# Patient Record
Sex: FEMALE | Race: WHITE | NOT HISPANIC OR LATINO | Employment: UNEMPLOYED | URBAN - METROPOLITAN AREA
[De-identification: names, ages, dates, MRNs, and addresses within clinical notes are randomized per-mention and may not be internally consistent; named-entity substitution may affect disease eponyms.]

---

## 2017-09-27 ENCOUNTER — ALLSCRIPTS OFFICE VISIT (OUTPATIENT)
Dept: OTHER | Facility: OTHER | Age: 62
End: 2017-09-27

## 2017-09-27 ENCOUNTER — GENERIC CONVERSION - ENCOUNTER (OUTPATIENT)
Dept: OTHER | Facility: OTHER | Age: 62
End: 2017-09-27

## 2017-09-27 ENCOUNTER — GENERIC CONVERSION - ENCOUNTER (OUTPATIENT)
Dept: INTERNAL MEDICINE CLINIC | Facility: CLINIC | Age: 62
End: 2017-09-27

## 2017-09-27 DIAGNOSIS — R92.2 INCONCLUSIVE MAMMOGRAM: ICD-10-CM

## 2017-09-27 DIAGNOSIS — Z12.11 ENCOUNTER FOR SCREENING FOR MALIGNANT NEOPLASM OF COLON: ICD-10-CM

## 2017-09-27 DIAGNOSIS — R53.83 OTHER FATIGUE: ICD-10-CM

## 2017-09-27 DIAGNOSIS — E55.9 VITAMIN D DEFICIENCY: ICD-10-CM

## 2017-09-27 DIAGNOSIS — Z13.6 ENCOUNTER FOR SCREENING FOR CARDIOVASCULAR DISORDERS: ICD-10-CM

## 2017-09-27 DIAGNOSIS — Z11.59 ENCOUNTER FOR SCREENING FOR OTHER VIRAL DISEASES: ICD-10-CM

## 2018-01-22 VITALS
HEART RATE: 76 BPM | TEMPERATURE: 98.6 F | WEIGHT: 148 LBS | BODY MASS INDEX: 27.23 KG/M2 | HEIGHT: 62 IN | OXYGEN SATURATION: 98 %

## 2018-01-22 VITALS — DIASTOLIC BLOOD PRESSURE: 85 MMHG | SYSTOLIC BLOOD PRESSURE: 160 MMHG

## 2018-01-22 VITALS — DIASTOLIC BLOOD PRESSURE: 70 MMHG | SYSTOLIC BLOOD PRESSURE: 110 MMHG

## 2018-06-04 ENCOUNTER — OFFICE VISIT (OUTPATIENT)
Dept: OBGYN CLINIC | Age: 63
End: 2018-06-04
Payer: COMMERCIAL

## 2018-06-04 VITALS
SYSTOLIC BLOOD PRESSURE: 148 MMHG | WEIGHT: 143 LBS | DIASTOLIC BLOOD PRESSURE: 86 MMHG | BODY MASS INDEX: 25.34 KG/M2 | HEIGHT: 63 IN

## 2018-06-04 DIAGNOSIS — Z12.31 ENCOUNTER FOR SCREENING MAMMOGRAM FOR MALIGNANT NEOPLASM OF BREAST: ICD-10-CM

## 2018-06-04 DIAGNOSIS — Z01.419 ENCOUNTER FOR GYNECOLOGICAL EXAMINATION WITHOUT ABNORMAL FINDING: Primary | ICD-10-CM

## 2018-06-04 DIAGNOSIS — R92.2 DENSE BREAST: ICD-10-CM

## 2018-06-04 DIAGNOSIS — Z12.11 SCREEN FOR COLON CANCER: ICD-10-CM

## 2018-06-04 PROBLEM — E55.9 MILD VITAMIN D DEFICIENCY: Status: ACTIVE | Noted: 2017-09-27

## 2018-06-04 PROBLEM — R92.30 DENSE BREASTS: Status: ACTIVE | Noted: 2017-09-28

## 2018-06-04 PROCEDURE — 87624 HPV HI-RISK TYP POOLED RSLT: CPT | Performed by: OBSTETRICS & GYNECOLOGY

## 2018-06-04 PROCEDURE — G0145 SCR C/V CYTO,THINLAYER,RESCR: HCPCS | Performed by: OBSTETRICS & GYNECOLOGY

## 2018-06-04 PROCEDURE — 99396 PREV VISIT EST AGE 40-64: CPT | Performed by: OBSTETRICS & GYNECOLOGY

## 2018-06-04 NOTE — PROGRESS NOTES
Assessment/Plan:    Encounter for gynecological examination without abnormal finding  Pap/HPV collected  Mammogram ordered   Colonoscopy ordered/recommended  Encourage healthy diet, exercise, Calcium 1200mg per day and at least 800 iu Vitamin D daily  Hypertension- recommend follow up with PCP  Healthy diet, low salt, increase exercise  Diagnoses and all orders for this visit:    Encounter for gynecological examination without abnormal finding    Encounter for screening mammogram for malignant neoplasm of breast  -     Mammo screening bilateral w 3d & cad; Future    Dense breast  -     Mammo screening bilateral w 3d & cad; Future    Screen for colon cancer  -     Ambulatory referral to Gastroenterology; Future    Other orders  -     Cancel: Mammo screening bilateral w cad; Future          Subjective:      Patient ID: Zakiya Vail is a 65 y o  female  Patient here for new patient yearly exam reff by primary care doctor  Age of first period 17yrs old   (1 MISCARRIAGE)  LMP: Patient is   Last pap: 10/22/14 neg hpv neg (due )  Last mammo: 10/22/14 dense breast neg   Colonoscopy:  (due)  Patient is not a smoker  Patient is a drinker  2 glasses of wine weekly  Patient doesn't exercise  No complaints  Here for yearly visit  Works in [employer] in [employer]  4 adult children  One daughter lives in [place] with three children  Other daughter just got   Gynecologic Exam   The patient's pertinent negatives include no genital itching, genital lesions, genital odor, genital rash, pelvic pain, vaginal bleeding or vaginal discharge  The patient is experiencing no pain  Pertinent negatives include no chills, constipation, diarrhea, fever, nausea, sore throat or vomiting  She is not sexually active  She is postmenopausal  Her past medical history is significant for a  section         The following portions of the patient's history were reviewed and updated as appropriate:   She  has a past medical history of Arthritis and Vocal cord nodules  She   Patient Active Problem List    Diagnosis Date Noted    Encounter for gynecological examination without abnormal finding 2018    Dense breasts 2017    Mild vitamin D deficiency 2017     She  has a past surgical history that includes  section and Laryngoscopy  Her family history includes Breast cancer in her mother; Cancer in her father; Diabetes in her daughter and son; Diabetes type I in her child, father, and mother; Lung cancer in her father and mother; Mental illness in her son  She  reports that she has never smoked  She has never used smokeless tobacco  She reports that she drinks about 1 2 oz of alcohol per week   She reports that she does not use drugs  No current outpatient prescriptions on file  No current facility-administered medications for this visit  No current outpatient prescriptions on file prior to visit  No current facility-administered medications on file prior to visit  She has No Known Allergies       Review of Systems   Constitutional: Negative for activity change, appetite change, chills, fatigue and fever  HENT: Negative for rhinorrhea, sneezing and sore throat  Eyes: Negative for visual disturbance  Respiratory: Negative for cough, shortness of breath and wheezing  Cardiovascular: Negative for chest pain, palpitations and leg swelling  Gastrointestinal: Negative for abdominal distention, constipation, diarrhea, nausea and vomiting  Genitourinary: Negative for difficulty urinating, pelvic pain and vaginal discharge  Neurological: Negative for syncope and light-headedness           Objective:      /86 (BP Location: Left arm, Patient Position: Sitting, Cuff Size: Standard)   Ht 5' 3" (1 6 m)   Wt 64 9 kg (143 lb)   LMP  (LMP Unknown)   BMI 25 33 kg/m²          Physical Exam   Constitutional: She is oriented to person, place, and time    Genitourinary: Vagina normal and uterus normal  No breast swelling, tenderness, discharge or bleeding  There is no rash, tenderness, lesion or injury on the right labia  There is no rash, tenderness, lesion or injury on the left labia  Uterus is not deviated, not enlarged, not fixed and not tender  Cervix exhibits no motion tenderness, no discharge and no friability  Right adnexum displays no mass, no tenderness and no fullness  Left adnexum displays no mass, no tenderness and no fullness  No tenderness or bleeding in the vagina  No vaginal discharge found  Neurological: She is alert and oriented to person, place, and time

## 2018-06-04 NOTE — ASSESSMENT & PLAN NOTE
Pap/HPV collected  Mammogram ordered   Colonoscopy ordered/recommended  Encourage healthy diet, exercise, Calcium 1200mg per day and at least 800 iu Vitamin D daily  Hypertension- recommend follow up with PCP  Healthy diet, low salt, increase exercise

## 2018-06-04 NOTE — PATIENT INSTRUCTIONS

## 2018-06-05 LAB — HPV RRNA GENITAL QL NAA+PROBE: NORMAL

## 2018-06-06 LAB
LAB AP GYN PRIMARY INTERPRETATION: NORMAL
Lab: NORMAL

## 2018-06-07 ENCOUNTER — TRANSCRIBE ORDERS (OUTPATIENT)
Dept: ADMINISTRATIVE | Facility: HOSPITAL | Age: 63
End: 2018-06-07

## 2018-06-07 DIAGNOSIS — Z13.820 SCREENING FOR OSTEOPOROSIS: Primary | ICD-10-CM

## 2018-06-08 ENCOUNTER — TELEPHONE (OUTPATIENT)
Dept: OBGYN CLINIC | Age: 63
End: 2018-06-08

## 2018-06-08 NOTE — TELEPHONE ENCOUNTER
----- Message from Conrad Avila MD sent at 6/6/2018  3:21 PM EDT -----  Please call patient  Pap and HPV are negative

## 2018-06-08 NOTE — TELEPHONE ENCOUNTER
Called patient to advise of normal pap results  Unable to reach patient left detailed voicemail advising patient of normal results, advised to call with any other questions or concerns  Number to the [facility] office provided as well

## 2018-07-16 ENCOUNTER — HOSPITAL ENCOUNTER (OUTPATIENT)
Dept: MAMMOGRAPHY | Facility: CLINIC | Age: 63
Discharge: HOME | End: 2018-07-16
Payer: COMMERCIAL

## 2018-07-16 ENCOUNTER — HOSPITAL ENCOUNTER (OUTPATIENT)
Dept: BONE DENSITY | Facility: CLINIC | Age: 63
Discharge: HOME | End: 2018-07-16
Payer: COMMERCIAL

## 2018-07-16 ENCOUNTER — APPOINTMENT (OUTPATIENT)
Dept: LAB | Facility: CLINIC | Age: 63
End: 2018-07-16
Payer: COMMERCIAL

## 2018-07-16 DIAGNOSIS — Z11.59 ENCOUNTER FOR SCREENING FOR OTHER VIRAL DISEASES: ICD-10-CM

## 2018-07-16 DIAGNOSIS — Z12.31 ENCOUNTER FOR SCREENING MAMMOGRAM FOR MALIGNANT NEOPLASM OF BREAST: ICD-10-CM

## 2018-07-16 DIAGNOSIS — E55.9 VITAMIN D DEFICIENCY: ICD-10-CM

## 2018-07-16 DIAGNOSIS — R53.83 OTHER FATIGUE: ICD-10-CM

## 2018-07-16 DIAGNOSIS — R92.2 DENSE BREAST: ICD-10-CM

## 2018-07-16 DIAGNOSIS — Z12.11 ENCOUNTER FOR SCREENING FOR MALIGNANT NEOPLASM OF COLON: ICD-10-CM

## 2018-07-16 DIAGNOSIS — Z13.6 ENCOUNTER FOR SCREENING FOR CARDIOVASCULAR DISORDERS: ICD-10-CM

## 2018-07-16 LAB
25(OH)D3 SERPL-MCNC: 31.6 NG/ML (ref 30–100)
ALBUMIN SERPL BCP-MCNC: 4 G/DL (ref 3.5–5)
ALP SERPL-CCNC: 62 U/L (ref 46–116)
ALT SERPL W P-5'-P-CCNC: 25 U/L (ref 12–78)
ANION GAP SERPL CALCULATED.3IONS-SCNC: 6 MMOL/L (ref 4–13)
AST SERPL W P-5'-P-CCNC: 16 U/L (ref 5–45)
BASOPHILS # BLD AUTO: 0.04 THOUSANDS/ΜL (ref 0–0.1)
BASOPHILS NFR BLD AUTO: 1 % (ref 0–1)
BILIRUB SERPL-MCNC: 0.41 MG/DL (ref 0.2–1)
BUN SERPL-MCNC: 12 MG/DL (ref 5–25)
CALCIUM SERPL-MCNC: 9 MG/DL (ref 8.3–10.1)
CHLORIDE SERPL-SCNC: 104 MMOL/L (ref 100–108)
CHOLEST SERPL-MCNC: 216 MG/DL (ref 50–200)
CO2 SERPL-SCNC: 28 MMOL/L (ref 21–32)
CREAT SERPL-MCNC: 0.72 MG/DL (ref 0.6–1.3)
EOSINOPHIL # BLD AUTO: 0.1 THOUSAND/ΜL (ref 0–0.61)
EOSINOPHIL NFR BLD AUTO: 2 % (ref 0–6)
ERYTHROCYTE [DISTWIDTH] IN BLOOD BY AUTOMATED COUNT: 13.2 % (ref 11.6–15.1)
GFR SERPL CREATININE-BSD FRML MDRD: 91 ML/MIN/1.73SQ M
GLUCOSE P FAST SERPL-MCNC: 85 MG/DL (ref 65–99)
HCT VFR BLD AUTO: 38.8 % (ref 34.8–46.1)
HDLC SERPL-MCNC: 66 MG/DL (ref 40–60)
HGB BLD-MCNC: 12.7 G/DL (ref 11.5–15.4)
IMM GRANULOCYTES # BLD AUTO: 0.02 THOUSAND/UL (ref 0–0.2)
IMM GRANULOCYTES NFR BLD AUTO: 0 % (ref 0–2)
LDLC SERPL CALC-MCNC: 122 MG/DL (ref 0–100)
LYMPHOCYTES # BLD AUTO: 2.65 THOUSANDS/ΜL (ref 0.6–4.47)
LYMPHOCYTES NFR BLD AUTO: 42 % (ref 14–44)
MCH RBC QN AUTO: 30.1 PG (ref 26.8–34.3)
MCHC RBC AUTO-ENTMCNC: 32.7 G/DL (ref 31.4–37.4)
MCV RBC AUTO: 92 FL (ref 82–98)
MONOCYTES # BLD AUTO: 0.37 THOUSAND/ΜL (ref 0.17–1.22)
MONOCYTES NFR BLD AUTO: 6 % (ref 4–12)
NEUTROPHILS # BLD AUTO: 3.11 THOUSANDS/ΜL (ref 1.85–7.62)
NEUTS SEG NFR BLD AUTO: 49 % (ref 43–75)
NRBC BLD AUTO-RTO: 0 /100 WBCS
PLATELET # BLD AUTO: 326 THOUSANDS/UL (ref 149–390)
PMV BLD AUTO: 10.4 FL (ref 8.9–12.7)
POTASSIUM SERPL-SCNC: 3.6 MMOL/L (ref 3.5–5.3)
PROT SERPL-MCNC: 7.8 G/DL (ref 6.4–8.2)
RBC # BLD AUTO: 4.22 MILLION/UL (ref 3.81–5.12)
SODIUM SERPL-SCNC: 138 MMOL/L (ref 136–145)
TRIGL SERPL-MCNC: 138 MG/DL
TSH SERPL DL<=0.05 MIU/L-ACNC: 2.67 UIU/ML (ref 0.36–3.74)
WBC # BLD AUTO: 6.29 THOUSAND/UL (ref 4.31–10.16)

## 2018-07-16 PROCEDURE — 86803 HEPATITIS C AB TEST: CPT

## 2018-07-16 PROCEDURE — 77067 SCR MAMMO BI INCL CAD: CPT

## 2018-07-16 PROCEDURE — 36415 COLL VENOUS BLD VENIPUNCTURE: CPT

## 2018-07-16 PROCEDURE — 82306 VITAMIN D 25 HYDROXY: CPT

## 2018-07-16 PROCEDURE — 80061 LIPID PANEL: CPT

## 2018-07-16 PROCEDURE — 77080 DXA BONE DENSITY AXIAL: CPT

## 2018-07-16 PROCEDURE — 84443 ASSAY THYROID STIM HORMONE: CPT

## 2018-07-16 PROCEDURE — 80053 COMPREHEN METABOLIC PANEL: CPT

## 2018-07-16 PROCEDURE — 77063 BREAST TOMOSYNTHESIS BI: CPT

## 2018-07-16 PROCEDURE — 85025 COMPLETE CBC W/AUTO DIFF WBC: CPT

## 2018-07-18 LAB — HCV AB SER QL: NORMAL

## 2018-07-19 DIAGNOSIS — M85.859 OSTEOPENIA OF NECK OF FEMUR, UNSPECIFIED LATERALITY: Primary | ICD-10-CM

## 2018-07-20 ENCOUNTER — TELEPHONE (OUTPATIENT)
Dept: INTERNAL MEDICINE CLINIC | Facility: CLINIC | Age: 63
End: 2018-07-20

## 2018-07-24 ENCOUNTER — TELEPHONE (OUTPATIENT)
Dept: OBGYN CLINIC | Facility: CLINIC | Age: 63
End: 2018-07-24

## 2018-07-24 NOTE — TELEPHONE ENCOUNTER
----- Message from Dahiana Nichols sent at 7/24/2018 10:59 AM EDT -----  Regarding: Test Results Question  Contact: [phone]  What are the results of my mammogram?

## 2018-08-03 ENCOUNTER — OFFICE VISIT (OUTPATIENT)
Dept: INTERNAL MEDICINE CLINIC | Facility: CLINIC | Age: 63
End: 2018-08-03
Payer: COMMERCIAL

## 2018-08-03 VITALS
TEMPERATURE: 98.4 F | HEIGHT: 63 IN | SYSTOLIC BLOOD PRESSURE: 126 MMHG | DIASTOLIC BLOOD PRESSURE: 80 MMHG | RESPIRATION RATE: 18 BRPM | HEART RATE: 78 BPM | BODY MASS INDEX: 25.16 KG/M2 | WEIGHT: 142 LBS | OXYGEN SATURATION: 99 %

## 2018-08-03 DIAGNOSIS — T14.8XXA ABRASION: ICD-10-CM

## 2018-08-03 DIAGNOSIS — R35.0 URINE FREQUENCY: Primary | ICD-10-CM

## 2018-08-03 DIAGNOSIS — R30.0 DYSURIA: ICD-10-CM

## 2018-08-03 LAB
SL AMB  POCT GLUCOSE, UA: 0
SL AMB LEUKOCYTE ESTERASE,UA: ABNORMAL
SL AMB POCT BILIRUBIN,UA: 0
SL AMB POCT BLOOD,UA: ABNORMAL
SL AMB POCT CLARITY,UA: ABNORMAL
SL AMB POCT COLOR,UA: ABNORMAL
SL AMB POCT KETONES,UA: 0
SL AMB POCT NITRITE,UA: 0
SL AMB POCT PH,UA: 5
SL AMB POCT SPECIFIC GRAVITY,UA: 1
SL AMB POCT URINE PROTEIN: 0
SL AMB POCT UROBILINOGEN: 0.2

## 2018-08-03 PROCEDURE — 87077 CULTURE AEROBIC IDENTIFY: CPT | Performed by: NURSE PRACTITIONER

## 2018-08-03 PROCEDURE — 90715 TDAP VACCINE 7 YRS/> IM: CPT

## 2018-08-03 PROCEDURE — 81001 URINALYSIS AUTO W/SCOPE: CPT | Performed by: NURSE PRACTITIONER

## 2018-08-03 PROCEDURE — 81002 URINALYSIS NONAUTO W/O SCOPE: CPT | Performed by: NURSE PRACTITIONER

## 2018-08-03 PROCEDURE — 87086 URINE CULTURE/COLONY COUNT: CPT | Performed by: NURSE PRACTITIONER

## 2018-08-03 PROCEDURE — 87186 SC STD MICRODIL/AGAR DIL: CPT | Performed by: NURSE PRACTITIONER

## 2018-08-03 PROCEDURE — 90471 IMMUNIZATION ADMIN: CPT

## 2018-08-03 PROCEDURE — 3008F BODY MASS INDEX DOCD: CPT | Performed by: NURSE PRACTITIONER

## 2018-08-03 PROCEDURE — 99214 OFFICE O/P EST MOD 30 MIN: CPT | Performed by: NURSE PRACTITIONER

## 2018-08-03 RX ORDER — NITROFURANTOIN 25; 75 MG/1; MG/1
100 CAPSULE ORAL 2 TIMES DAILY
Qty: 14 CAPSULE | Refills: 0 | Status: SHIPPED | OUTPATIENT
Start: 2018-08-03 | End: 2020-04-16

## 2018-08-03 NOTE — PROGRESS NOTES
Assessment/Plan:    1  UTI-will start Macrobid and send urine for culture  2  Small cut on right eyebrow area-will give Adacel today    Follow up as needed  Diagnoses and all orders for this visit:    Urine frequency  -     POCT urine dip  -     Urinalysis with microscopic  -     Urine culture; Future  -     Urine culture  -     nitrofurantoin (MACROBID) 100 mg capsule; Take 1 capsule (100 mg total) by mouth 2 (two) times a day    Dysuria  -     Urinalysis with microscopic  -     Urine culture; Future  -     Urine culture  -     nitrofurantoin (MACROBID) 100 mg capsule; Take 1 capsule (100 mg total) by mouth 2 (two) times a day    Abrasion  -     TDAP VACCINE GREATER THAN OR EQUAL TO 6YO IM        The patient was counseled regarding instructions for management, risk factor reductions, patient and family education,impressions, risks and benefits of treatment options, side effects of medications, importance of compliance with treatment  The treatment plan was reviewed with the patient/guardian and patient/guardian understands and agrees with the treatment plan  Current Outpatient Prescriptions:     nitrofurantoin (MACROBID) 100 mg capsule, Take 1 capsule (100 mg total) by mouth 2 (two) times a day, Disp: 14 capsule, Rfl: 0    Subjective:      Patient ID: Zakiya Vail is a 65 y o  female  Jean Claude Almaguer is here today with a few days of dyuria with an episode of feeling feverish  The following portions of the patient's history were reviewed and updated as appropriate:   She has a past medical history of Arthritis and Vocal cord nodules  ,   does not have any pertinent problems on file  ,   has a past surgical history that includes  section and Laryngoscopy  ,  family history includes Breast cancer in her mother; Cancer in her father; Diabetes in her daughter and son; Diabetes type I in her child, father, and mother; Lung cancer in her father and mother; Mental illness in her son , reports that she has never smoked  She has never used smokeless tobacco  She reports that she drinks about 1 2 oz of alcohol per week   She reports that she does not use drugs  ,  has No Known Allergies       Review of Systems   Constitutional: Negative  Respiratory: Negative  Cardiovascular: Negative  Genitourinary: Positive for dysuria  Musculoskeletal: Negative  Psychiatric/Behavioral: Negative  Objective:  /80   Pulse 78   Temp 98 4 °F (36 9 °C)   Resp 18   Ht 5' 3" (1 6 m)   Wt 64 4 kg (142 lb)   LMP  (LMP Unknown)   SpO2 99%   BMI 25 15 kg/m²     Lab Review  not applicable     Imaging: Dxa Bone Density Spine Hip And Pelvis    Result Date: 7/17/2018  Narrative: CENTRAL  DXA SCAN CLINICAL HISTORY:   65year old post-menopausal  female with no significant past medical history  Osteoporosis screening  TECHNIQUE: Bone densitometry was performed using a Horizon C bone densitometer  Regions of interest appear properly placed  There are no obvious fractures or other confounding variables which could limit the study  Degenerative changes of the lumbar spine and hip  This will falsely elevate the bone mineral densities in these regions  COMPARISON:  None  RESULTS: LUMBAR SPINE:  L1-L4: BMD 0 993 gm/cm2 T-score -0 5 Z-score 1 0 LEFT TOTAL HIP: BMD 0 826 gm/cm2 T-score -0 9 Z-score 0 1 LEFT FEMORAL NECK: BMD 0 601 gm/cm2 T-score -2 2 Z-score -0 9     Impression: 1  Based on the [facility] classification, the T-score of -2 2 in the left hip is consistent with low bone mineral density  2   The 10 year risk of hip fracture is 2 % with the 10 year risk of major osteoporotic fracture being 11  % as calculated by the WHO fracture risk assessment tool (FRAX)  The current NOF guidelines recommend treating patients with FRAX 10 year risk score of >3% for hip fracture and >20% for major osteoporotic fracture   3   Any secondary causes of low bone mineral density should be excluded prior to treatment, if clinically indicated  4   A daily intake of at least 1200 mg calcium and 800 - 1000 IU of Vitamin D, as well as weight bearing and muscle strengthening exercise, fall prevention and avoidance of tobacco and excessive alcohol intake as basic preventive measures are suggested  WHO CLASSIFICATION: Normal (a T-score of -1 0 or higher) Low bone mineral density (a T-score of less than -1 0 but higher than -2 5) Osteoporosis (a T-score of -2 5 or less) Severe osteoporosis (a T-score of -2 5 or less with a fragility fracture)   Workstation performed: [id number]     Mammo Screening Bilateral W 3d & Cad    Result Date: 7/23/2018  Narrative: Patient History: Patient is postmenopausal  Family history of breast cancer at age 46 in mother, unknown cancer at age [de-identified] in father  Patient has never smoked  Patient's BMI is 25 2  Reason for exam: screening, asymptomatic  Screening Mammo Screening Bilateral W DBT and CAD: July 16, 2018 - Check In #: [de-identified] 2D/3D Procedure 3D views: Bilateral MLO view(s) were taken  2D views: Bilateral CC view(s) were taken  Technologist: JULIUS Chance Prior study comparison: October 22, 2014, mammogram, performed at [facility]  October 22, 2014, ultrasound, performed at [facility]  August 16, 2013, mammogram, performed at [facility]  February 28, 2012, mammogram, performed at [facility]  December 29, 2010, mammogram, performed at [facility]  The breast tissue is heterogeneously dense, potentially limiting the sensitivity of mammography  Patient risk, included in this report, assists in determining the appropriate screening regimen (such as 3-D mammography or the inclusion of automated breast ultrasound or MRI)  3-D mammography may also remain indicated as screening  Left centrally lucent calcifications are characteristically-benign in morphology   No concerning masses, developing asymmetries, suspicious microcalcifications, architectural distortion, or abnormalities of the nipples or skin in either breast  SUMMARY: No evidence for malignancy in either breast   No significant changes when compared with prior studies  ACR BI-RADS® Assessments: BiRad:2 - Benign Recommendation: Routine screening mammogram of both breasts in 1 year  The patient is scheduled in a reminder system for screening mammography  8-10% of cancers will be missed on mammography  Management of a palpable abnormality must be based on clinical grounds  Patients will be notified of their results via letter from our facility  Accredited by [employer] of Radiology and FDA  Transcription Location: [address] [place] [address]: [id number] Risk Value(s): Tyrer-Cuzick 10 Year: 5 200%, Tyrer-Cuzick Lifetime: 12 300%, Myriad Table: 1 5%, SRIDHAR 5 Year: 2 8%, NCI Lifetime: 13 2%         Physical Exam   Abdominal: Soft  Bowel sounds are normal  She exhibits no distension  There is tenderness  There is no rebound and no guarding     Suprapubic tenderness   Skin:   Small cut above right eye

## 2018-08-04 LAB
BACTERIA UR QL AUTO: ABNORMAL /HPF
BILIRUB UR QL STRIP: NEGATIVE
CLARITY UR: ABNORMAL
COLOR UR: YELLOW
GLUCOSE UR STRIP-MCNC: NEGATIVE MG/DL
HGB UR QL STRIP.AUTO: ABNORMAL
HYALINE CASTS #/AREA URNS LPF: ABNORMAL /LPF
KETONES UR STRIP-MCNC: NEGATIVE MG/DL
LEUKOCYTE ESTERASE UR QL STRIP: ABNORMAL
NITRITE UR QL STRIP: NEGATIVE
NON-SQ EPI CELLS URNS QL MICRO: ABNORMAL /HPF
PH UR STRIP.AUTO: 6 [PH] (ref 4.5–8)
PROT UR STRIP-MCNC: ABNORMAL MG/DL
RBC #/AREA URNS AUTO: ABNORMAL /HPF
SP GR UR STRIP.AUTO: 1.01 (ref 1–1.03)
UROBILINOGEN UR QL STRIP.AUTO: 0.2 E.U./DL
WBC #/AREA URNS AUTO: ABNORMAL /HPF

## 2018-08-05 LAB — BACTERIA UR CULT: ABNORMAL

## 2018-08-06 ENCOUNTER — TELEPHONE (OUTPATIENT)
Dept: INTERNAL MEDICINE CLINIC | Facility: CLINIC | Age: 63
End: 2018-08-06

## 2018-08-06 DIAGNOSIS — Z13.820 SCREENING FOR OSTEOPOROSIS: Primary | ICD-10-CM

## 2018-08-06 NOTE — TELEPHONE ENCOUNTER
Brian Ramires from [place] [address] billing department needs an update script dated for 7/16 for a DXA bone density spine hip and pelvis with dx code Z13 [id number]    Previous dxa script cpt and dx codes were denied by [employer], [employer]       Please fax to [phone]

## 2018-12-03 DIAGNOSIS — Z12.11 ENCOUNTER FOR SCREENING COLONOSCOPY: Primary | ICD-10-CM

## 2019-06-11 ENCOUNTER — ANNUAL EXAM (OUTPATIENT)
Dept: OBGYN CLINIC | Facility: CLINIC | Age: 64
End: 2019-06-11
Payer: COMMERCIAL

## 2019-06-11 VITALS
DIASTOLIC BLOOD PRESSURE: 112 MMHG | WEIGHT: 142.25 LBS | SYSTOLIC BLOOD PRESSURE: 142 MMHG | HEIGHT: 63 IN | BODY MASS INDEX: 25.2 KG/M2

## 2019-06-11 DIAGNOSIS — Z12.11 SCREEN FOR COLON CANCER: ICD-10-CM

## 2019-06-11 DIAGNOSIS — Z01.419 ENCOUNTER FOR GYNECOLOGICAL EXAMINATION WITHOUT ABNORMAL FINDING: Primary | ICD-10-CM

## 2019-06-11 DIAGNOSIS — R92.2 DENSE BREAST: ICD-10-CM

## 2019-06-11 DIAGNOSIS — Z12.31 ENCOUNTER FOR SCREENING MAMMOGRAM FOR MALIGNANT NEOPLASM OF BREAST: ICD-10-CM

## 2019-06-11 PROCEDURE — S0612 ANNUAL GYNECOLOGICAL EXAMINA: HCPCS | Performed by: OBSTETRICS & GYNECOLOGY

## 2019-06-11 PROCEDURE — 87624 HPV HI-RISK TYP POOLED RSLT: CPT | Performed by: OBSTETRICS & GYNECOLOGY

## 2019-06-11 PROCEDURE — G0145 SCR C/V CYTO,THINLAYER,RESCR: HCPCS | Performed by: OBSTETRICS & GYNECOLOGY

## 2019-06-11 RX ORDER — FEXOFENADINE HCL 180 MG/1
TABLET ORAL
COMMUNITY
Start: 2013-08-16 | End: 2022-03-01

## 2019-06-13 LAB
HPV HR 12 DNA CVX QL NAA+PROBE: NEGATIVE
HPV16 DNA CVX QL NAA+PROBE: NEGATIVE
HPV18 DNA CVX QL NAA+PROBE: NEGATIVE

## 2019-06-17 LAB
LAB AP GYN PRIMARY INTERPRETATION: NORMAL
Lab: NORMAL

## 2019-06-21 ENCOUNTER — TELEPHONE (OUTPATIENT)
Dept: OBGYN CLINIC | Facility: CLINIC | Age: 64
End: 2019-06-21

## 2019-08-08 ENCOUNTER — HOSPITAL ENCOUNTER (OUTPATIENT)
Dept: MAMMOGRAPHY | Facility: CLINIC | Age: 64
Discharge: HOME | End: 2019-08-08
Payer: COMMERCIAL

## 2019-08-08 VITALS — BODY MASS INDEX: 25.16 KG/M2 | WEIGHT: 142 LBS | HEIGHT: 63 IN

## 2019-08-08 DIAGNOSIS — Z12.31 ENCOUNTER FOR SCREENING MAMMOGRAM FOR MALIGNANT NEOPLASM OF BREAST: ICD-10-CM

## 2019-08-08 DIAGNOSIS — R92.2 DENSE BREAST: ICD-10-CM

## 2019-08-08 PROCEDURE — 77063 BREAST TOMOSYNTHESIS BI: CPT

## 2019-08-08 PROCEDURE — 77067 SCR MAMMO BI INCL CAD: CPT

## 2019-11-29 ENCOUNTER — TELEPHONE (OUTPATIENT)
Dept: INTERNAL MEDICINE CLINIC | Facility: CLINIC | Age: 64
End: 2019-11-29

## 2020-02-06 ENCOUNTER — TELEPHONE (OUTPATIENT)
Dept: INTERNAL MEDICINE CLINIC | Facility: CLINIC | Age: 65
End: 2020-02-06

## 2020-02-06 NOTE — TELEPHONE ENCOUNTER
Patient called requesting Lab orders for  [facility] as well as  Ru Cabrera ,  10/16/1954   For 1 Yr Check  Appt    Margie Romero is asking Labs be sent to Home Address

## 2020-02-07 DIAGNOSIS — E55.9 MILD VITAMIN D DEFICIENCY: Primary | ICD-10-CM

## 2020-02-07 DIAGNOSIS — E78.49 OTHER HYPERLIPIDEMIA: ICD-10-CM

## 2020-04-11 LAB
25(OH)D3+25(OH)D2 SERPL-MCNC: 43.2 NG/ML (ref 30–100)
ALBUMIN SERPL-MCNC: 4.2 G/DL (ref 3.8–4.8)
ALBUMIN/GLOB SERPL: 1.5 {RATIO} (ref 1.2–2.2)
ALP SERPL-CCNC: 65 IU/L (ref 39–117)
ALT SERPL-CCNC: 21 IU/L (ref 0–32)
AST SERPL-CCNC: 22 IU/L (ref 0–40)
BASOPHILS # BLD AUTO: 0 X10E3/UL (ref 0–0.2)
BASOPHILS NFR BLD AUTO: 1 %
BILIRUB SERPL-MCNC: 0.3 MG/DL (ref 0–1.2)
BUN SERPL-MCNC: 14 MG/DL (ref 8–27)
BUN/CREAT SERPL: 18 (ref 12–28)
CALCIUM SERPL-MCNC: 9.5 MG/DL (ref 8.7–10.3)
CHLORIDE SERPL-SCNC: 103 MMOL/L (ref 96–106)
CHOLEST SERPL-MCNC: 263 MG/DL (ref 100–199)
CO2 SERPL-SCNC: 24 MMOL/L (ref 20–29)
CREAT SERPL-MCNC: 0.77 MG/DL (ref 0.57–1)
EOSINOPHIL # BLD AUTO: 0.2 X10E3/UL (ref 0–0.4)
EOSINOPHIL NFR BLD AUTO: 3 %
ERYTHROCYTE [DISTWIDTH] IN BLOOD BY AUTOMATED COUNT: 13.2 % (ref 11.7–15.4)
GLOBULIN SER-MCNC: 2.8 G/DL (ref 1.5–4.5)
GLUCOSE SERPL-MCNC: 84 MG/DL (ref 65–99)
HCT VFR BLD AUTO: 37.7 % (ref 34–46.6)
HDLC SERPL-MCNC: 67 MG/DL
HGB BLD-MCNC: 12.7 G/DL (ref 11.1–15.9)
IMM GRANULOCYTES # BLD: 0 X10E3/UL (ref 0–0.1)
IMM GRANULOCYTES NFR BLD: 0 %
LDLC SERPL CALC-MCNC: 167 MG/DL (ref 0–99)
LYMPHOCYTES # BLD AUTO: 2.6 X10E3/UL (ref 0.7–3.1)
LYMPHOCYTES NFR BLD AUTO: 46 %
MCH RBC QN AUTO: 30.6 PG (ref 26.6–33)
MCHC RBC AUTO-ENTMCNC: 33.7 G/DL (ref 31.5–35.7)
MCV RBC AUTO: 91 FL (ref 79–97)
MONOCYTES # BLD AUTO: 0.4 X10E3/UL (ref 0.1–0.9)
MONOCYTES NFR BLD AUTO: 8 %
NEUTROPHILS # BLD AUTO: 2.4 X10E3/UL (ref 1.4–7)
NEUTROPHILS NFR BLD AUTO: 42 %
PLATELET # BLD AUTO: 311 X10E3/UL (ref 150–450)
POTASSIUM SERPL-SCNC: 4.6 MMOL/L (ref 3.5–5.2)
PROT SERPL-MCNC: 7 G/DL (ref 6–8.5)
RBC # BLD AUTO: 4.15 X10E6/UL (ref 3.77–5.28)
SL AMB EGFR AFRICAN AMERICAN: 95 ML/MIN/1.73
SL AMB EGFR NON AFRICAN AMERICAN: 82 ML/MIN/1.73
SL AMB VLDL CHOLESTEROL CALC: 29 MG/DL (ref 5–40)
SODIUM SERPL-SCNC: 140 MMOL/L (ref 134–144)
TRIGL SERPL-MCNC: 145 MG/DL (ref 0–149)
WBC # BLD AUTO: 5.6 X10E3/UL (ref 3.4–10.8)

## 2020-04-16 ENCOUNTER — TELEMEDICINE (OUTPATIENT)
Dept: INTERNAL MEDICINE CLINIC | Facility: CLINIC | Age: 65
End: 2020-04-16
Payer: COMMERCIAL

## 2020-04-16 DIAGNOSIS — E55.9 MILD VITAMIN D DEFICIENCY: ICD-10-CM

## 2020-04-16 DIAGNOSIS — E78.49 OTHER HYPERLIPIDEMIA: ICD-10-CM

## 2020-04-16 DIAGNOSIS — Z13.6 SCREENING, ISCHEMIC HEART DISEASE: ICD-10-CM

## 2020-04-16 DIAGNOSIS — Z12.11 SCREEN FOR COLON CANCER: Primary | ICD-10-CM

## 2020-04-16 DIAGNOSIS — Z00.00 HEALTHCARE MAINTENANCE: ICD-10-CM

## 2020-04-16 DIAGNOSIS — Z80.3 FAMILY HISTORY OF BREAST CANCER: ICD-10-CM

## 2020-04-16 DIAGNOSIS — Z11.4 SCREENING FOR HIV WITHOUT PRESENCE OF RISK FACTORS: ICD-10-CM

## 2020-04-16 DIAGNOSIS — E78.00 ELEVATED LDL CHOLESTEROL LEVEL: ICD-10-CM

## 2020-04-16 DIAGNOSIS — E78.89 ELEVATED HDL: ICD-10-CM

## 2020-04-16 PROCEDURE — 99213 OFFICE O/P EST LOW 20 MIN: CPT | Performed by: INTERNAL MEDICINE

## 2020-04-16 RX ORDER — ACETAMINOPHEN 160 MG
2000 TABLET,DISINTEGRATING ORAL DAILY
Qty: 100 CAPSULE | Refills: 2
Start: 2020-04-16

## 2020-04-16 RX ORDER — LANOLIN ALCOHOL/MO/W.PET/CERES
1000 CREAM (GRAM) TOPICAL DAILY
Start: 2020-04-16 | End: 2021-08-18

## 2020-05-07 ENCOUNTER — TELEPHONE (OUTPATIENT)
Dept: INTERNAL MEDICINE CLINIC | Facility: CLINIC | Age: 65
End: 2020-05-07

## 2020-06-23 ENCOUNTER — ANNUAL EXAM (OUTPATIENT)
Dept: OBGYN CLINIC | Facility: CLINIC | Age: 65
End: 2020-06-23
Payer: COMMERCIAL

## 2020-06-23 VITALS — SYSTOLIC BLOOD PRESSURE: 136 MMHG | BODY MASS INDEX: 25.26 KG/M2 | WEIGHT: 142.6 LBS | DIASTOLIC BLOOD PRESSURE: 84 MMHG

## 2020-06-23 DIAGNOSIS — Z01.419 ENCOUNTER FOR GYNECOLOGICAL EXAMINATION WITHOUT ABNORMAL FINDING: ICD-10-CM

## 2020-06-23 DIAGNOSIS — Z12.31 ENCOUNTER FOR SCREENING MAMMOGRAM FOR MALIGNANT NEOPLASM OF BREAST: Primary | ICD-10-CM

## 2020-06-23 PROBLEM — Z80.3 FAMILY HISTORY OF BREAST CANCER: Status: RESOLVED | Noted: 2020-04-16 | Resolved: 2020-06-23

## 2020-06-23 PROBLEM — R92.30 DENSE BREASTS: Status: RESOLVED | Noted: 2017-09-28 | Resolved: 2020-06-23

## 2020-06-23 PROBLEM — T14.8XXA ABRASION: Status: RESOLVED | Noted: 2018-08-03 | Resolved: 2020-06-23

## 2020-06-23 PROBLEM — R30.0 DYSURIA: Status: RESOLVED | Noted: 2018-08-03 | Resolved: 2020-06-23

## 2020-06-23 PROBLEM — R92.2 DENSE BREASTS: Status: RESOLVED | Noted: 2017-09-28 | Resolved: 2020-06-23

## 2020-06-23 PROCEDURE — S0612 ANNUAL GYNECOLOGICAL EXAMINA: HCPCS | Performed by: OBSTETRICS & GYNECOLOGY

## 2020-08-24 ENCOUNTER — TELEPHONE (OUTPATIENT)
Dept: OBGYN CLINIC | Facility: CLINIC | Age: 65
End: 2020-08-24

## 2020-09-08 ENCOUNTER — HOSPITAL ENCOUNTER (OUTPATIENT)
Dept: MAMMOGRAPHY | Facility: CLINIC | Age: 65
Discharge: HOME | End: 2020-09-08
Payer: COMMERCIAL

## 2020-09-08 VITALS — BODY MASS INDEX: 25.16 KG/M2 | WEIGHT: 142 LBS | HEIGHT: 63 IN

## 2020-09-08 DIAGNOSIS — Z12.31 ENCOUNTER FOR SCREENING MAMMOGRAM FOR MALIGNANT NEOPLASM OF BREAST: ICD-10-CM

## 2020-09-08 PROCEDURE — 77067 SCR MAMMO BI INCL CAD: CPT

## 2020-09-08 PROCEDURE — 77063 BREAST TOMOSYNTHESIS BI: CPT

## 2021-07-13 ENCOUNTER — ANNUAL EXAM (OUTPATIENT)
Dept: OBGYN CLINIC | Facility: CLINIC | Age: 66
End: 2021-07-13
Payer: COMMERCIAL

## 2021-07-13 VITALS — WEIGHT: 131 LBS | DIASTOLIC BLOOD PRESSURE: 80 MMHG | SYSTOLIC BLOOD PRESSURE: 128 MMHG | BODY MASS INDEX: 23.21 KG/M2

## 2021-07-13 DIAGNOSIS — Z01.419 ENCOUNTER FOR GYNECOLOGICAL EXAMINATION WITHOUT ABNORMAL FINDING: ICD-10-CM

## 2021-07-13 DIAGNOSIS — Z12.31 SCREENING MAMMOGRAM, ENCOUNTER FOR: ICD-10-CM

## 2021-07-13 DIAGNOSIS — Z01.419 ENCOUNTER FOR GYNECOLOGICAL EXAMINATION: Primary | ICD-10-CM

## 2021-07-13 PROCEDURE — S0612 ANNUAL GYNECOLOGICAL EXAMINA: HCPCS | Performed by: OBSTETRICS & GYNECOLOGY

## 2021-07-13 NOTE — PROGRESS NOTES
Assessment/Plan:    Encounter for gynecological examination without abnormal finding  Pap/HPV current  Mammogram ordered, encouraged yearly  Colonoscopy -current screening  DEXA h/o osteopenia, declined repeat imaging at this time    Encourage healthy diet, exercise, Calcium 1200mg per day and at least 800 iu Vitamin D daily  Diagnoses and all orders for this visit:    Encounter for gynecological examination    Screening mammogram, encounter for  -     Mammo screening bilateral w 3d & cad; Future    Encounter for gynecological examination without abnormal finding          Subjective:      Patient ID: Severiano Coppersmith is a 60 y o  female  Patient presents for a routine annual visit  Menarche- 16 Y/O  Last Pap Smear- 19 Neg-HPV    Mammogram-20 WNL  Colonoscopy-20 WNL  Dexa-18 DUE-refuses to repeat study  [id number]  Non smoker  Social drinker  Not currently sexually active  No family history of  breast cancer  Patient would like to know how necessary it is to continue having mammograms  Also, declines Dexa scan  Was able to go to [place] this year and spend 6 weeks with her daughter's family  3 boys and  girl  May be thinking about moving out there  Currently unemployed,  is retiring this year  Gynecologic Exam  The patient's pertinent negatives include no genital itching, genital lesions, genital odor, genital rash, pelvic pain, vaginal bleeding or vaginal discharge  The patient is experiencing no pain  Associated symptoms include urgency  Pertinent negatives include no chills, constipation, diarrhea, fever, frequency, nausea, painful intercourse, sore throat or vomiting  She is postmenopausal        The following portions of the patient's history were reviewed and updated as appropriate:   She  has a past medical history of Arthritis and Vocal cord nodules    She   Patient Active Problem List    Diagnosis Date Noted    Elevated HDL 2020    Elevated LDL cholesterol level 2020    Other hyperlipidemia 2020    Osteopenia of neck of femur 2018    Encounter for gynecological examination without abnormal finding 2018    Mild vitamin D deficiency 2017     She  has a past surgical history that includes  section and Laryngoscopy  Her family history includes Breast cancer (age of onset: 39) in her mother; Cancer in her father; Diabetes in her daughter and son; Diabetes type I in her child, father, and mother; Lung cancer in her father and mother; Mental illness in her son; No Known Problems in her daughter and sister  She  reports that she has never smoked  She has never used smokeless tobacco  She reports current alcohol use of about 2 0 standard drinks of alcohol per week  She reports that she does not use drugs  Current Outpatient Medications   Medication Sig Dispense Refill    Cholecalciferol (VITAMIN D3) 50 MCG (2000 UT) capsule Take 1 capsule (2,000 Units total) by mouth daily 100 capsule 2    fexofenadine (ALLEGRA ALLERGY) 180 MG tablet Take by mouth      multivitamin-minerals (CENTRUM) tablet Take by mouth      vitamin B-12 (VITAMIN B-12) 1,000 mcg tablet Take 1 tablet (1,000 mcg total) by mouth daily       No current facility-administered medications for this visit  Current Outpatient Medications on File Prior to Visit   Medication Sig    Cholecalciferol (VITAMIN D3) 50 MCG (2000 UT) capsule Take 1 capsule (2,000 Units total) by mouth daily    fexofenadine (ALLEGRA ALLERGY) 180 MG tablet Take by mouth    multivitamin-minerals (CENTRUM) tablet Take by mouth    vitamin B-12 (VITAMIN B-12) 1,000 mcg tablet Take 1 tablet (1,000 mcg total) by mouth daily     No current facility-administered medications on file prior to visit  She has No Known Allergies       Review of Systems   Constitutional: Negative for activity change, appetite change, chills, fatigue and fever     HENT: Negative for rhinorrhea, sneezing and sore throat  Eyes: Negative for visual disturbance  Respiratory: Negative for cough, shortness of breath and wheezing  Cardiovascular: Negative for chest pain, palpitations and leg swelling  Gastrointestinal: Negative for abdominal distention, constipation, diarrhea, nausea and vomiting  Genitourinary: Positive for urgency  Negative for difficulty urinating, frequency, pelvic pain and vaginal discharge  Neurological: Negative for syncope and light-headedness  Objective:      /80   Wt 59 4 kg (131 lb)   LMP  (LMP Unknown)   BMI 23 21 kg/m²          Physical Exam  Constitutional:       General: She is not in acute distress  Appearance: She is well-developed  She is not diaphoretic  Chest:      Breasts: Breasts are symmetrical          Right: No inverted nipple, mass, nipple discharge, skin change or tenderness  Left: No inverted nipple, mass, nipple discharge, skin change or tenderness  Abdominal:      General: Bowel sounds are normal  There is no distension  Palpations: Abdomen is soft  There is no mass  Tenderness: There is no abdominal tenderness  There is no guarding or rebound  Genitourinary:     Labia:         Right: No rash, tenderness, lesion or injury  Left: No rash, tenderness, lesion or injury  Vagina: No vaginal discharge or bleeding  Cervix: No cervical motion tenderness, discharge or friability  Uterus: Not deviated, not enlarged, not fixed and not tender  Adnexa:         Right: No mass, tenderness or fullness  Left: No mass, tenderness or fullness  Comments: Urethral meatus- no lesions, non tender  Urethra non tender  Bladder non tender  Thin, atrophic vaginal mucosa  Skin:     General: Skin is warm and dry  Coloration: Skin is not pale  Findings: No erythema or rash

## 2021-07-13 NOTE — ASSESSMENT & PLAN NOTE
Pap/HPV current  Mammogram ordered, encouraged yearly  Colonoscopy -current screening  DEXA h/o osteopenia, declined repeat imaging at this time    Encourage healthy diet, exercise, Calcium 1200mg per day and at least 800 iu Vitamin D daily

## 2021-07-13 NOTE — PATIENT INSTRUCTIONS
Osteopenia   AMBULATORY CARE:   Osteopenia  is a condition that causes bone loss and low bone mineral density (BMD)  Low BMD can weaken your bones and increase your risk for fractures  Osteopenia does not cause signs or symptoms  You may not know you have it until you break a bone  Osteopenia must be managed or treated so it does not worsen and become osteoporosis  Osteoporosis is a serious condition that causes bones to become weak, brittle, and easily fractured  Treatment for osteopenia  depends on your risk for fracture  If your risk is low, you may only need to make exercise, nutrition, and other lifestyle changes to manage osteopenia  The changes can help prevent more bone mineral loss and reduce your risk for fractures  If your risk is high, you may be given medicines to help strengthen your bones, prevent bone breakdown, or increase bone formation  Manage osteopenia:   · Exercise as directed  Do weight-bearing exercises, such as brisk walking, dancing, or yoga  Weight-bearing exercises help build or maintain bone  Exercises such as swimming and bike riding are non-weight-bearing and will not build or maintain bone  Weightlifting also helps strengthen bones and build muscle  Extra muscle can help protect your bones  Your healthcare provider may recommend weightlifting 3 times per week as part of your exercise routine  · Increase calcium and vitamin D as directed  Calcium and vitamin D work together to help build bone  The body deposits calcium into the bones until about age 27  You will then need to get enough calcium and vitamin D to maintain what your bones are storing  Your healthcare provider may tell you to eat more dairy products, such as milk and cheese, for calcium  Spinach, salmon, and dried beans are also good sources of calcium  Cereal, bread, and orange juice may be fortified with vitamin D  You also get vitamin D from exposure to sunlight   Your healthcare provider may also suggest a calcium or vitamin D supplement  Do not take supplements unless directed  · Limit or do not drink alcohol as directed  Alcohol can take calcium from your bones and increase your risk for fractures  Ask your healthcare provider if it is safe for you to drink alcohol  Women should limit alcohol to 1 drink per day  Men should limit alcohol to 2 drinks per day  A drink of alcohol is 12 ounces of beer, 5 ounces of wine, or 1½ ounces of liquor  · Do not smoke  Nicotine can damage blood vessels and make it more difficult to manage your osteopenia  Smoking also affects bone density and increases your risk for bone fractures  Do not use e-cigarettes or smokeless tobacco in place of cigarettes or to help you quit  They still contain nicotine  Ask your healthcare provider for information if you currently smoke and need help quitting  Ask your healthcare provider for information if you need help quitting  · Prevent falls  Decrease your risk for falling by removing items from the floor and removing loose carpets  Turn the lights on where you will be walking  Follow up with your healthcare provider as directed:  Write down your questions so you remember to ask them during your visits  © Copyright [address] Information is for End User's use only and may not be sold, redistributed or otherwise used for commercial purposes  All illustrations and images included in CareNotes® are the copyrighted property of A D A M , Inc  or [address] [place] [address]  The above information is an  only  It is not intended as medical advice for individual conditions or treatments  Talk to your doctor, nurse or pharmacist before following any medical regimen to see if it is safe and effective for you  Wellness Visit for Adults   WHAT YOU NEED TO KNOW:   What is a wellness visit? A wellness visit is when you see your healthcare provider to get screened for health problems   Your healthcare provider will also give you advice on how to stay healthy  Write down your questions so you remember to ask them  Ask your healthcare provider how often you should have a wellness visit  What happens at a wellness visit? Your healthcare provider will ask about your health, and your family history of health problems  This includes high blood pressure, heart disease, and cancer  He or she will ask if you have symptoms that concern you, if you smoke, and about your mood  You may also be asked about your intake of medicines, supplements, food, and alcohol  Any of the following may be done:  · Your weight  will be checked  Your height may also be checked so your body mass index (BMI) can be calculated  Your BMI shows if you are at a healthy weight  · Your blood pressure  and heart rate will be checked  Your temperature may also be checked  · Blood and urine tests  may be done  Blood tests may be done to check your cholesterol levels  Abnormal cholesterol levels increase your risk for heart disease and stroke  You may also need a blood or urine test to check for diabetes if you are at increased risk  Urine tests may be done to look for signs of an infection or kidney disease  · A physical exam  includes checking your heartbeat and lungs with a stethoscope  Your healthcare provider may also check your skin to look for sun damage  · Screening tests  may be recommended  A screening test is done to check for diseases that may not cause symptoms  The screening tests you may need depend on your age, gender, family history, and lifestyle habits  For example, colorectal screening may be recommended if you are 48years old or older  What screening tests do I need if I am a woman? · A Pap smear  is used to screen for cervical cancer  Pap smears are usually done every 3 to 5 years depending on your age   You may need them more often if you have had abnormal Pap smear test results in the past  Ask your healthcare provider how often you should have a Pap smear  · A mammogram  is an x-ray of your breasts to screen for breast cancer  Experts recommend mammograms every 2 years starting at age 48 years  You may need a mammogram at age 52 years or younger if you have an increased risk for breast cancer  Talk to your healthcare provider about when you should start having mammograms and how often you need them  What vaccines might I need? · Get an influenza vaccine  every year  The influenza vaccine protects you from the flu  Several types of viruses cause the flu  The viruses change over time, so new vaccines are made each year  · Get a tetanus-diphtheria (Td) booster vaccine  every 10 years  This vaccine protects you against tetanus and diphtheria  Tetanus is a severe infection that may cause painful muscle spasms and lockjaw  Diphtheria is a severe bacterial infection that causes a thick covering in the back of your mouth and throat  · Get a human papillomavirus (HPV) vaccine  if you are female and aged 23 to 32 or male 23 to 24 and never received it  This vaccine protects you from HPV infection  HPV is the most common infection spread by sexual contact  HPV may also cause vaginal, penile, and anal cancers  · Get a pneumococcal vaccine  if you are aged 72 years or older  The pneumococcal vaccine is an injection given to protect you from pneumococcal disease  Pneumococcal disease is an infection caused by pneumococcal bacteria  The infection may cause pneumonia, meningitis, or an ear infection  · Get a shingles vaccine  if you are 60 or older, even if you have had shingles before  The shingles vaccine is an injection to protect you from the varicella-zoster virus  This is the same virus that causes chickenpox  Shingles is a painful rash that develops in people who had chickenpox or have been exposed to the virus  How can I eat healthy? My Plate is a model for planning healthy meals   It shows the types and amounts of foods that should go on your plate  Fruits and vegetables make up about half of your plate, and grains and protein make up the other half  A serving of dairy is included on the side of your plate  The amount of calories and serving sizes you need depends on your age, gender, weight, and height  Examples of healthy foods are listed below:  · Eat a variety of vegetables  such as dark green, red, and orange vegetables  You can also include canned vegetables low in sodium (salt) and frozen vegetables without added butter or sauces  · Eat a variety of fresh fruits , canned fruit in 100% juice, frozen fruit, and dried fruit  · Include whole grains  At least half of the grains you eat should be whole grains  Examples include whole-wheat bread, wheat pasta, brown rice, and whole-grain cereals such as oatmeal     · Eat a variety of protein foods such as seafood (fish and shellfish), lean meat, and poultry without skin (turkey and chicken)  Examples of lean meats include pork leg, shoulder, or tenderloin, and beef round, sirloin, tenderloin, and extra lean ground beef  Other protein foods include eggs and egg substitutes, beans, peas, soy products, nuts, and seeds  · Choose low-fat dairy products such as skim or 1% milk or low-fat yogurt, cheese, and cottage cheese  · Limit unhealthy fats  such as butter, hard margarine, and shortening  How much exercise do I need? Exercise at least 30 minutes per day on most days of the week  Some examples of exercise include walking, biking, dancing, and swimming  You can also fit in more physical activity by taking the stairs instead of the elevator or parking farther away from stores  Include muscle strengthening activities 2 days each week  Regular exercise provides many health benefits  It helps you manage your weight, and decreases your risk for type 2 diabetes, heart disease, stroke, and high blood pressure  Exercise can also help improve your mood   Ask your healthcare provider about the best exercise plan for you  What are some general health and safety guidelines I should follow? · Do not smoke  Nicotine and other chemicals in cigarettes and cigars can cause lung damage  Ask your healthcare provider for information if you currently smoke and need help to quit  E-cigarettes or smokeless tobacco still contain nicotine  Talk to your healthcare provider before you use these products  · Limit alcohol  A drink of alcohol is 12 ounces of beer, 5 ounces of wine, or 1½ ounces of liquor  · Lose weight, if needed  Being overweight increases your risk of certain health conditions  These include heart disease, high blood pressure, type 2 diabetes, and certain types of cancer  · Protect your skin  Do not sunbathe or use tanning beds  Use sunscreen with a SPF 15 or higher  Apply sunscreen at least 15 minutes before you go outside  Reapply sunscreen every 2 hours  Wear protective clothing, hats, and sunglasses when you are outside  · Drive safely  Always wear your seatbelt  Make sure everyone in your car wears a seatbelt  A seatbelt can save your life if you are in an accident  Do not use your cell phone when you are driving  This could distract you and cause an accident  Pull over if you need to make a call or send a text message  · Practice safe sex  Use latex condoms if are sexually active and have more than one partner  Your healthcare provider may recommend screening tests for sexually transmitted infections (STIs)  · Wear helmets, lifejackets, and protective gear  Always wear a helmet when you ride a bike or motorcycle, go skiing, or play sports that could cause a head injury  Wear protective equipment when you play sports  Wear a lifejacket when you are on a boat or doing water sports  CARE AGREEMENT:   You have the right to help plan your care  Learn about your health condition and how it may be treated   Discuss treatment options with your healthcare providers to decide what care you want to receive  You always have the right to refuse treatment  The above information is an  only  It is not intended as medical advice for individual conditions or treatments  Talk to your doctor, nurse or pharmacist before following any medical regimen to see if it is safe and effective for you  © Copyright [address] Information is for End User's use only and may not be sold, redistributed or otherwise used for commercial purposes   All illustrations and images included in CareNotes® are the copyrighted property of [employer] A M , Inc  or [address] [place] [address]

## 2021-08-08 ENCOUNTER — OFFICE VISIT (OUTPATIENT)
Dept: URGENT CARE | Facility: CLINIC | Age: 66
End: 2021-08-08
Payer: MEDICARE

## 2021-08-08 ENCOUNTER — NURSE TRIAGE (OUTPATIENT)
Dept: OTHER | Facility: OTHER | Age: 66
End: 2021-08-08

## 2021-08-08 VITALS
OXYGEN SATURATION: 98 % | SYSTOLIC BLOOD PRESSURE: 157 MMHG | BODY MASS INDEX: 23.03 KG/M2 | HEART RATE: 64 BPM | TEMPERATURE: 97.4 F | WEIGHT: 130 LBS | DIASTOLIC BLOOD PRESSURE: 74 MMHG

## 2021-08-08 DIAGNOSIS — R21 RASH: Primary | ICD-10-CM

## 2021-08-08 PROCEDURE — 96372 THER/PROPH/DIAG INJ SC/IM: CPT | Performed by: PHYSICIAN ASSISTANT

## 2021-08-08 PROCEDURE — 99204 OFFICE O/P NEW MOD 45 MIN: CPT | Performed by: PHYSICIAN ASSISTANT

## 2021-08-08 PROCEDURE — G0463 HOSPITAL OUTPT CLINIC VISIT: HCPCS | Performed by: PHYSICIAN ASSISTANT

## 2021-08-08 RX ORDER — PREDNISONE 10 MG/1
TABLET ORAL
Qty: 18 TABLET | Refills: 0 | Status: SHIPPED | OUTPATIENT
Start: 2021-08-08 | End: 2021-08-18

## 2021-08-08 RX ORDER — KETOROLAC TROMETHAMINE 30 MG/ML
30 INJECTION, SOLUTION INTRAMUSCULAR; INTRAVENOUS ONCE
Status: COMPLETED | OUTPATIENT
Start: 2021-08-08 | End: 2021-08-08

## 2021-08-08 RX ADMIN — KETOROLAC TROMETHAMINE 30 MG: 30 INJECTION, SOLUTION INTRAMUSCULAR; INTRAVENOUS at 09:27

## 2021-08-08 NOTE — PROGRESS NOTES
[facility]        NAME: Kathi Chinchilla is a 60 y o  female  : 1955    MRN: [id number]  DATE: 2021  TIME: 9:30 AM    Assessment and Plan   Rash [R21]  1  Rash  ketorolac (TORADOL) injection 30 mg    predniSONE 10 mg tablet     The rash looks viral in nature, with the fatigue and possible temp viral is most likely cause  The pain in the legs is consistent with nerve compression  I recommended Er if symptoms worsen and follow up with PCP for a more in depth work up      Patient Instructions   Patient Instructions   ER if you feel worse   Take the prednisone starting today           Follow up with PCP in 3-5 days  Proceed to  ER if symptoms worsen  Chief Complaint     Chief Complaint   Patient presents with    Rash     feeling fatigued 2 days ago, possible slight fever with chills, some nausea today  Noted rash 1 day ago on back and chest  BLE burning, unable to get comfortable  Denies any changes in routine  Denies SOB         History of Present Illness       The pt is a 79-year-old female presenting with a rash, fatigue, chills, nausea  She noted the rash 1 day ago first on her trunk but the other symptoms began 2 days ago  She also reports burning in her lower extremities and being unable to get comfortable  Denies CP or SOB  The first symptom was fatigue  Possible LE swelling  The burning starts under knee through top of foot  No priro episodes  Review of Systems   Review of Systems   Constitutional: Positive for chills and fatigue  Negative for activity change, appetite change and fever  Respiratory: Negative for cough, chest tightness and shortness of breath  Cardiovascular: Negative for chest pain and palpitations  Gastrointestinal: Positive for nausea  Negative for diarrhea and vomiting  Musculoskeletal: Positive for myalgias  Negative for arthralgias  Skin: Positive for rash  Negative for color change and pallor  Neurological: Negative for headaches  Current Medications       Current Outpatient Medications:     Cholecalciferol (VITAMIN D3) 50 MCG (2000 UT) capsule, Take 1 capsule (2,000 Units total) by mouth daily, Disp: 100 capsule, Rfl: 2    fexofenadine (ALLEGRA ALLERGY) 180 MG tablet, Take by mouth, Disp: , Rfl:     multivitamin-minerals (CENTRUM) tablet, Take by mouth, Disp: , Rfl:     vitamin B-12 (VITAMIN B-12) 1,000 mcg tablet, Take 1 tablet (1,000 mcg total) by mouth daily, Disp: , Rfl:     predniSONE 10 mg tablet, 3 tabs daily for 3 days, 2 tabs daily for 3 days, 1 tab daily for 3 days, Disp: 18 tablet, Rfl: 0  No current facility-administered medications for this visit  Current Allergies     Allergies as of 2021    (No Known Allergies)            The following portions of the patient's history were reviewed and updated as appropriate: allergies, current medications, past family history, past medical history, past social history, past surgical history and problem list      Past Medical History:   Diagnosis Date    Arthritis     Vocal cord nodules     removed       Past Surgical History:   Procedure Laterality Date     SECTION      LARYNGOSCOPY      with stripping of vocal cords       Family History   Problem Relation Age of Onset    Lung cancer Mother     Breast cancer Mother 39    Diabetes type I Mother     Lung cancer Father     Cancer Father     Diabetes type I Father     Diabetes type I Child     Diabetes Daughter         mellitus    Diabetes Son         mellitus    Mental illness Son         mental disorder    No Known Problems Sister     No Known Problems Daughter          Medications have been verified  Objective   /74   Pulse 64   Temp (!) 97 4 °F (36 3 °C)   Wt 59 kg (130 lb)   LMP  (LMP Unknown)   SpO2 98%   BMI 23 03 kg/m²        Physical Exam     Physical Exam  Vitals reviewed  Constitutional:       General: She is not in acute distress  Appearance: Normal appearance  She is normal weight  She is not ill-appearing, toxic-appearing or diaphoretic  HENT:      Head: Normocephalic and atraumatic  Right Ear: Tympanic membrane, ear canal and external ear normal  There is no impacted cerumen  Left Ear: Tympanic membrane, ear canal and external ear normal  There is no impacted cerumen  Nose: Nose normal  No congestion or rhinorrhea  Mouth/Throat:      Mouth: Mucous membranes are moist       Pharynx: Oropharynx is clear  No oropharyngeal exudate or posterior oropharyngeal erythema  Eyes:      General: No scleral icterus  Right eye: No discharge  Left eye: No discharge  Conjunctiva/sclera: Conjunctivae normal       Pupils: Pupils are equal, round, and reactive to light  Cardiovascular:      Rate and Rhythm: Normal rate and regular rhythm  Heart sounds: Normal heart sounds  No murmur heard  No friction rub  No gallop  Pulmonary:      Effort: Pulmonary effort is normal  No respiratory distress  Breath sounds: Normal breath sounds  No stridor  No wheezing, rhonchi or rales  Chest:      Chest wall: No tenderness  Musculoskeletal:         General: No swelling or tenderness (no tenderness along the spine or in the knees)  Normal range of motion  Skin:     General: Skin is warm and dry  Capillary Refill: Capillary refill takes less than 2 seconds  Findings: Rash present  Rash is macular and papular  Neurological:      Mental Status: She is alert

## 2021-08-08 NOTE — TELEPHONE ENCOUNTER
Reason for Disposition   [1] MODERATE pain (e g , interferes with normal activities, limping) AND [2] present > 3 days    Answer Assessment - Initial Assessment Questions  1  ONSET: "When did the pain start?"       Friday 8/6/2021    2  LOCATION: "Where is the pain located?"       Bilateral legs     3  PAIN: "How bad is the pain?"    (Scale 1-10; or mild, moderate, severe)    -  MILD (1-3): doesn't interfere with normal activities     -  MODERATE (4-7): interferes with normal activities (e g , work or school) or awakens from sleep, limping     -  SEVERE (8-10): excruciating pain, unable to do any normal activities, unable to walk      8/10    4  WORK OR EXERCISE: "Has there been any recent work or exercise that involved this part of the body?"       Denies     5  CAUSE: "What do you think is causing the leg pain?"      Unsure     6  OTHER SYMPTOMS: "Do you have any other symptoms?" (e g , chest pain, back pain, breathing difficulty, swelling, rash, fever, numbness, weakness)      Rash on her abdomen, chest and back  7   PREGNANCY: "Is there any chance you are pregnant?" "When was your last menstrual period?"      Denies    Protocols used: LEG PAIN-ADULT-

## 2021-08-09 ENCOUNTER — OFFICE VISIT (OUTPATIENT)
Dept: INTERNAL MEDICINE CLINIC | Facility: CLINIC | Age: 66
End: 2021-08-09
Payer: MEDICARE

## 2021-08-09 VITALS
BODY MASS INDEX: 23.04 KG/M2 | WEIGHT: 130 LBS | DIASTOLIC BLOOD PRESSURE: 84 MMHG | OXYGEN SATURATION: 99 % | RESPIRATION RATE: 16 BRPM | HEIGHT: 63 IN | SYSTOLIC BLOOD PRESSURE: 124 MMHG | HEART RATE: 63 BPM

## 2021-08-09 DIAGNOSIS — R68.83 CHILLS (WITHOUT FEVER): ICD-10-CM

## 2021-08-09 DIAGNOSIS — Z11.52 ENCOUNTER FOR SCREENING FOR COVID-19: ICD-10-CM

## 2021-08-09 DIAGNOSIS — R21 RASH AND NONSPECIFIC SKIN ERUPTION: Primary | ICD-10-CM

## 2021-08-09 DIAGNOSIS — E78.49 OTHER HYPERLIPIDEMIA: ICD-10-CM

## 2021-08-09 DIAGNOSIS — F41.9 ANXIETY DISORDER, UNSPECIFIED TYPE: ICD-10-CM

## 2021-08-09 PROCEDURE — 99214 OFFICE O/P EST MOD 30 MIN: CPT | Performed by: INTERNAL MEDICINE

## 2021-08-09 PROCEDURE — U0005 INFEC AGEN DETEC AMPLI PROBE: HCPCS | Performed by: INTERNAL MEDICINE

## 2021-08-09 PROCEDURE — U0003 INFECTIOUS AGENT DETECTION BY NUCLEIC ACID (DNA OR RNA); SEVERE ACUTE RESPIRATORY SYNDROME CORONAVIRUS 2 (SARS-COV-2) (CORONAVIRUS DISEASE [COVID-19]), AMPLIFIED PROBE TECHNIQUE, MAKING USE OF HIGH THROUGHPUT TECHNOLOGIES AS DESCRIBED BY CMS-2020-01-R: HCPCS | Performed by: INTERNAL MEDICINE

## 2021-08-09 NOTE — PROGRESS NOTES
Assessment/Plan:  Patient is here with complaints of  Not feeling well over the past few days  States she has not felt herself since Friday  [place] fatigued some chills and bilateral lower extremity burning  She woke up on Saturday and noticed a rash to her chest and abdomen and later on her back  She has not altered her routine  Denies any new hair products, laundry detergent, denies any new foods, denies any new medication  She was in [place] in early July and might have had COVID because other people were sick  She is not vaccinated  She had gone to an urgent care yesterday and was given some steroids and her leg pain has eased up  It appears to me as some rash  Her kids are coming from [place] and she is nervous  Will test for COVID  One of routine labs  Symptoms should disappear within a week or so  Physical exam is reassuring  Some redness to bilateral ears  Will monitor  No problem-specific Assessment & Plan notes found for this encounter  Diagnoses and all orders for this visit:    Rash and nonspecific skin eruption  -     CBC and differential; Future  -     Comprehensive metabolic panel; Future  -     TSH, 3rd generation with Free T4 reflex; Future    Chills (without fever)  -     Novel Coronavirus (COVID-19), PCR [facility] Collected in Office    Anxiety disorder, unspecified type   -     TSH, 3rd generation with Free T4 reflex; Future    Other hyperlipidemia  -     Lipid panel; Future    Encounter for screening for COVID-19  -     SARS-CoV2 Antibody, Total (IgG, IgA, IgM) [facility]; Future          Subjective:      Patient ID: Cornell Cotter is a 60 y o  female  Patient is here with complaints of a rash  The following portions of the patient's history were reviewed and updated as appropriate:   She  has a past medical history of Arthritis and Vocal cord nodules    She   Patient Active Problem List    Diagnosis Date Noted    Elevated HDL 04/16/2020    Elevated LDL cholesterol level 2020    Other hyperlipidemia 2020    Osteopenia of neck of femur 2018    Encounter for gynecological examination without abnormal finding 2018    Mild vitamin D deficiency 2017     She  has a past surgical history that includes  section and Laryngoscopy  Her family history includes Breast cancer (age of onset: 39) in her mother; Cancer in her father; Diabetes in her daughter and son; Diabetes type I in her child, father, and mother; Lung cancer in her father and mother; Mental illness in her son; No Known Problems in her daughter and sister  She  reports that she has never smoked  She has never used smokeless tobacco  She reports current alcohol use of about 2 0 standard drinks of alcohol per week  She reports that she does not use drugs  Current Outpatient Medications   Medication Sig Dispense Refill    Cholecalciferol (VITAMIN D3) 50 MCG (2000 UT) capsule Take 1 capsule (2,000 Units total) by mouth daily 100 capsule 2    multivitamin-minerals (CENTRUM) tablet Take by mouth      fexofenadine (ALLEGRA ALLERGY) 180 MG tablet Take by mouth (Patient not taking: Reported on 2021)      predniSONE 10 mg tablet 3 tabs daily for 3 days, 2 tabs daily for 3 days, 1 tab daily for 3 days 18 tablet 0    vitamin B-12 (VITAMIN B-12) 1,000 mcg tablet Take 1 tablet (1,000 mcg total) by mouth daily (Patient not taking: Reported on 2021)       No current facility-administered medications for this visit       Current Outpatient Medications on File Prior to Visit   Medication Sig    Cholecalciferol (VITAMIN D3) 50 MCG (2000 UT) capsule Take 1 capsule (2,000 Units total) by mouth daily    multivitamin-minerals (CENTRUM) tablet Take by mouth    fexofenadine (ALLEGRA ALLERGY) 180 MG tablet Take by mouth (Patient not taking: Reported on 2021)    predniSONE 10 mg tablet 3 tabs daily for 3 days, 2 tabs daily for 3 days, 1 tab daily for 3 days    vitamin B-12 (VITAMIN B-12) 1,000 mcg tablet Take 1 tablet (1,000 mcg total) by mouth daily (Patient not taking: Reported on 8/9/2021)     No current facility-administered medications on file prior to visit  She has No Known Allergies       Review of Systems      Objective:      /84 (BP Location: Right arm, Patient Position: Sitting, Cuff Size: Standard)   Pulse 63   Resp 16   Ht 5' 3" (1 6 m)   Wt 59 kg (130 lb)   LMP  (LMP Unknown)   SpO2 99%   BMI 23 03 kg/m²          Physical Exam

## 2021-08-10 LAB — SARS-COV-2 RNA RESP QL NAA+PROBE: NEGATIVE

## 2021-08-11 ENCOUNTER — HOSPITAL ENCOUNTER (INPATIENT)
Facility: HOSPITAL | Age: 66
LOS: 3 days | Discharge: HOME | DRG: 866 | End: 2021-08-14
Attending: EMERGENCY MEDICINE | Admitting: FAMILY MEDICINE
Payer: MEDICARE

## 2021-08-11 ENCOUNTER — APPOINTMENT (OUTPATIENT)
Dept: LAB | Facility: HOSPITAL | Age: 66
DRG: 866 | End: 2021-08-11
Attending: INTERNAL MEDICINE
Payer: MEDICARE

## 2021-08-11 ENCOUNTER — APPOINTMENT (EMERGENCY)
Dept: ULTRASOUND IMAGING | Facility: HOSPITAL | Age: 66
DRG: 866 | End: 2021-08-11
Payer: MEDICARE

## 2021-08-11 DIAGNOSIS — L51.9 ERYTHEMA MULTIFORME: Primary | ICD-10-CM

## 2021-08-11 DIAGNOSIS — R21 RASH AND NONSPECIFIC SKIN ERUPTION: ICD-10-CM

## 2021-08-11 DIAGNOSIS — Z11.52 ENCOUNTER FOR SCREENING FOR COVID-19: ICD-10-CM

## 2021-08-11 DIAGNOSIS — R79.89 ELEVATED LFTS: ICD-10-CM

## 2021-08-11 DIAGNOSIS — E78.49 OTHER HYPERLIPIDEMIA: ICD-10-CM

## 2021-08-11 DIAGNOSIS — R53.82 CHRONIC FATIGUE, UNSPECIFIED: ICD-10-CM

## 2021-08-11 DIAGNOSIS — R21 RASH OF BODY: ICD-10-CM

## 2021-08-11 DIAGNOSIS — F41.9 ANXIETY DISORDER, UNSPECIFIED TYPE: ICD-10-CM

## 2021-08-11 DIAGNOSIS — B08.4 HAND, FOOT AND MOUTH DISEASE: Primary | ICD-10-CM

## 2021-08-11 PROBLEM — R74.01 TRANSAMINITIS: Status: ACTIVE | Noted: 2021-08-11

## 2021-08-11 LAB
ALBUMIN SERPL BCP-MCNC: 3.4 G/DL (ref 3.5–5)
ALP SERPL-CCNC: 257 U/L (ref 46–116)
ALT SERPL W P-5'-P-CCNC: 1153 U/L (ref 12–78)
ANION GAP SERPL CALCULATED.3IONS-SCNC: 8 MMOL/L (ref 4–13)
AST SERPL W P-5'-P-CCNC: 337 U/L (ref 5–45)
BASOPHILS # BLD AUTO: 0.04 THOUSANDS/ΜL (ref 0–0.1)
BASOPHILS NFR BLD AUTO: 1 % (ref 0–1)
BILIRUB SERPL-MCNC: 1.5 MG/DL (ref 0.2–1)
BUN SERPL-MCNC: 19 MG/DL (ref 5–25)
CALCIUM ALBUM COR SERPL-MCNC: 8.8 MG/DL (ref 8.3–10.1)
CALCIUM SERPL-MCNC: 8.3 MG/DL (ref 8.3–10.1)
CHLORIDE SERPL-SCNC: 99 MMOL/L (ref 100–108)
CHOLEST SERPL-MCNC: 215 MG/DL (ref 50–200)
CO2 SERPL-SCNC: 28 MMOL/L (ref 21–32)
CREAT SERPL-MCNC: 0.76 MG/DL (ref 0.6–1.3)
CRP SERPL QL: 19.5 MG/L
EOSINOPHIL # BLD AUTO: 0.01 THOUSAND/ΜL (ref 0–0.61)
EOSINOPHIL NFR BLD AUTO: 0 % (ref 0–6)
ERYTHROCYTE [DISTWIDTH] IN BLOOD BY AUTOMATED COUNT: 14.6 % (ref 11.6–15.1)
ERYTHROCYTE [SEDIMENTATION RATE] IN BLOOD: 37 MM/HOUR (ref 0–29)
GFR SERPL CREATININE-BSD FRML MDRD: 83 ML/MIN/1.73SQ M
GLUCOSE P FAST SERPL-MCNC: 106 MG/DL (ref 65–99)
HCT VFR BLD AUTO: 37.7 % (ref 34.8–46.1)
HDLC SERPL-MCNC: 60 MG/DL
HGB BLD-MCNC: 12.5 G/DL (ref 11.5–15.4)
IMM GRANULOCYTES # BLD AUTO: 0.02 THOUSAND/UL (ref 0–0.2)
IMM GRANULOCYTES NFR BLD AUTO: 0 % (ref 0–2)
LDLC SERPL CALC-MCNC: 132 MG/DL (ref 0–100)
LYMPHOCYTES # BLD AUTO: 2.15 THOUSANDS/ΜL (ref 0.6–4.47)
LYMPHOCYTES NFR BLD AUTO: 31 % (ref 14–44)
MCH RBC QN AUTO: 30.5 PG (ref 26.8–34.3)
MCHC RBC AUTO-ENTMCNC: 33.2 G/DL (ref 31.4–37.4)
MCV RBC AUTO: 92 FL (ref 82–98)
MONOCYTES # BLD AUTO: 1.02 THOUSAND/ΜL (ref 0.17–1.22)
MONOCYTES NFR BLD AUTO: 15 % (ref 4–12)
NEUTROPHILS # BLD AUTO: 3.77 THOUSANDS/ΜL (ref 1.85–7.62)
NEUTS SEG NFR BLD AUTO: 53 % (ref 43–75)
NONHDLC SERPL-MCNC: 155 MG/DL
NRBC BLD AUTO-RTO: 0 /100 WBCS
PLATELET # BLD AUTO: 263 THOUSANDS/UL (ref 149–390)
PMV BLD AUTO: 10.7 FL (ref 8.9–12.7)
POTASSIUM SERPL-SCNC: 4.1 MMOL/L (ref 3.5–5.3)
PROT SERPL-MCNC: 7.3 G/DL (ref 6.4–8.2)
RBC # BLD AUTO: 4.1 MILLION/UL (ref 3.81–5.12)
SARS-COV-2 IGG+IGM SERPL QL IA: NORMAL
SARS-COV-2 RNA RESP QL NAA+PROBE: NEGATIVE
SODIUM SERPL-SCNC: 135 MMOL/L (ref 136–145)
TRIGL SERPL-MCNC: 114 MG/DL
TSH SERPL DL<=0.05 MIU/L-ACNC: 2.1 UIU/ML (ref 0.36–3.74)
WBC # BLD AUTO: 7.01 THOUSAND/UL (ref 4.31–10.16)

## 2021-08-11 PROCEDURE — U0003 INFECTIOUS AGENT DETECTION BY NUCLEIC ACID (DNA OR RNA); SEVERE ACUTE RESPIRATORY SYNDROME CORONAVIRUS 2 (SARS-COV-2) (CORONAVIRUS DISEASE [COVID-19]), AMPLIFIED PROBE TECHNIQUE, MAKING USE OF HIGH THROUGHPUT TECHNOLOGIES AS DESCRIBED BY CMS-2020-01-R: HCPCS | Performed by: PHYSICIAN ASSISTANT

## 2021-08-11 PROCEDURE — 99284 EMERGENCY DEPT VISIT MOD MDM: CPT | Performed by: PHYSICIAN ASSISTANT

## 2021-08-11 PROCEDURE — 86665 EPSTEIN-BARR CAPSID VCA: CPT | Performed by: PHYSICIAN ASSISTANT

## 2021-08-11 PROCEDURE — U0005 INFEC AGEN DETEC AMPLI PROBE: HCPCS | Performed by: PHYSICIAN ASSISTANT

## 2021-08-11 PROCEDURE — 96360 HYDRATION IV INFUSION INIT: CPT

## 2021-08-11 PROCEDURE — 86618 LYME DISEASE ANTIBODY: CPT | Performed by: PHYSICIAN ASSISTANT

## 2021-08-11 PROCEDURE — 85652 RBC SED RATE AUTOMATED: CPT | Performed by: PHYSICIAN ASSISTANT

## 2021-08-11 PROCEDURE — 86664 EPSTEIN-BARR NUCLEAR ANTIGEN: CPT | Performed by: PHYSICIAN ASSISTANT

## 2021-08-11 PROCEDURE — 99285 EMERGENCY DEPT VISIT HI MDM: CPT

## 2021-08-11 PROCEDURE — 86140 C-REACTIVE PROTEIN: CPT | Performed by: PHYSICIAN ASSISTANT

## 2021-08-11 PROCEDURE — 85025 COMPLETE CBC W/AUTO DIFF WBC: CPT

## 2021-08-11 PROCEDURE — 1124F ACP DISCUSS-NO DSCNMKR DOCD: CPT | Performed by: PHYSICIAN ASSISTANT

## 2021-08-11 PROCEDURE — 99222 1ST HOSP IP/OBS MODERATE 55: CPT | Performed by: PHYSICIAN ASSISTANT

## 2021-08-11 PROCEDURE — 87207 SMEAR SPECIAL STAIN: CPT | Performed by: PHYSICIAN ASSISTANT

## 2021-08-11 PROCEDURE — 87070 CULTURE OTHR SPECIMN AEROBIC: CPT | Performed by: INTERNAL MEDICINE

## 2021-08-11 PROCEDURE — 86769 SARS-COV-2 COVID-19 ANTIBODY: CPT

## 2021-08-11 PROCEDURE — 86663 EPSTEIN-BARR ANTIBODY: CPT | Performed by: PHYSICIAN ASSISTANT

## 2021-08-11 PROCEDURE — 84443 ASSAY THYROID STIM HORMONE: CPT

## 2021-08-11 PROCEDURE — 80053 COMPREHEN METABOLIC PANEL: CPT

## 2021-08-11 PROCEDURE — 36415 COLL VENOUS BLD VENIPUNCTURE: CPT

## 2021-08-11 PROCEDURE — 80061 LIPID PANEL: CPT

## 2021-08-11 RX ORDER — SODIUM CHLORIDE 9 MG/ML
75 INJECTION, SOLUTION INTRAVENOUS CONTINUOUS
Status: DISCONTINUED | OUTPATIENT
Start: 2021-08-11 | End: 2021-08-14 | Stop reason: HOSPADM

## 2021-08-11 RX ORDER — ACETAMINOPHEN 325 MG/1
650 TABLET ORAL EVERY 6 HOURS PRN
Status: DISCONTINUED | OUTPATIENT
Start: 2021-08-11 | End: 2021-08-12

## 2021-08-11 RX ORDER — POLYETHYLENE GLYCOL 3350 17 G/17G
17 POWDER, FOR SOLUTION ORAL DAILY PRN
Status: DISCONTINUED | OUTPATIENT
Start: 2021-08-11 | End: 2021-08-14 | Stop reason: HOSPADM

## 2021-08-11 RX ORDER — MAGNESIUM HYDROXIDE/ALUMINUM HYDROXICE/SIMETHICONE 120; 1200; 1200 MG/30ML; MG/30ML; MG/30ML
30 SUSPENSION ORAL EVERY 6 HOURS PRN
Status: DISCONTINUED | OUTPATIENT
Start: 2021-08-11 | End: 2021-08-14 | Stop reason: HOSPADM

## 2021-08-11 RX ORDER — METHYLPREDNISOLONE 4 MG/1
TABLET ORAL
Qty: 21 EACH | Refills: 0 | Status: SHIPPED | OUTPATIENT
Start: 2021-08-11 | End: 2021-08-18

## 2021-08-11 RX ORDER — LIDOCAINE HYDROCHLORIDE 20 MG/ML
15 SOLUTION OROPHARYNGEAL 4 TIMES DAILY PRN
Qty: 15 ML | Refills: 2 | Status: SHIPPED | OUTPATIENT
Start: 2021-08-11 | End: 2022-03-01

## 2021-08-11 RX ORDER — LIDOCAINE HYDROCHLORIDE 20 MG/ML
15 SOLUTION OROPHARYNGEAL ONCE
Status: COMPLETED | OUTPATIENT
Start: 2021-08-11 | End: 2021-08-11

## 2021-08-11 RX ORDER — LIDOCAINE 40 MG/G
CREAM TOPICAL 4 TIMES DAILY PRN
Status: DISCONTINUED | OUTPATIENT
Start: 2021-08-11 | End: 2021-08-14 | Stop reason: HOSPADM

## 2021-08-11 RX ORDER — ONDANSETRON 2 MG/ML
4 INJECTION INTRAMUSCULAR; INTRAVENOUS EVERY 6 HOURS PRN
Status: DISCONTINUED | OUTPATIENT
Start: 2021-08-11 | End: 2021-08-14 | Stop reason: HOSPADM

## 2021-08-11 RX ADMIN — SODIUM CHLORIDE 1000 ML: 0.9 INJECTION, SOLUTION INTRAVENOUS at 19:50

## 2021-08-11 RX ADMIN — SODIUM CHLORIDE 75 ML/HR: 0.9 INJECTION, SOLUTION INTRAVENOUS at 23:48

## 2021-08-11 RX ADMIN — LIDOCAINE HYDROCHLORIDE 15 ML: 20 SOLUTION ORAL; TOPICAL at 20:11

## 2021-08-11 NOTE — PROGRESS NOTES
Patient is here with c/o worsening rash  Macular rash noted to trunk and soles of feet  Ulcerations present to mouth and lips  ? Hand foot mouth disease  Labs returned concerning for viral hepatitis  Patient is not able to keep up with fluids 2/2 pain  Sent to ED for further management

## 2021-08-12 ENCOUNTER — APPOINTMENT (INPATIENT)
Dept: ULTRASOUND IMAGING | Facility: HOSPITAL | Age: 66
DRG: 866 | End: 2021-08-12
Payer: MEDICARE

## 2021-08-12 LAB
% PARASITEMIA: 0
ALBUMIN SERPL BCP-MCNC: 2.7 G/DL (ref 3.5–5)
ALP SERPL-CCNC: 218 U/L (ref 46–116)
ALT SERPL W P-5'-P-CCNC: 771 U/L (ref 12–78)
ANION GAP SERPL CALCULATED.3IONS-SCNC: 9 MMOL/L (ref 4–13)
AST SERPL W P-5'-P-CCNC: 178 U/L (ref 5–45)
B BURGDOR IGG+IGM SER-ACNC: 74
BASOPHILS # BLD AUTO: 0.05 THOUSANDS/ΜL (ref 0–0.1)
BASOPHILS NFR BLD AUTO: 1 % (ref 0–1)
BILIRUB SERPL-MCNC: 0.93 MG/DL (ref 0.2–1)
BUN SERPL-MCNC: 17 MG/DL (ref 5–25)
CALCIUM ALBUM COR SERPL-MCNC: 8.7 MG/DL (ref 8.3–10.1)
CALCIUM SERPL-MCNC: 7.7 MG/DL (ref 8.3–10.1)
CHLORIDE SERPL-SCNC: 105 MMOL/L (ref 100–108)
CO2 SERPL-SCNC: 24 MMOL/L (ref 21–32)
CREAT SERPL-MCNC: 0.57 MG/DL (ref 0.6–1.3)
EOSINOPHIL # BLD AUTO: 0.02 THOUSAND/ΜL (ref 0–0.61)
EOSINOPHIL NFR BLD AUTO: 0 % (ref 0–6)
ERYTHROCYTE [DISTWIDTH] IN BLOOD BY AUTOMATED COUNT: 14.5 % (ref 11.6–15.1)
GFR SERPL CREATININE-BSD FRML MDRD: 98 ML/MIN/1.73SQ M
GLUCOSE SERPL-MCNC: 81 MG/DL (ref 65–140)
HAV IGM SER QL: NORMAL
HBV CORE IGM SER QL: NORMAL
HBV SURFACE AG SER QL: NORMAL
HCT VFR BLD AUTO: 29.6 % (ref 34.8–46.1)
HCV AB SER QL: NORMAL
HGB BLD-MCNC: 10 G/DL (ref 11.5–15.4)
IMM GRANULOCYTES # BLD AUTO: 0.01 THOUSAND/UL (ref 0–0.2)
IMM GRANULOCYTES NFR BLD AUTO: 0 % (ref 0–2)
LYMPHOCYTES # BLD AUTO: 3.14 THOUSANDS/ΜL (ref 0.6–4.47)
LYMPHOCYTES NFR BLD AUTO: 51 % (ref 14–44)
MCH RBC QN AUTO: 31 PG (ref 26.8–34.3)
MCHC RBC AUTO-ENTMCNC: 33.8 G/DL (ref 31.4–37.4)
MCV RBC AUTO: 92 FL (ref 82–98)
MONOCYTES # BLD AUTO: 0.92 THOUSAND/ΜL (ref 0.17–1.22)
MONOCYTES NFR BLD AUTO: 15 % (ref 4–12)
NEUTROPHILS # BLD AUTO: 2.02 THOUSANDS/ΜL (ref 1.85–7.62)
NEUTS SEG NFR BLD AUTO: 33 % (ref 43–75)
NRBC BLD AUTO-RTO: 0 /100 WBCS
PARASITE BLD: NO
PATHOLOGIST INTERPRETATION: NORMAL
PLATELET # BLD AUTO: 237 THOUSANDS/UL (ref 149–390)
PLATELET # BLD AUTO: 244 THOUSANDS/UL (ref 149–390)
PMV BLD AUTO: 10.2 FL (ref 8.9–12.7)
PMV BLD AUTO: 10.3 FL (ref 8.9–12.7)
POTASSIUM SERPL-SCNC: 4 MMOL/L (ref 3.5–5.3)
PROT SERPL-MCNC: 5.8 G/DL (ref 6.4–8.2)
RBC # BLD AUTO: 3.23 MILLION/UL (ref 3.81–5.12)
SODIUM SERPL-SCNC: 138 MMOL/L (ref 136–145)
WBC # BLD AUTO: 6.16 THOUSAND/UL (ref 4.31–10.16)

## 2021-08-12 PROCEDURE — 85049 AUTOMATED PLATELET COUNT: CPT | Performed by: PHYSICIAN ASSISTANT

## 2021-08-12 PROCEDURE — 76705 ECHO EXAM OF ABDOMEN: CPT

## 2021-08-12 PROCEDURE — 80053 COMPREHEN METABOLIC PANEL: CPT | Performed by: PHYSICIAN ASSISTANT

## 2021-08-12 PROCEDURE — 36415 COLL VENOUS BLD VENIPUNCTURE: CPT | Performed by: PHYSICIAN ASSISTANT

## 2021-08-12 PROCEDURE — 86695 HERPES SIMPLEX TYPE 1 TEST: CPT | Performed by: PHYSICIAN ASSISTANT

## 2021-08-12 PROCEDURE — 80074 ACUTE HEPATITIS PANEL: CPT | Performed by: PHYSICIAN ASSISTANT

## 2021-08-12 PROCEDURE — 99221 1ST HOSP IP/OBS SF/LOW 40: CPT | Performed by: PHYSICIAN ASSISTANT

## 2021-08-12 PROCEDURE — 99233 SBSQ HOSP IP/OBS HIGH 50: CPT | Performed by: FAMILY MEDICINE

## 2021-08-12 PROCEDURE — 86696 HERPES SIMPLEX TYPE 2 TEST: CPT | Performed by: PHYSICIAN ASSISTANT

## 2021-08-12 PROCEDURE — 85025 COMPLETE CBC W/AUTO DIFF WBC: CPT | Performed by: PHYSICIAN ASSISTANT

## 2021-08-12 RX ADMIN — ALUMINA, MAGNESIA, AND SIMETHICONE ORAL SUSPENSION REGULAR STRENGTH 10 ML: 1200; 1200; 120 SUSPENSION ORAL at 21:54

## 2021-08-12 RX ADMIN — SODIUM CHLORIDE 75 ML/HR: 0.9 INJECTION, SOLUTION INTRAVENOUS at 21:54

## 2021-08-12 RX ADMIN — ALUMINA, MAGNESIA, AND SIMETHICONE ORAL SUSPENSION REGULAR STRENGTH 10 ML: 1200; 1200; 120 SUSPENSION ORAL at 09:07

## 2021-08-12 NOTE — CONSULTS
Consultation - [address] [facility]  Christinejhoana Abreu 60 y o  female MRN: [id number]  Unit/Bed#: ED 08 Encounter: [id number]        Consults    Reason for Consult / Principal Problem: Elevated LFTs    HPI: Lavelle Hancock is a pleasant 59 yo F without significant PMH, who presented for evaluation due to a persistent worsening erythematous rash since Friday concerning for erythema multiforme of viral etiology  She was also noted to have elevated LFTs on admission for which GI is consulted  She denies any GI symptoms prior to admission including abdominal pain, nausea, vomiting, diarrhea  She denies jaundice prior to admission  She has no history of liver disease  She has no risk factors for viral hepatitis  She does have woods surrounding her home but denies any known tick exposure      REVIEW OF SYSTEMS: Negative except for as stated above    Historical Information   Past Medical History:   Diagnosis Date    Arthritis     Vocal cord nodules     removed     Past Surgical History:   Procedure Laterality Date     SECTION      LARYNGOSCOPY      with stripping of vocal cords     Social History   Social History     Substance and Sexual Activity   Alcohol Use Yes    Alcohol/week: 2 0 standard drinks    Types: 2 Glasses of wine per week    Comment: weekly     Social History     Substance and Sexual Activity   Drug Use No     Social History     Tobacco Use   Smoking Status Never Smoker   Smokeless Tobacco Never Used     Family History   Problem Relation Age of Onset    Lung cancer Mother    Isabela Freeman Breast cancer Mother 39    Diabetes type I Mother     Lung cancer Father     Cancer Father     Diabetes type I Father     Diabetes type I Child     Diabetes Daughter         mellitus    Diabetes Son         mellitus    Mental illness Son         mental disorder    No Known Problems Sister     No Known Problems Daughter        Meds/Allergies     (Not in a hospital admission)    Current Facility-Administered Medications   Medication Dose Route Frequency    al mag oxide-diphenhydramine-lidocaine viscous (MAGIC MOUTHWASH) suspension 10 mL  10 mL Swish & Swallow Q4H PRN    aluminum-magnesium hydroxide-simethicone (MYLANTA) oral suspension 30 mL  30 mL Oral Q6H PRN    enoxaparin (LOVENOX) subcutaneous injection 40 mg  40 mg Subcutaneous Daily    lidocaine (LMX) 4 % cream   Topical 4x Daily PRN    ondansetron (ZOFRAN) injection 4 mg  4 mg Intravenous Q6H PRN    polyethylene glycol (MIRALAX) packet 17 g  17 g Oral Daily PRN    sodium chloride 0 9 % infusion  75 mL/hr Intravenous Continuous       No Known Allergies        Objective     Blood pressure 97/55, pulse 56, temperature 98 2 °F (36 8 °C), temperature source Oral, resp  rate 15, height 5' 3" (1 6 m), weight 59 kg (130 lb 1 1 oz), SpO2 98 %, not currently breastfeeding  Intake/Output Summary (Last 24 hours) at 8/12/2021 1100  Last data filed at 8/11/2021 2100  Gross per 24 hour   Intake 1000 ml   Output --   Net 1000 ml         PHYSICAL EXAM:      General Appearance:   Alert, cooperative, no distress, appears stated age    HEENT:   Normocephalic, atraumatic, anicteric      Neck:  Supple, symmetrical, trachea midline   Lungs:   Clear to auscultation bilaterally; no rales, rhonchi or wheezing; respirations unlabored    Heart[de-identified]   S1 and S2 normal; regular rate and rhythm; no murmur, rub, or gallop     Abdomen:   Soft, non-tender, non-distended; normal bowel sounds; no masses, no organomegaly    Rectal:   Deferred    Extremities:  No cyanosis, clubbing or edema    Pulses:  2+ and symmetric all extremities    Skin:  Erythematous rash on the chest, torso, palms of hands, soles of feet, back     Lab Results:   Results from last 7 days   Lab Units 08/12/21  0529   WBC Thousand/uL 6 16   HEMOGLOBIN g/dL 10 0*   HEMATOCRIT % 29 6*   PLATELETS Thousands/uL 237   NEUTROS PCT % 33*   LYMPHS PCT % 51*   MONOS PCT % 15*   EOS PCT % 0     Results from last 7 days   Lab Units 08/12/21  0529   POTASSIUM mmol/L 4 0   CHLORIDE mmol/L 105   CO2 mmol/L 24   BUN mg/dL 17   CREATININE mg/dL 0 57*   CALCIUM mg/dL 7 7*   ALK PHOS U/L 218*   ALT U/L 771*   AST U/L 178*               Imaging Studies: I have personally reviewed pertinent imaging studies  No results found  ASSESSMENT and PLAN:      Elevated LFTs  - Presented with a diffuse maculopapular erythematous rash since Friday with fatigue and newly elevated LFTs in a mixed pattern  - Suspect the elevated LFTs are reactive in the setting of a viral illness and will steadily improve with supportive care  - She has no abdominal symptoms to suggest a primarily hepatobiliary etiology but will wait for RUQ US for further workup  - Viral hepatitis testing pending; lyme negative, further viral tests pending including EBV, HSV  - Derm consult pending  - LFTs have improved over the past 24 hours, continue to trend      The patient will be seen and examined by Dr Montenegro Marine

## 2021-08-12 NOTE — PROGRESS NOTES
[address]  Progress Note - Azam Gist 1955, 60 y o  female MRN: [id number]  Unit/Bed#: ED 08 Encounter: [id number]  Primary Care Provider: Delfina Galvez MD   Date and time admitted to hospital: 8/11/2021  6:50 PM    * Rash of body  Assessment & Plan  · Hepatitis negative, lyme negative  · Pending HSV  · Pending serology  · Pending dermatology input  · Pending RUQ US  · Discussed with GI  · Improving LFTs      OA,  · New onset, diffuse body rash about 5 days along with generalized fatigue/malaise  Mucosal involvement noted  Denies any new medications or contacts  No travel  · Low suspicion for [place] Silversmith given lack of new medications or suspicious medications known to cause  · Dermatology has already seen the rash by picture, will consult in a m  · Suspecting erythema multiforme  · Will check HIV, acute hepatitis, EBV, RADHA/ANCA, Lyme, blood parasite smear  · ESR/CRP elevated  · With associated transaminitis, see below  Recent Labs     08/11/21 1949   ESR 37*   CRP 19 5*         Transaminitis  Assessment & Plan  · Improving  · No GI symptoms    Results from last 7 days   Lab Units 08/12/21  0529 08/11/21  1336   ALT U/L 771* 1,153*   AST U/L 178* 337*   ALK PHOS U/L 218* 257*   TOTAL BILIRUBIN mg/dL 0 93 1 50*           VTE Pharmacologic Prophylaxis: VTE Score: 3 Moderate Risk (Score 3-4) - Pharmacological DVT Prophylaxis Ordered: enoxaparin (Lovenox)  Patient Centered Rounds: I performed bedside rounds with nursing staff today  Discussions with Specialists or Other Care Team Provider: yes, GI     Education and Discussions with Family / Patient: Updated  () via phone  Time Spent for Care: 30 minutes  More than 50% of total time spent on counseling and coordination of care as described above      Current Length of Stay: 1 day(s)  Current Patient Status: Inpatient   Certification Statement: The patient will continue to require additional inpatient hospital stay due to need for further workup  Discharge Plan: Anticipate discharge in 24-48 hrs to home  Code Status: Level 1 - Full Code    Subjective:   Seen and examined at bedside  The rash on the chest is more dark now  No pruritus  Burning pain in LE b/l but no rash    Objective:     Vitals:   Temp (24hrs), Av 8 °F (36 6 °C), Min:97 4 °F (36 3 °C), Max:98 2 °F (36 8 °C)    Temp:  [97 4 °F (36 3 °C)-98 2 °F (36 8 °C)] 98 2 °F (36 8 °C)  HR:  [56-89] 56  Resp:  [15-18] 15  BP: ()/(55-68) 97/55  SpO2:  [96 %-98 %] 98 %  Body mass index is 23 04 kg/m²  Input and Output Summary (last 24 hours): Intake/Output Summary (Last 24 hours) at 2021 1229  Last data filed at 2021 2100  Gross per 24 hour   Intake 1000 ml   Output --   Net 1000 ml       Physical Exam:   Physical Exam General- Awake, alert and oriented x 3, looks comfortable  HEENT- Normocephalic, atraumatic, oral mucosa- moist  Neck- Supple, No carotid bruit, no JVD  CVS- Normal S1/ S2, Regular rate and rhythm, No murmur, No edema  Respiratory system- B/L clear breath sounds, no wheezing  Abdomen- Soft, Non distended, no tenderness, Bowel sound- present 4 quads  Genitourinary- No suprapubic tenderness, No CVA tenderness  Skin- multiple skin lesions over the body, including mucosa, palms and soles   tender over the feet,no discharge, EM like lesions at some places  Musculoskeletal- No gross deformity  Psych- No acute psychosis  CNS- CN II- XII grossly intact, No acute focal neurologic deficit noted      Additional Data:     Labs:  Results from last 7 days   Lab Units 21  0529   WBC Thousand/uL 6 16   HEMOGLOBIN g/dL 10 0*   HEMATOCRIT % 29 6*   PLATELETS Thousands/uL 237   NEUTROS PCT % 33*   LYMPHS PCT % 51*   MONOS PCT % 15*   EOS PCT % 0     Results from last 7 days   Lab Units 21  0529   SODIUM mmol/L 138   POTASSIUM mmol/L 4 0   CHLORIDE mmol/L 105   CO2 mmol/L 24   BUN mg/dL 17   CREATININE mg/dL 0 57*   ANION GAP mmol/L 9   CALCIUM mg/dL 7 7*   ALBUMIN g/dL 2 7*   TOTAL BILIRUBIN mg/dL 0 93   ALK PHOS U/L 218*   ALT U/L 771*   AST U/L 178*   GLUCOSE RANDOM mg/dL 81                       Lines/Drains:  Invasive Devices     Peripheral Intravenous Line            Peripheral IV 08/11/21 Left Antecubital <1 day                      Imaging: No pertinent imaging reviewed  Recent Cultures (last 7 days):         Last 24 Hours Medication List:   Current Facility-Administered Medications   Medication Dose Route Frequency Provider Last Rate    al mag oxide-diphenhydramine-lidocaine viscous  10 mL Swish & Swallow Q4H PRN Bobbe Julia, PA-C      aluminum-magnesium hydroxide-simethicone  30 mL Oral Q6H PRN Bobbe Julia, PA-C      enoxaparin  40 mg Subcutaneous Daily Shamekakim Dave, PA-C      lidocaine   Topical 4x Daily PRN Bobbe Julia, PA-C      ondansetron  4 mg Intravenous Q6H PRN Shameka Dave, PA-C      polyethylene glycol  17 g Oral Daily PRN Bobbe Julia, PA-C      sodium chloride  75 mL/hr Intravenous Continuous Bobbe Julia, PA-C 75 mL/hr (08/11/21 9379)        Today, Patient Was Seen By: Mamta Kapoor MD    **Please Note: This note may have been constructed using a voice recognition system  **

## 2021-08-12 NOTE — ASSESSMENT & PLAN NOTE
· Hepatitis negative, lyme negative  · Pending HSV  · Pending serology  · Pending dermatology input  · Pending RUQ US  · Discussed with GI  · Improving LFTs      OA,  · New onset, diffuse body rash about 5 days along with generalized fatigue/malaise  Mucosal involvement noted  Denies any new medications or contacts  No travel  · Low suspicion for Noel Aures given lack of new medications or suspicious medications known to cause  · Dermatology has already seen the rash by picture, will consult in a m    · Suspecting erythema multiforme  · Will check HIV, acute hepatitis, EBV, RADHA/ANCA, Lyme, blood parasite smear  · ESR/CRP elevated  · With associated transaminitis, see below  Recent Labs     08/11/21 1949   ESR 37*   CRP 19 5*

## 2021-08-12 NOTE — ASSESSMENT & PLAN NOTE
· Improving  · No GI symptoms    Results from last 7 days   Lab Units 08/12/21  0529 08/11/21  1336   ALT U/L 771* 1,153*   AST U/L 178* 337*   ALK PHOS U/L 218* 257*   TOTAL BILIRUBIN mg/dL 0 93 1 50*

## 2021-08-12 NOTE — CASE MANAGEMENT
LOS 1     spoke to patient in her room and she states she lives with her  in a two story house with 6 steps with railing to get inside and then a full flight on the inside  She states she uses these steps without difficulty  There is a bathroom on both floors and her bedroom is on the second floor  She is independent with ADL's and ambulation  She has no DME  There is no history of mental illness nor substance abuse  She does not smoke and drinks alcohol socially  She purchases her medications at [employer] and is able to afford all of her medications  Dr Rohan Brown is her PCP  Her  is her POA and she does have a living will  She does not work at this time and she does drive  Her  will transport her home at [place]  CM reviewed discharge planning process including the following: identifying help at home, patient preference for discharge planning needs, pharmacy preference, and availability of treatment team to discuss questions or concerns patient and/or family may have regarding understanding medications and recognizing signs and symptoms once discharged  CM also encouraged patient to follow up with all recommended appointments after discharge  Patient advised of importance for patient and family to participate in managing patients medical well being  Pt is IPTA and will return home at [place]   to transport home

## 2021-08-12 NOTE — ED NOTES
Pt made aware per ultrasound to not eat lunch and will come get pt for ultrasound around 1400  Pt verbalizes understanding       Ursula Deras, JOSE ENRIQUE  08/12/21 1011

## 2021-08-12 NOTE — ASSESSMENT & PLAN NOTE
· Present on admission, etiology yet unclear  · Avoid hepatotoxins, trend with daily labs  · GI consult in a m    · Right upper quadrant ultrasound pending  Results from last 7 days   Lab Units 08/11/21  1336   ALT U/L 1,153*   AST U/L 337*   ALK PHOS U/L 257*   TOTAL BILIRUBIN mg/dL 1 50*

## 2021-08-12 NOTE — ASSESSMENT & PLAN NOTE
· New onset, diffuse body rash about 5 days along with generalized fatigue/malaise  Mucosal involvement noted  Denies any new medications or contacts  No travel  · Low suspicion for Major Royal given lack of new medications or suspicious medications known to cause  · Dermatology has already seen the rash by picture, will consult in a m    · Suspecting erythema multiforme  · Will check HIV, acute hepatitis, EBV, RADHA/ANCA, Lyme, blood parasite smear  · ESR/CRP elevated  · With associated transaminitis, see below  Recent Labs     08/11/21 1949   ESR 37*   CRP 19 5*

## 2021-08-12 NOTE — H&P
[address] H&P- Cassius Islas 1955, 60 y o  female MRN: [id number]  Unit/Bed#: ED 08 Encounter: [id number]  Primary Care Provider: Chris Langston MD   Date and time admitted to hospital: 8/11/2021  6:50 PM    * Rash of body  Assessment & Plan  · New onset, diffuse body rash about 5 days along with generalized fatigue/malaise  Mucosal involvement noted  Denies any new medications or contacts  No travel  · Low suspicion for Mane Frias given lack of new medications or suspicious medications known to cause  · Dermatology has already seen the rash by picture, will consult in a m  · Suspecting erythema multiforme  · Will check HIV, acute hepatitis, EBV, RADHA/ANCA, Lyme, blood parasite smear  · ESR/CRP elevated  · With associated transaminitis, see below  Recent Labs     08/11/21 1949   ESR 37*   CRP 19 5*         Transaminitis  Assessment & Plan  · Present on admission, etiology yet unclear  · Avoid hepatotoxins, trend with daily labs  · GI consult in a m  · Right upper quadrant ultrasound pending  Results from last 7 days   Lab Units 08/11/21  1336   ALT U/L 1,153*   AST U/L 337*   ALK PHOS U/L 257*   TOTAL BILIRUBIN mg/dL 1 50*         VTE Pharmacologic Prophylaxis: VTE Score: 3 Moderate Risk (Score 3-4) - Pharmacological DVT Prophylaxis Ordered: enoxaparin (Lovenox)  Code Status: No Order FULL  Discussion with family: Patient declined call to   Anticipated Length of Stay: Patient will be admitted on an inpatient basis with an anticipated length of stay of greater than 2 midnights secondary to transaminitis, diffuse rash  Total Time for Visit, including Counseling / Coordination of Care: 30 minutes Greater than 50% of this total time spent on direct patient counseling and coordination of care      Chief Complaint: rash, abnormal labs     History of Present Illness:  Cassius Islas is a 60 y o  female with a PMH of seasonal allergies, vitamin-D deficiency who presents with new onset rash in fatigue  Patient reports timeline of onset about 5 days ago with fatigue and feeling flu-like on day 1  On day 2 she noticed a rash to the torso, chest/back  On day 3 her legs were burning and patient was seen in urgent care  Advised Likely viral exanthem and started on steroids  On day 4 burning to the feet with rash spreading to the feet and palms, seen by PCP same-day who agreed viral and ordered lab work  Day 5 with oral mucosal involvement and abnormal labs showing significant transaminitis  Patient advised to present to the ER for further evaluation  Patient denies any recent travel, last out of state travel was to [place] in May  No known sick contacts recently  No known tick bites  No high risk sexual behavior  No recent piercings or tattoos  Denies any new medications including sulfa drugs, allopurinol, NSAIDs, AEDs, penicillins prior to onset  Only medication which is new is ibuprofen, started after onset  Review of Systems:  Review of Systems   Constitutional: Positive for activity change, chills and fatigue  Negative for fever  HENT: Positive for mouth sores, sore throat and trouble swallowing  Negative for congestion, postnasal drip, sneezing and voice change  Eyes: Negative for pain  Respiratory: Negative for cough, chest tightness and shortness of breath  Cardiovascular: Negative for chest pain  Gastrointestinal: Negative for abdominal pain, constipation, diarrhea and nausea  Genitourinary: Negative for difficulty urinating  Musculoskeletal: Positive for myalgias  Negative for joint swelling  Skin: Positive for rash  Allergic/Immunologic: Negative for immunocompromised state  Neurological: Negative for headaches  Hematological: Does not bruise/bleed easily  All other systems reviewed and are negative        Past Medical and Surgical History:   Past Medical History:   Diagnosis Date    Arthritis     Vocal cord nodules     removed       Past Surgical History:   Procedure Laterality Date     SECTION      LARYNGOSCOPY      with stripping of vocal cords       Meds/Allergies:  Prior to Admission medications    Medication Sig Start Date End Date Taking? Authorizing Provider   Cholecalciferol (VITAMIN D3) 50 MCG ( UT) capsule Take 1 capsule (2,000 Units total) by mouth daily 20   Clifford Brown DO   fexofenadine TY [facility] ALLERGY) 180 MG tablet Take by mouth  Patient not taking: Reported on 2021   Historical Provider, MD   Lidocaine Viscous HCl (XYLOCAINE) 2 % mucosal solution Swish and swallow 15 mL 4 (four) times a day as needed for mouth pain or discomfort 21   Sergio Ramos MD   methylPREDNISolone 4 MG tablet therapy pack Use as directed on package 21   Sergio Ramos MD   multivitamin-minerals (CENTRUM) tablet Take by mouth 13   Historical Provider, MD   predniSONE 10 mg tablet 3 tabs daily for 3 days, 2 tabs daily for 3 days, 1 tab daily for 3 days 21   Camryn Silva PA-C   vitamin B-12 (VITAMIN B-12) 1,000 mcg tablet Take 1 tablet (1,000 mcg total) by mouth daily  Patient not taking: Reported on 2021   Clifford Brown DO     I have reviewed home medications with patient personally      Allergies: No Known Allergies    Social History:  Marital Status: /Civil Union   Occupation:  Unemployed  Patient Pre-hospital Living Situation: Home, With spouse  Patient Pre-hospital Level of Mobility: walks  Patient Pre-hospital Diet Restrictions:  None  Substance Use History:   Social History     Substance and Sexual Activity   Alcohol Use Yes    Alcohol/week: 2 0 standard drinks    Types: 2 Glasses of wine per week    Comment: weekly     Social History     Tobacco Use   Smoking Status Never Smoker   Smokeless Tobacco Never Used     Social History     Substance and Sexual Activity   Drug Use No       Family History:  Family History   Problem Relation Age of Onset    Lung cancer Mother     Breast cancer Mother 39    Diabetes type I Mother     Lung cancer Father     Cancer Father     Diabetes type I Father     Diabetes type I Child     Diabetes Daughter         mellitus    Diabetes Son         mellitus    Mental illness Son         mental disorder    No Known Problems Sister     No Known Problems Daughter        Physical Exam:     Vitals:   Blood Pressure: 105/68 (08/11/21 1559)  Pulse: 89 (08/11/21 1559)  Temperature: (!) 97 4 °F (36 3 °C) (08/11/21 1559)  Temp Source: Temporal (08/11/21 1559)  Respirations: 18 (08/11/21 1559)  SpO2: 96 % (08/11/21 1559)    Physical Exam  Vitals and nursing note reviewed  Constitutional:       General: She is not in acute distress  Appearance: She is normal weight  She is not toxic-appearing or diaphoretic  HENT:      Head: Normocephalic and atraumatic  Nose: Nose normal       Mouth/Throat:      Lips: Lesions present  Mouth: Mucous membranes are moist  Oral lesions present  Tongue: No lesions  Tongue does not deviate from midline  Pharynx: Uvula midline  Eyes:      General: No scleral icterus  Conjunctiva/sclera: Conjunctivae normal       Pupils: Pupils are equal, round, and reactive to light  Cardiovascular:      Rate and Rhythm: Normal rate and regular rhythm  Pulses: Normal pulses  Heart sounds: No murmur heard  Pulmonary:      Effort: Pulmonary effort is normal  No respiratory distress  Breath sounds: Normal breath sounds  No rales  Abdominal:      General: Bowel sounds are normal  There is no distension  Palpations: Abdomen is soft  Musculoskeletal:      Right lower leg: No edema  Left lower leg: No edema  Skin:     General: Skin is warm and dry  Findings: Rash present  Rash is crusting (lips) and macular (diffuse upper body, palms, soles)  Neurological:      Mental Status: She is alert and oriented to person, place, and time  Mental status is at baseline  Cranial Nerves: No cranial nerve deficit  Psychiatric:         Mood and Affect: Mood normal          Behavior: Behavior normal            Additional Data:     Lab Results:  Results from last 7 days   Lab Units 08/11/21  1336   WBC Thousand/uL 7 01   HEMOGLOBIN g/dL 12 5   HEMATOCRIT % 37 7   PLATELETS Thousands/uL 263   NEUTROS PCT % 53   LYMPHS PCT % 31   MONOS PCT % 15*   EOS PCT % 0     Results from last 7 days   Lab Units 08/11/21  1336   SODIUM mmol/L 135*   POTASSIUM mmol/L 4 1   CHLORIDE mmol/L 99*   CO2 mmol/L 28   BUN mg/dL 19   CREATININE mg/dL 0 76   ANION GAP mmol/L 8   CALCIUM mg/dL 8 3   ALBUMIN g/dL 3 4*   TOTAL BILIRUBIN mg/dL 1 50*   ALK PHOS U/L 257*   ALT U/L 1,153*   AST U/L 337*                       Imaging: No pertinent imaging reviewed  US right upper quadrant    (Results Pending)       EKG and Other Studies Reviewed on Admission:   · EKG: No EKG obtained  ** Please Note: This note has been constructed using a voice recognition system   **

## 2021-08-12 NOTE — ED PROVIDER NOTES
History  Chief Complaint   Patient presents with    Abnormal Lab     Pt states she recently had blood work where is showed eleveated liver enzymes  61 yo with rash and fatigue for 5 days  Gradual onset  Began on chest and abdomen then spread to extremities including palms and soles  The rash on palms and soles is painful  States she can barely walk because her feet hurt after standing for a few minutes  She now has oral mucosal lesions as well which are painful and has caused her to have decreased PO intake  She had labs performed today and had elevated LFTs  Denies abd pain  No n/v  No chest pain or sob  No fever  No new medicines aside from being put on steroids for this rash (had rash prior to initiation of steroids)  No sick contacts  History provided by:  Patient   used: No    Rash  Location:  Full body  Quality: blistering, draining, painful and redness    Quality: not bruising, not burning, not dry, not itchy, not peeling, not scaling, not swelling and not weeping    Pain details:     Quality:  Burning    Onset quality:  Gradual    Duration:  5 days    Timing:  Constant    Progression:  Worsening  Severity:  Moderate  Onset quality:  Sudden  Duration:  5 days  Timing:  Constant  Progression:  Worsening  Chronicity:  New  Context: not animal contact, not chemical exposure, not diapers, not eggs, not exposure to similar rash, not food, not hot tub use, not insect bite/sting, not medications, not new detergent/soap, not nuts, not plant contact, not pollen, not pregnancy, not sick contacts and not sun exposure    Relieved by:  Nothing  Worsened by:  Nothing  Ineffective treatments:  None tried  Associated symptoms: fatigue and sore throat    Associated symptoms: no abdominal pain, no fever, no joint pain, no shortness of breath and not vomiting        Prior to Admission Medications   Prescriptions Last Dose Informant Patient Reported? Taking?    Cholecalciferol (VITAMIN D3) 50 MCG ( UT) capsule  Self No No   Sig: Take 1 capsule (2,000 Units total) by mouth daily   Lidocaine Viscous HCl (XYLOCAINE) 2 % mucosal solution   No No   Sig: Swish and swallow 15 mL 4 (four) times a day as needed for mouth pain or discomfort   fexofenadine (ALLEGRA ALLERGY) 180 MG tablet  Self Yes No   Sig: Take by mouth   Patient not taking: Reported on 2021   methylPREDNISolone 4 MG tablet therapy pack   No No   Sig: Use as directed on package   multivitamin-minerals (CENTRUM) tablet  Self Yes No   Sig: Take by mouth   predniSONE 10 mg tablet   No No   Sig: 3 tabs daily for 3 days, 2 tabs daily for 3 days, 1 tab daily for 3 days   vitamin B-12 (VITAMIN B-12) 1,000 mcg tablet  Self No No   Sig: Take 1 tablet (1,000 mcg total) by mouth daily   Patient not taking: Reported on 2021      Facility-Administered Medications: None       Past Medical History:   Diagnosis Date    Arthritis     Vocal cord nodules     removed       Past Surgical History:   Procedure Laterality Date     SECTION      LARYNGOSCOPY      with stripping of vocal cords       Family History   Problem Relation Age of Onset    Lung cancer Mother     Breast cancer Mother 39    Diabetes type I Mother     Lung cancer Father     Cancer Father     Diabetes type I Father     Diabetes type I Child     Diabetes Daughter         mellitus    Diabetes Son         mellitus    Mental illness Son         mental disorder    No Known Problems Sister     No Known Problems Daughter      I have reviewed and agree with the history as documented  E-Cigarette/Vaping     E-Cigarette/Vaping Substances    Nicotine No     THC No     CBD No     Flavoring No     Other No     Unknown No      Social History     Tobacco Use    Smoking status: Never Smoker    Smokeless tobacco: Never Used   Vaping Use    Vaping Use: Never assessed   Substance Use Topics    Alcohol use:  Yes     Alcohol/week: 2 0 standard drinks     Types: 2 Glasses of wine per week     Comment: weekly    Drug use: No       Review of Systems   Constitutional: Positive for fatigue  Negative for chills and fever  HENT: Positive for sore throat  Negative for ear pain  Eyes: Negative for pain and visual disturbance  Respiratory: Negative for cough and shortness of breath  Cardiovascular: Negative for chest pain and palpitations  Gastrointestinal: Negative for abdominal pain and vomiting  Genitourinary: Negative for dysuria and hematuria  Musculoskeletal: Negative for arthralgias and back pain  Skin: Positive for rash  Negative for color change  Neurological: Negative for seizures and syncope  All other systems reviewed and are negative  Physical Exam  Physical Exam  Vitals and nursing note reviewed  Constitutional:       General: She is not in acute distress  Appearance: Normal appearance  She is well-developed  She is not ill-appearing, toxic-appearing or diaphoretic  HENT:      Head: Normocephalic and atraumatic  Comments: Ulcers in posterior oropharynx  Eyes:      General: Lids are normal       Pupils: Pupils are equal, round, and reactive to light  Cardiovascular:      Rate and Rhythm: Normal rate and regular rhythm  Pulses: Normal pulses  No decreased pulses  Radial pulses are 2+ on the right side and 2+ on the left side  Heart sounds: Normal heart sounds, S1 normal and S2 normal  Heart sounds not distant  No murmur heard  No friction rub  No gallop  Pulmonary:      Effort: Pulmonary effort is normal  No tachypnea, bradypnea, accessory muscle usage or respiratory distress  Breath sounds: Normal breath sounds  No decreased breath sounds, wheezing, rhonchi or rales  Abdominal:      General: There is no distension  Palpations: Abdomen is soft  Abdomen is not rigid  Tenderness: There is no abdominal tenderness  There is no guarding or rebound     Musculoskeletal:         General: No tenderness or deformity  Normal range of motion  Cervical back: Normal range of motion and neck supple  Skin:     General: Skin is warm and dry  Coloration: Skin is not pale  Findings: Rash present  No erythema  Comments: Maculopapular rash along torso and extremities  Papular rash along palms and soles  No tenderness  No peeling  No bullae  No vesicles  Neurological:      Mental Status: She is alert and oriented to person, place, and time  GCS: GCS eye subscore is 4  GCS verbal subscore is 5  GCS motor subscore is 6  Cranial Nerves: No cranial nerve deficit  Psychiatric:         Speech: Speech normal          Vital Signs  ED Triage Vitals [08/11/21 1559]   Temperature Pulse Respirations Blood Pressure SpO2   (!) 97 4 °F (36 3 °C) 89 18 105/68 96 %      Temp Source Heart Rate Source Patient Position - Orthostatic VS BP Location FiO2 (%)   Temporal Monitor Sitting Left arm --      Pain Score       --           Vitals:    08/11/21 1559   BP: 105/68   Pulse: 89   Patient Position - Orthostatic VS: Sitting         Visual Acuity      ED Medications  Medications   sodium chloride 0 9 % bolus 1,000 mL (1,000 mL Intravenous New Bag 8/11/21 1950)   Lidocaine Viscous HCl (XYLOCAINE) 2 % mucosal solution 15 mL (15 mL Swish & Swallow Given 8/11/21 2011)       Diagnostic Studies  Results Reviewed     Procedure Component Value Units Date/Time    Rapid HIV 1/2 AB-AG Combo [[id number]]     Lab Status: No result Specimen: Blood     C-reactive protein [[id number]]  (Abnormal) Collected: 08/11/21 1949    Lab Status: Final result Specimen: Blood from Arm, Left Updated: 08/11/21 2042     CRP 19 5 mg/L     Blood Parasite smear [[id number]] Collected: 08/11/21 2010    Lab Status: In process Specimen: Blood from Arm, Left Updated: 08/11/21 2015    Lyme Antibody Profile with reflex to [facility] [[id number]] Collected: 08/11/21 2010    Lab Status:  In process Specimen: Blood from Arm, Left Updated: 08/11/21 2015    Sedimentation rate, automated [[id number]]  (Abnormal) Collected: 08/11/21 1949    Lab Status: Final result Specimen: Blood from Arm, Left Updated: 08/11/21 1958     Sed Rate 37 mm/hour     EBV acute panel [[id number]] Collected: 08/11/21 1949    Lab Status: In process Specimen: Blood from Arm, Left Updated: 08/11/21 1954    Novel Coronavirus (Covid-19),PCR [facility] - 2 Hour Stat [[id number]]     Lab Status: No result Specimen: Nares from Nose                  US right upper quadrant    (Results Pending)              Procedures  Procedures         ED Course                                           MDM  Number of Diagnoses or Management Options  Elevated LFTs: new and requires workup  Erythema multiforme: new and requires workup  Diagnosis management comments: DDX including but not limited to: allergic reaction, urticaria, cellulitis, contact dermatitis, allergic dermatitis, poison ivy/oak/sumac, vasculitis, herpes zoster, infectious etiology, bullous pemphigus, scabies; doubt staph scalded skin syndrome or Villeda Pae syndrome  Plan: Labs  US Liver  Amount and/or Complexity of Data Reviewed  Clinical lab tests: reviewed and ordered  Tests in the radiology section of CPT®: ordered    Risk of Complications, Morbidity, and/or Mortality  Presenting problems: moderate  Management options: low  General comments: 61 yo with rash and elevated LFTs  Possible erythema multiforme 2/2 viral hepatitis  Clinically appears dehydrated and uncomfortable  Will admit for further treatment and management  RUQ US pending  Considered but doubt SJS  Discussed case with derm who is in agreement       Patient Progress  Patient progress: stable      Disposition  Final diagnoses:   Erythema multiforme   Elevated LFTs     Time reflects when diagnosis was documented in both MDM as applicable and the Disposition within this note     Time User Action Codes Description Comment    8/11/2021  9:14 PM Danna SANON Add [L51 9] Erythema multiforme 8/11/2021  9:14 PM Veto Liner Add [R79 89] Elevated LFTs       ED Disposition     ED Disposition Condition Date/Time Comment    Admit Stable Wed Aug 11, 2021  9:14 PM Case was discussed with Gabriella Dorado and the patient's admission status was agreed to be Admission Status: inpatient status to the service of Dr Cristela Escobar   Follow-up Information    None         Patient's Medications   Discharge Prescriptions    No medications on file     No discharge procedures on file      PDMP Review     None          ED Provider  Electronically Signed by           Christen Duong PA-C  08/11/21 1210

## 2021-08-13 DIAGNOSIS — R79.89 ELEVATED LFTS: Primary | ICD-10-CM

## 2021-08-13 LAB
ALBUMIN SERPL BCP-MCNC: 2.5 G/DL (ref 3.5–5)
ALP SERPL-CCNC: 260 U/L (ref 46–116)
ALT SERPL W P-5'-P-CCNC: 621 U/L (ref 12–78)
ANION GAP SERPL CALCULATED.3IONS-SCNC: 9 MMOL/L (ref 4–13)
AST SERPL W P-5'-P-CCNC: 134 U/L (ref 5–45)
BASOPHILS # BLD MANUAL: 0 THOUSAND/UL (ref 0–0.1)
BASOPHILS NFR MAR MANUAL: 0 % (ref 0–1)
BILIRUB SERPL-MCNC: 0.81 MG/DL (ref 0.2–1)
BUN SERPL-MCNC: 12 MG/DL (ref 5–25)
CALCIUM ALBUM COR SERPL-MCNC: 8.7 MG/DL (ref 8.3–10.1)
CALCIUM SERPL-MCNC: 7.5 MG/DL (ref 8.3–10.1)
CHLORIDE SERPL-SCNC: 108 MMOL/L (ref 100–108)
CO2 SERPL-SCNC: 24 MMOL/L (ref 21–32)
CREAT SERPL-MCNC: 0.61 MG/DL (ref 0.6–1.3)
EBV NA IGG SER IA-ACNC: 24.2 U/ML (ref 0–17.9)
EBV VCA IGG SER IA-ACNC: >600 U/ML (ref 0–17.9)
EBV VCA IGM SER IA-ACNC: <36 U/ML (ref 0–35.9)
EOSINOPHIL # BLD MANUAL: 0.17 THOUSAND/UL (ref 0–0.4)
EOSINOPHIL NFR BLD MANUAL: 3 % (ref 0–6)
ERYTHROCYTE [DISTWIDTH] IN BLOOD BY AUTOMATED COUNT: 14.6 % (ref 11.6–15.1)
FERRITIN SERPL-MCNC: 743 NG/ML (ref 8–388)
GFR SERPL CREATININE-BSD FRML MDRD: 96 ML/MIN/1.73SQ M
GLUCOSE SERPL-MCNC: 82 MG/DL (ref 65–140)
HCT VFR BLD AUTO: 27.6 % (ref 34.8–46.1)
HGB BLD-MCNC: 9.1 G/DL (ref 11.5–15.4)
HSV1 IGG SER IA-ACNC: 21.9 INDEX (ref 0–0.9)
HSV2 IGG SER IA-ACNC: <0.91 INDEX (ref 0–0.9)
INR PPP: 1.08 (ref 0.84–1.19)
INTERPRETATION: ABNORMAL
IRON SATN MFR SERPL: 61 %
IRON SERPL-MCNC: 116 UG/DL (ref 50–170)
LYMPHOCYTES # BLD AUTO: 2.96 THOUSAND/UL (ref 0.6–4.47)
LYMPHOCYTES # BLD AUTO: 53 % (ref 14–44)
MCH RBC QN AUTO: 30.5 PG (ref 26.8–34.3)
MCHC RBC AUTO-ENTMCNC: 33 G/DL (ref 31.4–37.4)
MCV RBC AUTO: 93 FL (ref 82–98)
MONOCYTES # BLD AUTO: 0.67 THOUSAND/UL (ref 0–1.22)
MONOCYTES NFR BLD: 12 % (ref 4–12)
NEUTROPHILS # BLD MANUAL: 1.79 THOUSAND/UL (ref 1.85–7.62)
NEUTS SEG NFR BLD AUTO: 32 % (ref 43–75)
NRBC BLD AUTO-RTO: 0 /100 WBCS
PLATELET # BLD AUTO: 305 THOUSANDS/UL (ref 149–390)
PLATELET BLD QL SMEAR: ADEQUATE
PMV BLD AUTO: 10.3 FL (ref 8.9–12.7)
POTASSIUM SERPL-SCNC: 3.9 MMOL/L (ref 3.5–5.3)
PROT SERPL-MCNC: 5.5 G/DL (ref 6.4–8.2)
PROTHROMBIN TIME: 13.5 SECONDS (ref 11.6–14.5)
RBC # BLD AUTO: 2.98 MILLION/UL (ref 3.81–5.12)
RPR SER QL: NORMAL
SODIUM SERPL-SCNC: 141 MMOL/L (ref 136–145)
TIBC SERPL-MCNC: 190 UG/DL (ref 250–450)
TOTAL CELLS COUNTED SPEC: 100
WBC # BLD AUTO: 5.59 THOUSAND/UL (ref 4.31–10.16)

## 2021-08-13 PROCEDURE — 99232 SBSQ HOSP IP/OBS MODERATE 35: CPT | Performed by: INTERNAL MEDICINE

## 2021-08-13 PROCEDURE — 86695 HERPES SIMPLEX TYPE 1 TEST: CPT | Performed by: INTERNAL MEDICINE

## 2021-08-13 PROCEDURE — 85007 BL SMEAR W/DIFF WBC COUNT: CPT | Performed by: PHYSICIAN ASSISTANT

## 2021-08-13 PROCEDURE — 86658 ENTEROVIRUS ANTIBODY: CPT | Performed by: INTERNAL MEDICINE

## 2021-08-13 PROCEDURE — 86255 FLUORESCENT ANTIBODY SCREEN: CPT | Performed by: INTERNAL MEDICINE

## 2021-08-13 PROCEDURE — 86256 FLUORESCENT ANTIBODY TITER: CPT | Performed by: INTERNAL MEDICINE

## 2021-08-13 PROCEDURE — 83540 ASSAY OF IRON: CPT | Performed by: INTERNAL MEDICINE

## 2021-08-13 PROCEDURE — 86747 PARVOVIRUS ANTIBODY: CPT | Performed by: INTERNAL MEDICINE

## 2021-08-13 PROCEDURE — 83516 IMMUNOASSAY NONANTIBODY: CPT | Performed by: INTERNAL MEDICINE

## 2021-08-13 PROCEDURE — 82728 ASSAY OF FERRITIN: CPT | Performed by: INTERNAL MEDICINE

## 2021-08-13 PROCEDURE — 85610 PROTHROMBIN TIME: CPT | Performed by: PHYSICIAN ASSISTANT

## 2021-08-13 PROCEDURE — 86696 HERPES SIMPLEX TYPE 2 TEST: CPT | Performed by: INTERNAL MEDICINE

## 2021-08-13 PROCEDURE — 86592 SYPHILIS TEST NON-TREP QUAL: CPT | Performed by: INTERNAL MEDICINE

## 2021-08-13 PROCEDURE — 99221 1ST HOSP IP/OBS SF/LOW 40: CPT | Performed by: DERMATOLOGY

## 2021-08-13 PROCEDURE — 80053 COMPREHEN METABOLIC PANEL: CPT | Performed by: PHYSICIAN ASSISTANT

## 2021-08-13 PROCEDURE — 82784 ASSAY IGA/IGD/IGG/IGM EACH: CPT | Performed by: INTERNAL MEDICINE

## 2021-08-13 PROCEDURE — 85027 COMPLETE CBC AUTOMATED: CPT | Performed by: PHYSICIAN ASSISTANT

## 2021-08-13 PROCEDURE — 83550 IRON BINDING TEST: CPT | Performed by: INTERNAL MEDICINE

## 2021-08-13 PROCEDURE — 99233 SBSQ HOSP IP/OBS HIGH 50: CPT | Performed by: FAMILY MEDICINE

## 2021-08-13 PROCEDURE — 86235 NUCLEAR ANTIGEN ANTIBODY: CPT | Performed by: INTERNAL MEDICINE

## 2021-08-13 RX ADMIN — SODIUM CHLORIDE 75 ML/HR: 0.9 INJECTION, SOLUTION INTRAVENOUS at 16:56

## 2021-08-13 NOTE — PROGRESS NOTES
GI Progress Note - Cornell Cotter 60 y o  female MRN: [id number]    Unit/Bed#: -01 Encounter: [id number]    Subjective: Demetrius Mabry is feeling well today  No significant changes overnight  Objective:     Vitals: Blood pressure 100/63, pulse 68, temperature 98 8 °F (37 1 °C), resp  rate 16, height 5' 2" (1 575 m), weight 64 kg (141 lb 1 5 oz), SpO2 97 %, not currently breastfeeding  ,Body mass index is 25 81 kg/m²  No intake or output data in the 24 hours ending 08/13/21 1213    Physical Exam:     General Appearance: Alert, appears stated age and cooperative  Lungs: Clear to auscultation bilaterally, no rales or rhonchi, no labored breathing/accessory muscle use  Heart: Regular rate and rhythm, S1, S2 normal, no murmur, click, rub or gallop  Abdomen: Soft, non-tender, non-distended; bowel sounds normal; no masses or no organomegaly  Extremities: No cyanosis, edema    Invasive Devices     Peripheral Intravenous Line            Peripheral IV 08/13/21 Distal;Right;Ventral (anterior) Forearm <1 day                Lab Results:  Results from last 7 days   Lab Units 08/13/21  0457 08/12/21  0529   WBC Thousand/uL 5 59 6 16   HEMOGLOBIN g/dL 9 1* 10 0*   HEMATOCRIT % 27 6* 29 6*   PLATELETS Thousands/uL 305 237   NEUTROS PCT %  --  33*   LYMPHS PCT %  --  51*   LYMPHO PCT % 53*  --    MONOS PCT %  --  15*   MONO PCT % 12  --    EOS PCT % 3 0     Results from last 7 days   Lab Units 08/13/21  0457   POTASSIUM mmol/L 3 9   CHLORIDE mmol/L 108   CO2 mmol/L 24   BUN mg/dL 12   CREATININE mg/dL 0 61   CALCIUM mg/dL 7 5*   ALK PHOS U/L 260*   ALT U/L 621*   AST U/L 134*     Results from last 7 days   Lab Units 08/13/21  0457   INR  1 08           Imaging Studies: I have personally reviewed pertinent imaging studies        US right upper quadrant  Result Date: 8/12/2021  Impression: Normal  Workstation performed: [id number]       Assessment and Plan:     Elevated LFTs  - Presented with erythema multiforme starting Friday with fatigue and newly elevated LFTs in a mixed pattern likely indicative of a viral or autoimmune process  - LFTs have improved over the past 48 hours; further workup pending for autoimmune markers but RUQ US is normal and acute hepatitis panel is negative  - Recommend repeat LFTs in 1 week  - Derm consultation pending, consider reaching out to see if any anti-viral treatment is indicated given the elevated HSV 1 IgG though this is not necessarily indicative of an acute HSV1 illness  - Remaining workup for the erythema multiforme can be followed up as outpatient by PCP and derm if they are unable to see her inpatient  - Pt is stable for discharge from GI standpoint      The patient will be seen by Dr Bolivar Lackey   GI will sign off

## 2021-08-13 NOTE — PLAN OF CARE
Problem: PAIN - ADULT  Goal: Verbalizes/displays adequate comfort level or baseline comfort level  Description: Interventions:  - Encourage patient to monitor pain and request assistance  - Assess pain using appropriate pain scale  - Administer analgesics based on type and severity of pain and evaluate response  - Implement non-pharmacological measures as appropriate and evaluate response  - Consider cultural and social influences on pain and pain management  - Notify physician/advanced practitioner if interventions unsuccessful or patient reports new pain  Outcome: Progressing     Problem: INFECTION - ADULT  Goal: Absence or prevention of progression during hospitalization  Description: INTERVENTIONS:  - Assess and monitor for signs and symptoms of infection  - Monitor lab/diagnostic results  - Monitor all insertion sites, i e  indwelling lines, tubes, and drains  - Monitor endotracheal if appropriate and nasal secretions for changes in amount and color  - [place] appropriate cooling/warming therapies per order  - Administer medications as ordered  - Instruct and encourage patient and family to use good hand hygiene technique  - Identify and instruct in appropriate isolation precautions for identified infection/condition  Outcome: Progressing  Goal: Absence of fever/infection during neutropenic period  Description: INTERVENTIONS:  - Monitor WBC    Outcome: Progressing

## 2021-08-13 NOTE — CONSULTS
Consultation - Ryan Younger 60 y o  female MRN: [id number]    Unit/Bed#: -01 Encounter: [id number]      Assessment/Plan     Assessment:  resolving erythema multiforme  Plan:  Including blood tests we had discussed previously including EBV CMV and hepatitis which have also has shown nothing of concern  Should hopefully continue to improve with just rest   In addition would also consider mycoplasma titers    History of Present Illness   HPI: Ryan Younger is a 60y o  year old female who presents with with no real past medical history of concern except for his seasonal allergies presented with a five-day history of a fatigue and feeling of a flu-like illness which developed into a rash was seen at Urgent Care was felt to have a viral exanthem and started on steroids the burning and the rash continued and patient eventually had oral mucosal involvement by day 5 and was admitted with significant transaminitis   On Wednesday night when patient was admitted I was consulted to see of her photos which I felt to consistent with erythema multiforme of felt that this may be related to some type of viral process especially with the liver enzyme abnormalities and suggested some of the work that preceded    Patient still says feels fatigued but notes the rash has been slowly improving her feet still feel tender    Consults    Review of Systems    Historical Information   Past Medical History:   Diagnosis Date    Arthritis     Vocal cord nodules     removed     Past Surgical History:   Procedure Laterality Date     SECTION      LARYNGOSCOPY      with stripping of vocal cords     Social History   Social History     Substance and Sexual Activity   Alcohol Use Yes    Alcohol/week: 2 0 standard drinks    Types: 2 Glasses of wine per week    Comment: weekly     Social History     Substance and Sexual Activity   Drug Use No     Social History     Tobacco Use   Smoking Status Never Smoker Smokeless Tobacco Never Used     Family History:   Family History   Problem Relation Age of Onset    Lung cancer Mother    [de-identified] Breast cancer Mother 39    Diabetes type I Mother     Lung cancer Father     Cancer Father     Diabetes type I Father     Diabetes type I Child     Diabetes Daughter         mellitus    Diabetes Son         mellitus    Mental illness Son         mental disorder    No Known Problems Sister     No Known Problems Daughter        Meds/Allergies   current meds:   Current Facility-Administered Medications   Medication Dose Route Frequency    al mag oxide-diphenhydramine-lidocaine viscous (MAGIC MOUTHWASH) suspension 10 mL  10 mL Swish & Swallow Q4H PRN    aluminum-magnesium hydroxide-simethicone (MYLANTA) oral suspension 30 mL  30 mL Oral Q6H PRN    enoxaparin (LOVENOX) subcutaneous injection 40 mg  40 mg Subcutaneous Daily    lidocaine (LMX) 4 % cream   Topical 4x Daily PRN    ondansetron (ZOFRAN) injection 4 mg  4 mg Intravenous Q6H PRN    polyethylene glycol (MIRALAX) packet 17 g  17 g Oral Daily PRN    sodium chloride 0 9 % infusion  75 mL/hr Intravenous Continuous    and PTA meds:   Prior to Admission Medications   Prescriptions Last Dose Informant Patient Reported? Taking?    Cholecalciferol (VITAMIN D3) 50 MCG (2000 UT) capsule Not Taking at Unknown time Self No No   Sig: Take 1 capsule (2,000 Units total) by mouth daily   Patient not taking: Reported on 8/12/2021   Lidocaine Viscous HCl (XYLOCAINE) 2 % mucosal solution Not Taking at Unknown time  No No   Sig: Swish and swallow 15 mL 4 (four) times a day as needed for mouth pain or discomfort   Patient not taking: Reported on 8/12/2021   fexofenadine (ALLEGRA ALLERGY) 180 MG tablet Not Taking at Unknown time Self Yes No   Sig: Take by mouth   Patient not taking: Reported on 8/9/2021   methylPREDNISolone 4 MG tablet therapy pack Not Taking at Unknown time  No No   Sig: Use as directed on package   Patient not taking: Reported on 8/12/2021   multivitamin-minerals (CENTRUM) tablet Not Taking at Unknown time Self Yes No   Sig: Take by mouth   Patient not taking: Reported on 8/12/2021   predniSONE 10 mg tablet Not Taking at Unknown time  No No   Sig: 3 tabs daily for 3 days, 2 tabs daily for 3 days, 1 tab daily for 3 days   Patient not taking: Reported on 8/12/2021   vitamin B-12 (VITAMIN B-12) 1,000 mcg tablet Not Taking at Unknown time Self No No   Sig: Take 1 tablet (1,000 mcg total) by mouth daily   Patient not taking: Reported on 8/9/2021      Facility-Administered Medications: None     No Known Allergies    Objective   Vitals: Blood pressure 105/67, pulse 66, temperature 98 8 °F (37 1 °C), resp  rate 16, height 5' 2" (1 575 m), weight 64 kg (141 lb 1 5 oz), SpO2 98 %, not currently breastfeeding  Physical Exam erythema noted in the chest area no definitive target lesions palms and soles also affected and the mucosal surface of the mouth also affected and appears much improved from previous photos    Lab Results: I have personally reviewed pertinent reports  Pathology: I have personally reviewed pertinent reports  Counseling / Coordination of Care  Total floor / unit time spent today 40 minutes  Greater than 50% of total time was spent with the patient and / or family counseling and / or coordination of care

## 2021-08-13 NOTE — ASSESSMENT & PLAN NOTE
· Appears like EM  · Hepatitis negative, lyme negative  · HSV IgG abnormal but does not bear clinical significance as its not an active infectioni  · Pending serology RADHA  · Pending EBV,CMV,coxsackie A/B  · Pending dermatology input  · RUQ US - normal  · Discussed with GI- cleared and signed off  · Improving LFTs

## 2021-08-13 NOTE — PROGRESS NOTES
[facility]  Progress Note - Cassius Islas 1955, 60 y o  female MRN: [id number]  Unit/Bed#: -Loyda Encounter: [id number]  Primary Care Provider: Chris Langston MD   Date and time admitted to hospital: 2021  6:50 PM    * Rash of body  Assessment & Plan  · Appears like EM  · Hepatitis negative, lyme negative  · HSV IgG abnormal but does not bear clinical significance as its not an active infectioni  · Pending serology RADHA  · Pending EBV,CMV,coxsackie A/B  · Pending dermatology input  · RUQ US - normal  · Discussed with GI- cleared and signed off  · Improving LFTs          Transaminitis  Assessment & Plan  · Improving  · RUQ US normal  · Likely transient viral syndrome  · No GI symptoms    Results from last 7 days   Lab Units 21  0457 21  0529 21  1336   ALT U/L 621* 771* 1,153*   AST U/L 134* 178* 337*   ALK PHOS U/L 260* 218* 257*   TOTAL BILIRUBIN mg/dL 0 81 0 93 1 50*             VTE Pharmacologic Prophylaxis: VTE Score: 3 pt wants to ambulate and does not want lovenox    Patient Centered Rounds: I performed bedside rounds with nursing staff today  Discussions with Specialists or Other Care Team Provider: yes GI, derm    Education and Discussions with Family / Patient: Updated  () via phone  Time Spent for Care: 45 minutes  More than 50% of total time spent on counseling and coordination of care as described above  Current Length of Stay: 2 day(s)  Current Patient Status: Inpatient   Certification Statement: The patient will continue to require additional inpatient hospital stay due to above mentioned A&P  Discharge Plan: Anticipate discharge tomorrow to home      Code Status: Level 1 - Full Code    Subjective:   Seen and examined at bedside  C/o pain in the soles due to the rash  The rash overall looks much improved      Objective:     Vitals:   Temp (24hrs), Av 9 °F (37 2 °C), Min:98 3 °F (36 8 °C), Max:99 7 °F (37 6 °C)    Temp:  [98 3 °F (36 8 °C)-99 7 °F (37 6 °C)] 98 8 °F (37 1 °C)  HR:  [68-71] 68  Resp:  [16] 16  BP: ()/(49-63) 100/63  SpO2:  [97 %] 97 %  Body mass index is 25 81 kg/m²       Input and Output Summary (last 24 hours):   No intake or output data in the 24 hours ending 08/13/21 1411    Physical Exam:   Physical Exam General- Awake, alert and oriented x 3, looks comfortable  HEENT- Normocephalic, atraumatic, oral mucosa- moist  Neck- Supple, No carotid bruit, no JVD  CVS- Normal S1/ S2, Regular rate and rhythm, No murmur, No edema  Respiratory system- B/L clear breath sounds, no wheezing  Abdomen- Soft, Non distended, no tenderness, Bowel sound- present 4 quads  Genitourinary- No suprapubic tenderness, No CVA tenderness  Skin- EM like lesions were noted yesterday but are improving today, tender soles  Musculoskeletal- No gross deformity  Psych- No acute psychosis  CNS- CN II- XII grossly intact, No acute focal neurologic deficit noted      Additional Data:     Labs:  Results from last 7 days   Lab Units 08/13/21 0457 08/12/21  0529   WBC Thousand/uL 5 59 6 16   HEMOGLOBIN g/dL 9 1* 10 0*   HEMATOCRIT % 27 6* 29 6*   PLATELETS Thousands/uL 305 237   NEUTROS PCT %  --  33*   LYMPHS PCT %  --  51*   LYMPHO PCT % 53*  --    MONOS PCT %  --  15*   MONO PCT % 12  --    EOS PCT % 3 0     Results from last 7 days   Lab Units 08/13/21  0457   SODIUM mmol/L 141   POTASSIUM mmol/L 3 9   CHLORIDE mmol/L 108   CO2 mmol/L 24   BUN mg/dL 12   CREATININE mg/dL 0 61   ANION GAP mmol/L 9   CALCIUM mg/dL 7 5*   ALBUMIN g/dL 2 5*   TOTAL BILIRUBIN mg/dL 0 81   ALK PHOS U/L 260*   ALT U/L 621*   AST U/L 134*   GLUCOSE RANDOM mg/dL 82     Results from last 7 days   Lab Units 08/13/21  0457   INR  1 08                   Lines/Drains:  Invasive Devices     Peripheral Intravenous Line            Peripheral IV 08/13/21 Distal;Right;Ventral (anterior) Forearm <1 day                      Imaging: No pertinent imaging reviewed  Recent Cultures (last 7 days):         Last 24 Hours Medication List:   Current Facility-Administered Medications   Medication Dose Route Frequency Provider Last Rate    al mag oxide-diphenhydramine-lidocaine viscous  10 mL Swish & Swallow Q4H PRN BeauINDIO Rosales-JOSEPH      aluminum-magnesium hydroxide-simethicone  30 mL Oral Q6H PRN Beaulin Green, PA-C      enoxaparin  40 mg Subcutaneous Daily INDIO Nava-JOSEPH      lidocaine   Topical 4x Daily PRN INDIO Gavin-C      ondansetron  4 mg Intravenous Q6H PRN INDIO Nava-C      polyethylene glycol  17 g Oral Daily PRN INDIO Gavin-C      sodium chloride  75 mL/hr Intravenous Continuous Beau Green PA-C 75 mL/hr (08/12/21 3747)        Today, Patient Was Seen By: Chris Espitia MD    **Please Note: This note may have been constructed using a voice recognition system  **

## 2021-08-13 NOTE — ASSESSMENT & PLAN NOTE
· Improving  · RUQ US normal  · Likely transient viral syndrome  · No GI symptoms    Results from last 7 days   Lab Units 08/13/21  0457 08/12/21  0529 08/11/21  1336   ALT U/L 621* 771* 1,153*   AST U/L 134* 178* 337*   ALK PHOS U/L 260* 218* 257*   TOTAL BILIRUBIN mg/dL 0 81 0 93 1 50*

## 2021-08-14 VITALS
WEIGHT: 141.09 LBS | HEIGHT: 62 IN | BODY MASS INDEX: 25.96 KG/M2 | DIASTOLIC BLOOD PRESSURE: 71 MMHG | TEMPERATURE: 98.5 F | OXYGEN SATURATION: 98 % | HEART RATE: 66 BPM | SYSTOLIC BLOOD PRESSURE: 121 MMHG | RESPIRATION RATE: 18 BRPM

## 2021-08-14 PROBLEM — L51.9 ERYTHEMA MULTIFORME: Status: ACTIVE | Noted: 2021-08-11

## 2021-08-14 LAB
ACTIN IGG SERPL-ACNC: 7 UNITS (ref 0–19)
ALBUMIN SERPL BCP-MCNC: 2.4 G/DL (ref 3.5–5)
ALP SERPL-CCNC: 279 U/L (ref 46–116)
ALT SERPL W P-5'-P-CCNC: 525 U/L (ref 12–78)
ANION GAP SERPL CALCULATED.3IONS-SCNC: 8 MMOL/L (ref 4–13)
AST SERPL W P-5'-P-CCNC: 128 U/L (ref 5–45)
B19V IGG SER IA-ACNC: 6.2 INDEX (ref 0–0.8)
B19V IGM SER IA-ACNC: 0.1 INDEX (ref 0–0.8)
BASOPHILS # BLD AUTO: 0.07 THOUSANDS/ΜL (ref 0–0.1)
BASOPHILS NFR BLD AUTO: 1 % (ref 0–1)
BILIRUB SERPL-MCNC: 0.68 MG/DL (ref 0.2–1)
BUN SERPL-MCNC: 8 MG/DL (ref 5–25)
CALCIUM ALBUM COR SERPL-MCNC: 8.9 MG/DL (ref 8.3–10.1)
CALCIUM SERPL-MCNC: 7.6 MG/DL (ref 8.3–10.1)
CHLORIDE SERPL-SCNC: 109 MMOL/L (ref 100–108)
CO2 SERPL-SCNC: 26 MMOL/L (ref 21–32)
CREAT SERPL-MCNC: 0.65 MG/DL (ref 0.6–1.3)
ENDOMYSIUM IGA SER QL: NEGATIVE
EOSINOPHIL # BLD AUTO: 0.17 THOUSAND/ΜL (ref 0–0.61)
EOSINOPHIL NFR BLD AUTO: 3 % (ref 0–6)
ERYTHROCYTE [DISTWIDTH] IN BLOOD BY AUTOMATED COUNT: 14.4 % (ref 11.6–15.1)
GFR SERPL CREATININE-BSD FRML MDRD: 94 ML/MIN/1.73SQ M
GLIADIN PEPTIDE IGA SER-ACNC: 2 UNITS (ref 0–19)
GLIADIN PEPTIDE IGG SER-ACNC: 2 UNITS (ref 0–19)
GLUCOSE SERPL-MCNC: 93 MG/DL (ref 65–140)
HCT VFR BLD AUTO: 28.2 % (ref 34.8–46.1)
HGB BLD-MCNC: 9.3 G/DL (ref 11.5–15.4)
HSV1 IGG SER IA-ACNC: 21.1 INDEX (ref 0–0.9)
HSV2 IGG SER IA-ACNC: <0.91 INDEX (ref 0–0.9)
IGA SERPL-MCNC: 274 MG/DL (ref 87–352)
IMM GRANULOCYTES # BLD AUTO: 0.06 THOUSAND/UL (ref 0–0.2)
IMM GRANULOCYTES NFR BLD AUTO: 1 % (ref 0–2)
LYMPHOCYTES # BLD AUTO: 2.96 THOUSANDS/ΜL (ref 0.6–4.47)
LYMPHOCYTES NFR BLD AUTO: 53 % (ref 14–44)
MCH RBC QN AUTO: 30.3 PG (ref 26.8–34.3)
MCHC RBC AUTO-ENTMCNC: 33 G/DL (ref 31.4–37.4)
MCV RBC AUTO: 92 FL (ref 82–98)
MITOCHONDRIA M2 IGG SER-ACNC: <20 UNITS (ref 0–20)
MONOCYTES # BLD AUTO: 0.72 THOUSAND/ΜL (ref 0.17–1.22)
MONOCYTES NFR BLD AUTO: 13 % (ref 4–12)
NEUTROPHILS # BLD AUTO: 1.59 THOUSANDS/ΜL (ref 1.85–7.62)
NEUTS SEG NFR BLD AUTO: 29 % (ref 43–75)
NRBC BLD AUTO-RTO: 0 /100 WBCS
PLATELET # BLD AUTO: 349 THOUSANDS/UL (ref 149–390)
PMV BLD AUTO: 9.9 FL (ref 8.9–12.7)
POTASSIUM SERPL-SCNC: 3.9 MMOL/L (ref 3.5–5.3)
PROT SERPL-MCNC: 5.3 G/DL (ref 6.4–8.2)
RBC # BLD AUTO: 3.07 MILLION/UL (ref 3.81–5.12)
SODIUM SERPL-SCNC: 143 MMOL/L (ref 136–145)
TTG IGA SER-ACNC: <2 U/ML (ref 0–3)
TTG IGG SER-ACNC: 6 U/ML (ref 0–5)
WBC # BLD AUTO: 5.57 THOUSAND/UL (ref 4.31–10.16)

## 2021-08-14 PROCEDURE — 85025 COMPLETE CBC W/AUTO DIFF WBC: CPT | Performed by: PHYSICIAN ASSISTANT

## 2021-08-14 PROCEDURE — 86738 MYCOPLASMA ANTIBODY: CPT | Performed by: DERMATOLOGY

## 2021-08-14 PROCEDURE — 80053 COMPREHEN METABOLIC PANEL: CPT | Performed by: PHYSICIAN ASSISTANT

## 2021-08-14 PROCEDURE — 99239 HOSP IP/OBS DSCHRG MGMT >30: CPT | Performed by: FAMILY MEDICINE

## 2021-08-14 RX ADMIN — SODIUM CHLORIDE 75 ML/HR: 0.9 INJECTION, SOLUTION INTRAVENOUS at 05:30

## 2021-08-14 NOTE — ASSESSMENT & PLAN NOTE
· Improving  · RUQ US normal  · Likely transient viral syndrome  · No GI symptoms    Results from last 7 days   Lab Units 08/14/21  0437 08/13/21  0457 08/12/21  0529 08/11/21  1336   ALT U/L 525* 621* 771* 1,153*   AST U/L 128* 134* 178* 337*   ALK PHOS U/L 279* 260* 218* 257*   TOTAL BILIRUBIN mg/dL 0 68 0 81 0 93 1 50*

## 2021-08-14 NOTE — CASE MANAGEMENT
Case Management Assessment & Discharge Planning Note    Patient name Esefide Vega  Location /-49 MRN [id number]  : 1955 Date 2021       Current Admission Date: 2021  Current Admission Diagnosis:  Rash of body   Patient Active Problem List   Diagnosis    Mild vitamin D deficiency    Encounter for gynecological examination without abnormal finding    Osteopenia of neck of femur    Other hyperlipidemia    Elevated HDL    Elevated LDL cholesterol level    Rash of body    Transaminitis    Previous Admission - Discharge Date:    LOS (days): 3  Geometric Mean LOS (GMLOS) (days): 2 70  Days to GMLOS:0 1 Previous Discharge Diagnosis:  No discharge information exists for this patient  Risk of Unplanned Readmission Score  Predictive Model Details          9 (Low)  Factor Value    Calculated 2021 08:04 19% Number of active Rx orders 15    Risk of Unplanned Readmission Model 16% Latest calcium low (7 6 mg/dL)     12% Imaging order present in last 6 months     11% Latest hemoglobin low (9 3 g/dL)     10% Number of ED visits in last six months 1     9% Age 60     8% Active anticoagulant Rx order present     8% Active corticosteroid Rx order present     4% Current length of stay 2 452 days     4% Future appointment scheduled         BUNDLE: Bundled Patient Payment:  (no)    Reason for Referral:    OBJECTIVE:  Pt is a 60y o  year old /[employer], white or  [1], female with [employer] preference of [address] admitted on   6:50 PM  Pt is admitted to [facility] 87 320-01 at [facility] with complaints of Rash of body     Current admission status: Inpatient       Preferred Pharmacy:   [id number] [id number]  Phone: [phone] Fax: [phone]    [facility], [address] [facility] [address]  Phone: [phone] Fax: [phone]    [id number] [address]  [address]   [address]  Phone: [phone] Fax: [phone]    Primary Care Provider: Tori Cooley MD    Primary Insurance: MEDICARE  Secondary Insurance: Jason BYRD    ASSESSMENT:  [address] Representative - Spouse   Primary Phone: [phone] [facility])                         Jamila Ding [address] of Residence: [place]    Readmission Root Cause  30 Day Readmission: No                             Means of Transportation  Was application for public transport provided?: No    DISCHARGE DETAILS:    Discharge planning discussed with[de-identified] chart review       Contacts  Patient Contacts: Vashti Munson to Patient[de-identified] Family  Contact Method: Phone  Phone Number: [phone] [address]         Is the patient interested in [place] [address] at discharge?: No    DME Referral Provided  Referral made for DME?: No              Discharge Destination Plan[de-identified] Home  Transportation at Discharge?: Yes (family will transport)

## 2021-08-14 NOTE — DISCHARGE INSTR - AVS FIRST PAGE
- use magic mouth wash as needed for oral lesions  - take good rest  - follow up with your PCP to check on the pending results from this hospitalization  - followup in 1 week

## 2021-08-14 NOTE — ASSESSMENT & PLAN NOTE
· Resolving significantly  · Hepatitis negative, lyme negative  · HSV IgG abnormal but does not bear clinical significance as its not an active infectioni  · Pending serology RADHA  · Pending EBV,CMV,coxsackie A/B  · Pending dermatology input  · RUQ US - normal  · Discussed with GI- cleared and signed off  · Improving LFTs

## 2021-08-14 NOTE — NURSING NOTE
Pt discharged home with   Independent for mobility  Continent of bowel and bladder  Pain and rash improving  Med list and instructions reviewed with the patient  IV removed prior to discharge  No questions or concerns at this time

## 2021-08-14 NOTE — DISCHARGE INSTRUCTIONS
Acute Rash   WHAT YOU NEED TO KNOW:   A rash is irritated, red, or itchy skin or mucus membranes, such as the lining of your nose or throat  Acute means the rash starts suddenly, worsens quickly, and lasts a short time  Common causes include a disease or infection, a reaction to something you are allergic to, or certain medicines  DISCHARGE INSTRUCTIONS:   Seek care immediately if:   · You have sudden trouble breathing or chest pain  · You are vomiting, have a headache or muscle aches, and your throat hurts  Call your doctor or dermatologist if:   · You have a fever  · You get open wounds from scratching your skin, or you have a wound that is red, swollen, or painful  · Your rash lasts longer than 3 months  · You have swelling or pain in your joints  · You have questions or concerns about your condition or care  Medicines:  If your rash does not go away on its own, you may need any of the following:  · Antihistamines  may be given to help decrease itching  · Steroids  may be given to decrease inflammation  · Antibiotics  help fight or prevent a bacterial infection  · Take your medicine as directed  Contact your healthcare provider if you think your medicine is not helping or if you have side effects  Tell him of her if you are allergic to any medicine  Keep a list of the medicines, vitamins, and herbs you take  Include the amounts, and when and why you take them  Bring the list or the pill bottles to follow-up visits  Carry your medicine list with you in case of an emergency  Prevent a rash or care for your skin when you have a rash:  Dry skin can lead to more problems  Do not scratch your skin if it itches  You may cause a skin infection by scratching  The following may prevent dry skin, and help your skin look better:  · Help soothe your rash  Apply thick cream lotions or petroleum jelly  Cool compresses may also soothe your skin   Apply a cool compress or a cool, wet towel, and then cover it with a dry towel  · Use lukewarm water when you bathe  Hot water may damage your skin more  Pat your skin dry  Do not rub your skin with a towel  · Use detergents, soaps, shampoos, and bubble baths  made for sensitive skin  · Wear clothes made of cotton instead of nylon or wool  Cotton is softer, so it will not hurt your skin as much  Follow up with your healthcare providers as directed:  A dermatologist may help find the cause of your rash or help plan or change treatment  A dietitian may help with meal planning if you have a food allergy  Write down your questions so you remember to ask them during your visits  © Copyright [employer] 2021 Information is for End User's use only and may not be sold, redistributed or otherwise used for commercial purposes  All illustrations and images included in CareNotes® are the copyrighted property of A D A M , Inc  or Kely Osborne  The above information is an  only  It is not intended as medical advice for individual conditions or treatments  Talk to your doctor, nurse or pharmacist before following any medical regimen to see if it is safe and effective for you

## 2021-08-14 NOTE — DISCHARGE SUMMARY
[address]  Discharge- Sebastián Luong 1955, 60 y o  female MRN: [id number]  Unit/Bed#: -01 Encounter: [id number]  Primary Care Provider: Sergio Ramos MD   Date and time admitted to hospital: 8/11/2021  6:50 PM    * Erythema multiforme  Assessment & Plan  · Resolving significantly  · Hepatitis negative, lyme negative  · HSV IgG abnormal but does not bear clinical significance as its not an active infectioni  · Pending serology RADHA  · Pending EBV,CMV,coxsackie A/B  · Pending dermatology input  · RUQ US - normal  · Discussed with GI- cleared and signed off  · Improving LFTs          Transaminitis  Assessment & Plan  · Improving  · RUQ US normal  · Likely transient viral syndrome  · No GI symptoms    Results from last 7 days   Lab Units 08/14/21  0437 08/13/21  0457 08/12/21  0529 08/11/21  1336   ALT U/L 525* 621* 771* 1,153*   AST U/L 128* 134* 178* 337*   ALK PHOS U/L 279* 260* 218* 257*   TOTAL BILIRUBIN mg/dL 0 68 0 81 0 93 1 50*           Medical Problems     Resolved Problems  Date Reviewed: 8/14/2021    None              Discharging Physician / Practitioner: Lloyd Espinoza MD  PCP: Sergio Ramos MD  Admission Date:   Admission Orders (From admission, onward)     Ordered        08/11/21 2114  Inpatient Admission  Once                   Discharge Date: 08/14/21    Consultations During Hospital Stay:  · Dermatology  · Gastroenterology    Procedures Performed:   · Right upper quadrant ultrasound-normal    Significant Findings / Test Results:   · Transaminitis likely viral    Incidental Findings:   · None     Test Results Pending at Discharge (will require follow up):   · CMV, EBV, RADHA, mycoplasma     Outpatient Tests Requested:  · None    Complications:  None    Reason for Admission:  Erythema multiforme    Hospital Course:   Sebastián Luong is a 60 y o  female patient who originally presented to the hospital on 8/11/2021 due to erythema multiforme likely secondary to some unknown viral illness  Results are pending and can be followed outpatient  Dermatology saw the patient and recommended no steroids at discharge  GI saw the patient and cleared for discharge as well  Please see above list of diagnoses and related plan for additional information  Condition at Discharge: stable    Discharge Day Visit / Exam:   Subjective:  I am fine  Vitals: Blood Pressure: 121/71 (08/14/21 0657)  Pulse: 66 (08/13/21 1537)  Temperature: 98 5 °F (36 9 °C) (08/14/21 0657)  Temp Source: Oral (08/11/21 2200)  Respirations: 18 (08/14/21 0657)  Height: 5' 2" (157 5 cm) (08/12/21 1944)  Weight - Scale: 64 kg (141 lb 1 5 oz) (08/12/21 1944)  SpO2: 98 % (08/13/21 1537)  Exam:   Physical Exam General- Awake, alert and oriented x 3, looks comfortable  HEENT- Normocephalic, atraumatic, oral mucosa- moist  Neck- Supple, No carotid bruit, no JVD  CVS- Normal S1/ S2, Regular rate and rhythm, No murmur, No edema  Respiratory system- B/L clear breath sounds, no wheezing  Abdomen- Soft, Non distended, no tenderness, Bowel sound- present 4 quads  Genitourinary- No suprapubic tenderness, No CVA tenderness  Skin-multiple erythema multiforme like skin lesions which are improving, oral lesions have almost subsided  Musculoskeletal- No gross deformity  Psych- No acute psychosis  CNS- CN II- XII grossly intact, No acute focal neurologic deficit noted      Discussion with Family: Updated  () via phone  Discharge instructions/Information to patient and family:   See after visit summary for information provided to patient and family  Provisions for Follow-Up Care:  See after visit summary for information related to follow-up care and any pertinent home health orders  Disposition:   Home    Planned Readmission:  No     Discharge Statement:  I spent 35 minutes discharging the patient  This time was spent on the day of discharge   I had direct contact with the patient on the day of discharge  Greater than 50% of the total time was spent examining patient, answering all patient questions, arranging and discussing plan of care with patient as well as directly providing post-discharge instructions  Additional time then spent on discharge activities  Discharge Medications:  See after visit summary for reconciled discharge medications provided to patient and/or family        **Please Note: This note may have been constructed using a voice recognition system**

## 2021-08-16 ENCOUNTER — TRANSITIONAL CARE MANAGEMENT (OUTPATIENT)
Dept: INTERNAL MEDICINE CLINIC | Facility: CLINIC | Age: 66
End: 2021-08-16

## 2021-08-16 LAB
M PNEUMO IGG SER IA-ACNC: 1127 U/ML (ref 0–99)
M PNEUMO IGM SER IA-ACNC: <770 U/ML (ref 0–769)

## 2021-08-17 LAB
CMV DNA SERPL NAA+PROBE-ACNC: NEGATIVE IU/ML
CMV DNA SERPL NAA+PROBE-LOG IU: NORMAL LOG10 IU/ML

## 2021-08-18 ENCOUNTER — OFFICE VISIT (OUTPATIENT)
Dept: INTERNAL MEDICINE CLINIC | Facility: CLINIC | Age: 66
End: 2021-08-18
Payer: MEDICARE

## 2021-08-18 ENCOUNTER — APPOINTMENT (OUTPATIENT)
Dept: LAB | Facility: HOSPITAL | Age: 66
End: 2021-08-18
Attending: INTERNAL MEDICINE
Payer: MEDICARE

## 2021-08-18 ENCOUNTER — HOSPITAL ENCOUNTER (OUTPATIENT)
Dept: RADIOLOGY | Facility: HOSPITAL | Age: 66
Discharge: HOME | End: 2021-08-18
Attending: INTERNAL MEDICINE
Payer: MEDICARE

## 2021-08-18 VITALS
BODY MASS INDEX: 23 KG/M2 | HEART RATE: 64 BPM | WEIGHT: 125 LBS | OXYGEN SATURATION: 100 % | SYSTOLIC BLOOD PRESSURE: 120 MMHG | RESPIRATION RATE: 16 BRPM | HEIGHT: 62 IN | TEMPERATURE: 98.6 F | DIASTOLIC BLOOD PRESSURE: 60 MMHG

## 2021-08-18 DIAGNOSIS — L51.9 ERYTHEMA MULTIFORME: ICD-10-CM

## 2021-08-18 DIAGNOSIS — A49.3 INFECTION, MYCOPLASMA: Primary | ICD-10-CM

## 2021-08-18 DIAGNOSIS — A49.3 INFECTION, MYCOPLASMA: ICD-10-CM

## 2021-08-18 DIAGNOSIS — R74.01 TRANSAMINITIS: ICD-10-CM

## 2021-08-18 LAB
ALBUMIN SERPL BCP-MCNC: 3.5 G/DL (ref 3.5–5)
ALP SERPL-CCNC: 264 U/L (ref 46–116)
ALT SERPL W P-5'-P-CCNC: 300 U/L (ref 12–78)
ANION GAP SERPL CALCULATED.3IONS-SCNC: 10 MMOL/L (ref 4–13)
AST SERPL W P-5'-P-CCNC: 49 U/L (ref 5–45)
BASOPHILS # BLD AUTO: 0.06 THOUSANDS/ΜL (ref 0–0.1)
BASOPHILS NFR BLD AUTO: 1 % (ref 0–1)
BILIRUB SERPL-MCNC: 0.56 MG/DL (ref 0.2–1)
BUN SERPL-MCNC: 15 MG/DL (ref 5–25)
CALCIUM SERPL-MCNC: 8.7 MG/DL (ref 8.3–10.1)
CHLORIDE SERPL-SCNC: 101 MMOL/L (ref 100–108)
CO2 SERPL-SCNC: 27 MMOL/L (ref 21–32)
CREAT SERPL-MCNC: 0.7 MG/DL (ref 0.6–1.3)
CV B1 AB TITR SER CF: NEGATIVE {TITER}
CV B2 AB TITR SER CF: ABNORMAL {TITER}
CV B3 AB TITR SER CF: NEGATIVE {TITER}
CV B4 AB TITR SER CF: NEGATIVE {TITER}
CV B5 AB TITR SER CF: NEGATIVE {TITER}
CV B6 AB TITR SER CF: NEGATIVE {TITER}
EOSINOPHIL # BLD AUTO: 0.13 THOUSAND/ΜL (ref 0–0.61)
EOSINOPHIL NFR BLD AUTO: 2 % (ref 0–6)
ERYTHROCYTE [DISTWIDTH] IN BLOOD BY AUTOMATED COUNT: 15.2 % (ref 11.6–15.1)
GFR SERPL CREATININE-BSD FRML MDRD: 92 ML/MIN/1.73SQ M
GLUCOSE P FAST SERPL-MCNC: 89 MG/DL (ref 65–99)
HCT VFR BLD AUTO: 34.3 % (ref 34.8–46.1)
HGB BLD-MCNC: 11 G/DL (ref 11.5–15.4)
IMM GRANULOCYTES # BLD AUTO: 0.12 THOUSAND/UL (ref 0–0.2)
IMM GRANULOCYTES NFR BLD AUTO: 1 % (ref 0–2)
LYMPHOCYTES # BLD AUTO: 3.4 THOUSANDS/ΜL (ref 0.6–4.47)
LYMPHOCYTES NFR BLD AUTO: 40 % (ref 14–44)
MCH RBC QN AUTO: 29.9 PG (ref 26.8–34.3)
MCHC RBC AUTO-ENTMCNC: 32.1 G/DL (ref 31.4–37.4)
MCV RBC AUTO: 93 FL (ref 82–98)
MONOCYTES # BLD AUTO: 0.56 THOUSAND/ΜL (ref 0.17–1.22)
MONOCYTES NFR BLD AUTO: 7 % (ref 4–12)
NEUTROPHILS # BLD AUTO: 4.15 THOUSANDS/ΜL (ref 1.85–7.62)
NEUTS SEG NFR BLD AUTO: 49 % (ref 43–75)
NRBC BLD AUTO-RTO: 0 /100 WBCS
PLATELET # BLD AUTO: 564 THOUSANDS/UL (ref 149–390)
PMV BLD AUTO: 9 FL (ref 8.9–12.7)
POTASSIUM SERPL-SCNC: 4.2 MMOL/L (ref 3.5–5.3)
PROT SERPL-MCNC: 7.4 G/DL (ref 6.4–8.2)
RBC # BLD AUTO: 3.68 MILLION/UL (ref 3.81–5.12)
SODIUM SERPL-SCNC: 138 MMOL/L (ref 136–145)
WBC # BLD AUTO: 8.42 THOUSAND/UL (ref 4.31–10.16)

## 2021-08-18 PROCEDURE — 36415 COLL VENOUS BLD VENIPUNCTURE: CPT

## 2021-08-18 PROCEDURE — 71046 X-RAY EXAM CHEST 2 VIEWS: CPT

## 2021-08-18 PROCEDURE — 85025 COMPLETE CBC W/AUTO DIFF WBC: CPT

## 2021-08-18 PROCEDURE — 80053 COMPREHEN METABOLIC PANEL: CPT

## 2021-08-18 PROCEDURE — 99496 TRANSJ CARE MGMT HIGH F2F 7D: CPT | Performed by: INTERNAL MEDICINE

## 2021-08-18 NOTE — PROGRESS NOTES
Assessment/Plan:     Robert Joseph is here for hospital follow-up  She was recently discharged from [facility] after presenting with painful rash and mouth ulcers  She was seen by me in the clinic and noted to have significant transaminitis  Eventually diagnosed with erythema multiforme secondary to some viral illness  She was treated symptomatically  Multiple labs were done  Mycoplasma was positive and IgG parvovirus was positive  EBV was also positive  And herpes  Symptoms are significantly better  Her rash is much better  She would like to see an infectious disease specialist   She is eating and drinking normally  Will obtain a CBC and a CMP  We will look for down trending LFTs  She is still fatigued  No fever no chills  She was seen by Dermatology  No problem-specific Assessment & Plan notes found for this encounter  Diagnoses and all orders for this visit:    Infection, mycoplasma  -     XR chest pa & lateral; Future  -     CBC and differential; Future  -     Comprehensive metabolic panel; Future  -     Ambulatory referral to Infectious Disease; Future    Erythema multiforme    Transaminitis          Subjective:      Patient ID: Naila Zuñiga is a 60 y o  female  TCM Call (since 7/18/2021)     Date and time call was made  8/16/2021 10:08 AM    Hospital care reviewed  Records reviewed    Patient was hospitialized at  [facility]        Date of Admission  08/11/21    Date of discharge  08/14/21    Diagnosis  Erythema Multiforme    Disposition  Home    Were the patients medications reviewed and updated  No    Current Symptoms  None      TCM Call (since 7/18/2021)     Post hospital issues  None    Should patient be enrolled in anticoag monitoring? No    Scheduled for follow up?   Yes    Did you obtain your prescribed medications  Yes    Do you need help managing your prescriptions or medications  No    Is transportation to your appointment needed  No    I have advised the patient to call PCP with any new or worsening symptoms    Shaylee Lee/ma    Living Arrangements  Alone    Support System  None    Are you recieving any outpatient services  No    Are you recieving home care services  No    Are you using any community resources  No    Current waiver services  No    Have you fallen in the last 12 months  No    Interperter language line needed  No    Counseling  Patient            The following portions of the patient's history were reviewed and updated as appropriate:   She  has a past medical history of Arthritis and Vocal cord nodules  She   Patient Active Problem List    Diagnosis Date Noted    Erythema multiforme 08/11/2021    Transaminitis 08/11/2021    Elevated HDL 04/16/2020    Elevated LDL cholesterol level 04/16/2020    Other hyperlipidemia 02/07/2020    Osteopenia of neck of femur 07/19/2018    Encounter for gynecological examination without abnormal finding 06/04/2018    Mild vitamin D deficiency 09/27/2017     She  reports that she has never smoked  She has never used smokeless tobacco  She reports current alcohol use of about 2 0 standard drinks of alcohol per week  She reports that she does not use drugs  Current Outpatient Medications   Medication Sig Dispense Refill    al mag oxide-diphenhydramine-lidocaine viscous (MAGIC MOUTHWASH) 1:1:1 suspension Swish and swallow 10 mL every 4 (four) hours as needed for mouth pain or discomfort or mucositis for up to 5 days 300 mL 0    Cholecalciferol (VITAMIN D3) 50 MCG (2000 UT) capsule Take 1 capsule (2,000 Units total) by mouth daily 100 capsule 2    fexofenadine (ALLEGRA ALLERGY) 180 MG tablet Take by mouth       Lidocaine Viscous HCl (XYLOCAINE) 2 % mucosal solution Swish and swallow 15 mL 4 (four) times a day as needed for mouth pain or discomfort 15 mL 2    multivitamin-minerals (CENTRUM) tablet Take by mouth        No current facility-administered medications for this visit          Review of Systems   Constitutional: Positive for fatigue  Skin: Positive for color change and rash  All other systems reviewed and are negative  Objective:    Depression Screening and Follow-up Plan: Clincally patient does not have depression  No treatment is required  /60   Pulse 64   Temp 98 6 °F (37 °C)   Resp 16   Ht 5' 2" (1 575 m)   Wt 56 7 kg (125 lb)   LMP  (LMP Unknown)   SpO2 100%   BMI 22 86 kg/m²          Physical Exam  Vitals and nursing note reviewed  Constitutional:       Appearance: Normal appearance  She is normal weight  HENT:      Head: Normocephalic and atraumatic  Cardiovascular:      Rate and Rhythm: Normal rate and regular rhythm  Heart sounds: Normal heart sounds  Pulmonary:      Effort: Pulmonary effort is normal       Breath sounds: Normal breath sounds  Abdominal:      General: Bowel sounds are normal       Palpations: Abdomen is soft  Musculoskeletal:         General: Normal range of motion  Cervical back: Normal range of motion  Right lower leg: No edema  Left lower leg: No edema  Skin:     General: Skin is warm and dry  Findings: Rash present  Neurological:      General: No focal deficit present  Mental Status: She is alert and oriented to person, place, and time  Mental status is at baseline  Psychiatric:         Mood and Affect: Mood normal          Behavior: Behavior normal          Thought Content:  Thought content normal          Judgment: Judgment normal

## 2021-08-26 LAB
CV A16 IGG TITR SER IF: ABNORMAL TITER
CV A16 IGM TITR SER IF: NEGATIVE TITER
CV A24 IGG TITR SER IF: ABNORMAL TITER
CV A24 IGM TITR SER IF: NEGATIVE TITER
CV A7 IGG TITR SER IF: ABNORMAL TITER
CV A7 IGM TITR SER IF: NEGATIVE TITER
CV A9 IGG TITR SER IF: ABNORMAL TITER
CV A9 IGM TITR SER IF: NEGATIVE TITER

## 2021-09-14 ENCOUNTER — HOSPITAL ENCOUNTER (OUTPATIENT)
Dept: MAMMOGRAPHY | Facility: CLINIC | Age: 66
Discharge: HOME | End: 2021-09-14
Payer: COMMERCIAL

## 2021-09-14 DIAGNOSIS — Z12.31 SCREENING MAMMOGRAM, ENCOUNTER FOR: ICD-10-CM

## 2021-09-14 PROCEDURE — 77067 SCR MAMMO BI INCL CAD: CPT

## 2021-09-14 PROCEDURE — 77063 BREAST TOMOSYNTHESIS BI: CPT

## 2021-10-26 ENCOUNTER — TELEPHONE (OUTPATIENT)
Dept: INTERNAL MEDICINE CLINIC | Facility: CLINIC | Age: 66
End: 2021-10-26

## 2022-03-01 ENCOUNTER — OFFICE VISIT (OUTPATIENT)
Dept: INTERNAL MEDICINE CLINIC | Facility: CLINIC | Age: 67
End: 2022-03-01
Payer: COMMERCIAL

## 2022-03-01 VITALS
HEART RATE: 74 BPM | HEIGHT: 62 IN | SYSTOLIC BLOOD PRESSURE: 128 MMHG | OXYGEN SATURATION: 98 % | DIASTOLIC BLOOD PRESSURE: 86 MMHG | TEMPERATURE: 98.2 F | WEIGHT: 132.2 LBS | BODY MASS INDEX: 24.33 KG/M2

## 2022-03-01 DIAGNOSIS — Z13.220 ENCOUNTER FOR LIPID SCREENING FOR CARDIOVASCULAR DISEASE: ICD-10-CM

## 2022-03-01 DIAGNOSIS — Z78.0 POST-MENOPAUSAL: ICD-10-CM

## 2022-03-01 DIAGNOSIS — Z13.6 ENCOUNTER FOR LIPID SCREENING FOR CARDIOVASCULAR DISEASE: ICD-10-CM

## 2022-03-01 DIAGNOSIS — Z11.4 SCREENING FOR HIV WITHOUT PRESENCE OF RISK FACTORS: ICD-10-CM

## 2022-03-01 DIAGNOSIS — Z12.11 SCREEN FOR COLON CANCER: ICD-10-CM

## 2022-03-01 DIAGNOSIS — R74.01 TRANSAMINITIS: ICD-10-CM

## 2022-03-01 DIAGNOSIS — Z00.00 MEDICARE ANNUAL WELLNESS VISIT, INITIAL: Primary | ICD-10-CM

## 2022-03-01 DIAGNOSIS — M85.859 OSTEOPENIA OF NECK OF FEMUR, UNSPECIFIED LATERALITY: ICD-10-CM

## 2022-03-01 PROCEDURE — 1003F LEVEL OF ACTIVITY ASSESS: CPT | Performed by: INTERNAL MEDICINE

## 2022-03-01 PROCEDURE — 3725F SCREEN DEPRESSION PERFORMED: CPT | Performed by: INTERNAL MEDICINE

## 2022-03-01 PROCEDURE — 1090F PRES/ABSN URINE INCON ASSESS: CPT | Performed by: INTERNAL MEDICINE

## 2022-03-01 PROCEDURE — 99214 OFFICE O/P EST MOD 30 MIN: CPT | Performed by: INTERNAL MEDICINE

## 2022-03-01 PROCEDURE — 3008F BODY MASS INDEX DOCD: CPT | Performed by: INTERNAL MEDICINE

## 2022-03-01 PROCEDURE — 1125F AMNT PAIN NOTED PAIN PRSNT: CPT | Performed by: INTERNAL MEDICINE

## 2022-03-01 PROCEDURE — 3288F FALL RISK ASSESSMENT DOCD: CPT | Performed by: INTERNAL MEDICINE

## 2022-03-01 PROCEDURE — 1170F FXNL STATUS ASSESSED: CPT | Performed by: INTERNAL MEDICINE

## 2022-03-01 PROCEDURE — 93000 ELECTROCARDIOGRAM COMPLETE: CPT | Performed by: INTERNAL MEDICINE

## 2022-03-01 PROCEDURE — 1160F RVW MEDS BY RX/DR IN RCRD: CPT | Performed by: INTERNAL MEDICINE

## 2022-03-01 PROCEDURE — G0439 PPPS, SUBSEQ VISIT: HCPCS | Performed by: INTERNAL MEDICINE

## 2022-03-01 NOTE — PROGRESS NOTES
Assessment/Plan:     Quality Measures:       Depression Screening and Follow-up Plan: Patient was screened for depression during today's encounter  They screened negative with a PHQ-2 score of 0  Return in about 6 months (around 9/1/2022)  No problem-specific Assessment & Plan notes found for this encounter  Diagnoses and all orders for this visit:    Medicare annual wellness visit, initial  -     POCT ECG    Osteopenia of neck of femur, unspecified laterality  -     DXA bone density spine hip and pelvis; Future    Screen for colon cancer  -     Cancel: Cologuard  -     Ambulatory referral to Gastroenterology; Future    Post-menopausal  -     DXA bone density spine hip and pelvis; Future    Transaminitis  -     CBC and differential; Future  -     Comprehensive metabolic panel; Future    Screening for HIV without presence of risk factors  -     HIV 1/2 Antigen/Antibody (4th Generation) w Reflex [facility]; Future    Encounter for lipid screening for cardiovascular disease  -     Lipid Panel with Direct LDL reflex; Future          Subjective:      Patient ID: Elias Johnston is a 73 y o  female  Olmary Crenshaw is here for her first medicare well visit which has been completed  EKG done and normal  She is doing well  Last time she was here she was suffering from a viral exanthem  She reports 100 % improvement  We discussed preventive screenings  Cologard done in 2020 and her last colonoscopy was done in 2012  She decided to go for full colonoscopy this time  refusing DEXA scan  Mammogram not due until 9/2022  Refusing COVID vaccine  Will look into PPSV 23         ALLERGIES:  No Known Allergies    CURRENT MEDICATIONS:    Current Outpatient Medications:     Cholecalciferol (VITAMIN D3) 50 MCG (2000 UT) capsule, Take 1 capsule (2,000 Units total) by mouth daily, Disp: 100 capsule, Rfl: 2    multivitamin-minerals (CENTRUM) tablet, Take by mouth , Disp: , Rfl:     ACTIVE PROBLEM LIST:  Patient Active Problem List   Diagnosis    Mild vitamin D deficiency    Encounter for gynecological examination without abnormal finding    Osteopenia of neck of femur    Other hyperlipidemia    Elevated HDL    Elevated LDL cholesterol level    Erythema multiforme    Transaminitis       PAST MEDICAL HISTORY:  Past Medical History:   Diagnosis Date    Arthritis     Vocal cord nodules     removed       PAST SURGICAL HISTORY:  Past Surgical History:   Procedure Laterality Date     SECTION      LARYNGOSCOPY      with stripping of vocal cords       FAMILY HISTORY:  Family History   Problem Relation Age of Onset    Lung cancer Mother     Breast cancer Mother 39    Diabetes type I Mother     Lung cancer Father     Cancer Father     Diabetes type I Father     Diabetes Daughter         mellitus    Diabetes Son         mellitus    Mental illness Son         mental disorder    No Known Problems Sister     No Known Problems Maternal Grandmother     No Known Problems Paternal Grandmother     No Known Problems Daughter        SOCIAL HISTORY:  Social History     Socioeconomic History    Marital status: /Civil Union     Spouse name: Not on file    Number of children: Not on file    Years of education: Not on file    Highest education level: Not on file   Occupational History    Not on file   Tobacco Use    Smoking status: Never Smoker    Smokeless tobacco: Never Used   Vaping Use    Vaping Use: Not on file   Substance and Sexual Activity    Alcohol use:  Yes     Alcohol/week: 2 0 standard drinks     Types: 2 Glasses of wine per week     Comment: weekly    Drug use: No    Sexual activity: Not Currently     Birth control/protection: Post-menopausal   Other Topics Concern    Not on file   Social History Narrative    Always uses seat belt    Daily caffeine consumption, 4-5 servings a day     Social Determinants of Health     Financial Resource Strain: Not on file   Food Insecurity: Not on file Transportation Needs: Not on file   Physical Activity: Not on file   Stress: Not on file   Social Connections: Not on file   Intimate Partner Violence: Not on file   Housing Stability: Not on file       Review of Systems   All other systems reviewed and are negative  Objective:  Vitals:    03/01/22 1053   BP: 128/86   BP Location: Left arm   Patient Position: Sitting   Cuff Size: Adult   Pulse: 74   Temp: 98 2 °F (36 8 °C)   TempSrc: Tympanic   SpO2: 98%   Weight: 60 kg (132 lb 3 2 oz)   Height: 5' 2" (1 575 m)     Body mass index is 24 18 kg/m²  Physical Exam  Vitals and nursing note reviewed  Constitutional:       Appearance: Normal appearance  She is normal weight  HENT:      Head: Normocephalic and atraumatic  Nose: Nose normal       Mouth/Throat:      Mouth: Mucous membranes are moist    Cardiovascular:      Rate and Rhythm: Normal rate and regular rhythm  Heart sounds: Normal heart sounds  Pulmonary:      Effort: Pulmonary effort is normal       Breath sounds: Normal breath sounds  Abdominal:      Palpations: Abdomen is soft  Musculoskeletal:         General: Normal range of motion  Cervical back: Normal range of motion  Skin:     General: Skin is warm and dry  Neurological:      General: No focal deficit present  Mental Status: She is alert and oriented to person, place, and time  Mental status is at baseline  Psychiatric:         Mood and Affect: Mood normal            RESULTS:    No results found for this or any previous visit (from the past 1008 hour(s))  This note was created with voice recognition software  Phonic, grammatical and spelling errors may be present within the note as a result

## 2022-03-01 NOTE — PROGRESS NOTES
Assessment and Plan:     Problem List Items Addressed This Visit        Musculoskeletal and Integument    Osteopenia of neck of femur - Primary    Relevant Orders    DXA bone density spine hip and pelvis       Other    Transaminitis    Relevant Orders    CBC and differential    Comprehensive metabolic panel      Other Visit Diagnoses     Screen for colon cancer        Relevant Orders    Ambulatory referral to Gastroenterology    Medicare annual wellness visit, initial        Relevant Orders    POCT ECG (Completed)    Post-menopausal        Relevant Orders    DXA bone density spine hip and pelvis    Screening for HIV without presence of risk factors        Relevant Orders    HIV 1/2 Antigen/Antibody (4th Generation) w Reflex [facility]    Encounter for lipid screening for cardiovascular disease        Relevant Orders    Lipid Panel with Direct LDL reflex           Preventive health issues were discussed with patient, and age appropriate screening tests were ordered as noted in patient's After Visit Summary  Personalized health advice and appropriate referrals for health education or preventive services given if needed, as noted in patient's After Visit Summary  History of Present Illness:     Patient presents for Welcome to Medicare visit       Patient Care Team:  Lela Bojorquez MD as PCP - General (Internal Medicine)  MARIA TERESA Perry     Review of Systems:     Review of Systems   Problem List:     Patient Active Problem List   Diagnosis    Mild vitamin D deficiency    Encounter for gynecological examination without abnormal finding    Osteopenia of neck of femur    Other hyperlipidemia    Elevated HDL    Elevated LDL cholesterol level    Erythema multiforme    Transaminitis      Past Medical and Surgical History:     Past Medical History:   Diagnosis Date    Arthritis     Vocal cord nodules     removed     Past Surgical History:   Procedure Laterality Date     SECTION      LARYNGOSCOPY with stripping of vocal cords      Family History:     Family History   Problem Relation Age of Onset    Lung cancer Mother     Breast cancer Mother 39    Diabetes type I Mother     Lung cancer Father     Cancer Father     Diabetes type I Father     Diabetes Daughter         mellitus    Diabetes Son         mellitus    Mental illness Son         mental disorder    No Known Problems Sister     No Known Problems Maternal Grandmother     No Known Problems Paternal Grandmother     No Known Problems Daughter       Social History:     Social History     Socioeconomic History    Marital status: /Civil Union     Spouse name: None    Number of children: None    Years of education: None    Highest education level: None   Occupational History    None   Tobacco Use    Smoking status: Never Smoker    Smokeless tobacco: Never Used   Vaping Use    Vaping Use: None   Substance and Sexual Activity    Alcohol use: Yes     Alcohol/week: 2 0 standard drinks     Types: 2 Glasses of wine per week     Comment: weekly    Drug use: No    Sexual activity: Not Currently     Birth control/protection: Post-menopausal   Other Topics Concern    None   Social History Narrative    Always uses seat belt    Daily caffeine consumption, 4-5 servings a day     Social Determinants of Health     Financial Resource Strain: Not on file   Food Insecurity: Not on file   Transportation Needs: Not on file   Physical Activity: Not on file   Stress: Not on file   Social Connections: Not on file   Intimate Partner Violence: Not on file   Housing Stability: Not on file      Medications and Allergies:     Current Outpatient Medications   Medication Sig Dispense Refill    Cholecalciferol (VITAMIN D3) 50 MCG (2000 UT) capsule Take 1 capsule (2,000 Units total) by mouth daily 100 capsule 2    multivitamin-minerals (CENTRUM) tablet Take by mouth        No current facility-administered medications for this visit       No Known Allergies Immunizations:     Immunization History   Administered Date(s) Administered    Tdap 08/03/2018      Health Maintenance:         Topic Date Due    HIV Screening  Never done    Colorectal Cancer Screening  04/29/2020    Cervical Cancer Screening  06/11/2022    Breast Cancer Screening: Mammogram  09/14/2022    Hepatitis C Screening  Completed         Topic Date Due    COVID-19 Vaccine (1) Never done    Pneumococcal Vaccine: 65+ Years (1 of 1 - PPSV23) Never done    Influenza Vaccine (1) Never done      Medicare Screening Tests and Risk Assessments:     Cari Burgess is here for her Welcome to Medicare visit  Health Risk Assessment:   Patient rates overall health as very good  Patient feels that their physical health rating is same  Patient is satisfied with their life  Eyesight was rated as slightly worse  Hearing was rated as same  Patient feels that their emotional and mental health rating is same  Patients states they are never, rarely angry  Patient states they are never, rarely unusually tired/fatigued  Pain experienced in the last 7 days has been none  Patient states that she has experienced no weight loss or gain in last 6 months  Depression Screening:   PHQ-2 Score: 0      Fall Risk Screening: In the past year, patient has experienced: no history of falling in past year      Urinary Incontinence Screening:   Patient has not leaked urine accidently in the last six months  Home Safety:  Patient does not have trouble with stairs inside or outside of their home  Patient has working smoke alarms and has working carbon monoxide detector  Home safety hazards include: none  Nutrition:   Mediterranean Diet    Medications:   Patient is currently taking over-the-counter supplements  OTC medications include: see medication list  Patient is able to manage medications       Activities of Daily Living (ADLs)/Instrumental Activities of Daily Living (IADLs):   Walk and transfer into and out of bed and chair?: Yes  Dress and groom yourself?: Yes    Bathe or shower yourself?: Yes    Feed yourself? Yes  Do your laundry/housekeeping?: Yes  Manage your money, pay your bills and track your expenses?: Yes  Make your own meals?: Yes    Do your own shopping?: Yes    Previous Hospitalizations:   Any hospitalizations or ED visits within the last 12 months?: Yes    How many hospitalizations have you had in the last year?: 1-2    Advance Care Planning:   Living will: No    Durable POA for healthcare: No    Advanced directive: No      PREVENTIVE SCREENINGS      Cardiovascular Screening:    General: History Lipid Disorder and Screening Current    Due for: Lipid Panel      Diabetes Screening:     General: Screening Current      Colorectal Cancer Screening:     General: Screening Current      Breast Cancer Screening:     General: Screening Current      Cervical Cancer Screening:    General: Screening Not Indicated    Due for: Cervical Pap Smear      Osteoporosis Screening:    General: Patient Declines      Abdominal Aortic Aneurysm (AAA) Screening:        General: Screening Not Indicated      Lung Cancer Screening:     General: Screening Not Indicated      Hepatitis C Screening:    General: Screening Current    Screening, Brief Intervention, and Referral to Treatment (SBIRT)    Screening  Typical number of drinks in a day: 0  Typical number of drinks in a week: 2  Interpretation: Low risk drinking behavior  Single Item Drug Screening:  How often have you used an illegal drug (including marijuana) or a prescription medication for non-medical reasons in the past year? never    Single Item Drug Screen Score: 0  Interpretation: Negative screen for possible drug use disorder    Brief Intervention  Alcohol & drug use screenings were reviewed  No concerns regarding substance use disorder identified       Other Counseling Topics:   Car/seat belt/driving safety, skin self-exam, sunscreen and calcium and vitamin D intake and regular weightbearing exercise        Visual Acuity Screening    Right eye Left eye Both eyes   Without correction:      With correction: 20/50 20/40 20/30        Physical Exam:     /86 (BP Location: Left arm, Patient Position: Sitting, Cuff Size: Adult)   Pulse 74   Temp 98 2 °F (36 8 °C) (Tympanic)   Ht 5' 2" (1 575 m)   Wt 60 kg (132 lb 3 2 oz)   LMP  (LMP Unknown)   SpO2 98%   BMI 24 18 kg/m²         Julia Pichardo MD

## 2022-03-01 NOTE — PATIENT INSTRUCTIONS
Medicare Preventive Visit Patient Instructions  Thank you for completing your Welcome to Medicare Visit or Medicare Annual Wellness Visit today  Your next wellness visit will be due in one year (3/2/2023)  The screening/preventive services that you may require over the next 5-10 years are detailed below  Some tests may not apply to you based off risk factors and/or age  Screening tests ordered at today's visit but not completed yet may show as past due  Also, please note that scanned in results may not display below  Preventive Screenings:  Service Recommendations Previous Testing/Comments   Colorectal Cancer Screening  * Colonoscopy    * Fecal Occult Blood Test (FOBT)/Fecal Immunochemical Test (FIT)  * Fecal DNA/Cologuard Test  * Flexible Sigmoidoscopy Age: 54-65 years old   Colonoscopy: every 10 years (may be performed more frequently if at higher risk)  OR  FOBT/FIT: every 1 year  OR  Cologuard: every 3 years  OR  Sigmoidoscopy: every 5 years  Screening may be recommended earlier than age 48 if at higher risk for colorectal cancer  Also, an individualized decision between you and your healthcare provider will decide whether screening between the ages of 74-80 would be appropriate  Colonoscopy: 03/21/2012  FOBT/FIT: Not on file  Cologuard: 04/28/2020  Sigmoidoscopy: Not on file    Screening Current     Breast Cancer Screening Age: 36 years old  Frequency: every 1-2 years  Not required if history of left and right mastectomy Mammogram: 09/14/2021    Screening Current   Cervical Cancer Screening Between the ages of 21-29, pap smear recommended once every 3 years  Between the ages of 33-67, can perform pap smear with HPV co-testing every 5 years     Recommendations may differ for women with a history of total hysterectomy, cervical cancer, or abnormal pap smears in past  Pap Smear: 06/11/2019    Screening Not Indicated   Hepatitis C Screening Once for adults born between 1945 and 1965  More frequently in patients at high risk for Hepatitis C Hep C Antibody: 08/12/2021    Screening Current   Diabetes Screening 1-2 times per year if you're at risk for diabetes or have pre-diabetes Fasting glucose: 89 mg/dL   A1C: No results in last 5 years    Screening Current   Cholesterol Screening Once every 5 years if you don't have a lipid disorder  May order more often based on risk factors  Lipid panel: 08/11/2021    Screening Not Indicated  History Lipid Disorder     Other Preventive Screenings Covered by Medicare:  1  Abdominal Aortic Aneurysm (AAA) Screening: covered once if your at risk  You're considered to be at risk if you have a family history of AAA  2  Lung Cancer Screening: covers low dose CT scan once per year if you meet all of the following conditions: (1) Age 50-69; (2) No signs or symptoms of lung cancer; (3) Current smoker or have quit smoking within the last 15 years; (4) You have a tobacco smoking history of at least 30 pack years (packs per day multiplied by number of years you smoked); (5) You get a written order from a healthcare provider  3  Glaucoma Screening: covered annually if you're considered high risk: (1) You have diabetes OR (2) Family history of glaucoma OR (3)  aged 48 and older OR (3)  American aged 72 and older  3  Osteoporosis Screening: covered every 2 years if you meet one of the following conditions: (1) You're estrogen deficient and at risk for osteoporosis based off medical history and other findings; (2) Have a vertebral abnormality; (3) On glucocorticoid therapy for more than 3 months; (4) Have primary hyperparathyroidism; (5) On osteoporosis medications and need to assess response to drug therapy  · Last bone density test (DXA Scan): 07/16/2018  5  HIV Screening: covered annually if you're between the age of 12-76  Also covered annually if you are younger than 13 and older than 72 with risk factors for HIV infection   For pregnant patients, it is covered up to 3 times per pregnancy  Immunizations:  Immunization Recommendations   Influenza Vaccine Annual influenza vaccination during flu season is recommended for all persons aged >= 6 months who do not have contraindications   Pneumococcal Vaccine (Prevnar and Pneumovax)  * Prevnar = PCV13  * Pneumovax = PPSV23   Adults 25-60 years old: 1-3 doses may be recommended based on certain risk factors  Adults 72 years old: Prevnar (PCV13) vaccine recommended followed by Pneumovax (PPSV23) vaccine  If already received PPSV23 since turning 65, then PCV13 recommended at least one year after PPSV23 dose  Hepatitis B Vaccine 3 dose series if at intermediate or high risk (ex: diabetes, end stage renal disease, liver disease)   Tetanus (Td) Vaccine - COST NOT COVERED BY MEDICARE PART B Following completion of primary series, a booster dose should be given every 10 years to maintain immunity against tetanus  Td may also be given as tetanus wound prophylaxis  Tdap Vaccine - COST NOT COVERED BY MEDICARE PART B Recommended at least once for all adults  For pregnant patients, recommended with each pregnancy  Shingles Vaccine (Shingrix) - COST NOT COVERED BY MEDICARE PART B  2 shot series recommended in those aged 48 and above     Health Maintenance Due:      Topic Date Due    HIV Screening  Never done    Colorectal Cancer Screening  04/29/2020    Cervical Cancer Screening  06/11/2022    Breast Cancer Screening: Mammogram  09/14/2022    Hepatitis C Screening  Completed     Immunizations Due:      Topic Date Due    COVID-19 Vaccine (1) Never done    Pneumococcal Vaccine: 65+ Years (1 of 1 - PPSV23) Never done    Influenza Vaccine (1) Never done     Advance Directives   What are advance directives? Advance directives are legal documents that state your wishes and plans for medical care  These plans are made ahead of time in case you lose your ability to make decisions for yourself   Advance directives can apply to any medical decision, such as the treatments you want, and if you want to donate organs  What are the types of advance directives? There are many types of advance directives, and each state has rules about how to use them  You may choose a combination of any of the following:  · Living will: This is a written record of the treatment you want  You can also choose which treatments you do not want, which to limit, and which to stop at a certain time  This includes surgery, medicine, IV fluid, and tube feedings  · Durable power of  for healthcare [facility]): This is a written record that states who you want to make healthcare choices for you when you are unable to make them for yourself  This person, called a proxy, is usually a family member or a friend  You may choose more than 1 proxy  · Do not resuscitate (DNR) order:  A DNR order is used in case your heart stops beating or you stop breathing  It is a request not to have certain forms of treatment, such as CPR  A DNR order may be included in other types of advance directives  · Medical directive: This covers the care that you want if you are in a coma, near death, or unable to make decisions for yourself  You can list the treatments you want for each condition  Treatment may include pain medicine, surgery, blood transfusions, dialysis, IV or tube feedings, and a ventilator (breathing machine)  · Values history: This document has questions about your views, beliefs, and how you feel and think about life  This information can help others choose the care that you would choose  Why are advance directives important? An advance directive helps you control your care  Although spoken wishes may be used, it is better to have your wishes written down  Spoken wishes can be misunderstood, or not followed  Treatments may be given even if you do not want them  An advance directive may make it easier for your family to make difficult choices about your care        © Copyright [employer] 2018 Information is for [employer] use only and may not be sold, redistributed or otherwise used for commercial purposes   All illustrations and images included in CareNotes® are the copyrighted property of A D A M , Inc  or [facility] Genesis Osborne

## 2022-03-10 ENCOUNTER — APPOINTMENT (OUTPATIENT)
Dept: LAB | Facility: HOSPITAL | Age: 67
End: 2022-03-10
Payer: COMMERCIAL

## 2022-03-10 DIAGNOSIS — Z13.220 ENCOUNTER FOR LIPID SCREENING FOR CARDIOVASCULAR DISEASE: ICD-10-CM

## 2022-03-10 DIAGNOSIS — R79.89 ELEVATED LFTS: ICD-10-CM

## 2022-03-10 DIAGNOSIS — Z13.6 ENCOUNTER FOR LIPID SCREENING FOR CARDIOVASCULAR DISEASE: ICD-10-CM

## 2022-03-10 DIAGNOSIS — Z11.4 SCREENING FOR HIV WITHOUT PRESENCE OF RISK FACTORS: ICD-10-CM

## 2022-03-10 DIAGNOSIS — R53.82 CHRONIC FATIGUE, UNSPECIFIED: ICD-10-CM

## 2022-03-10 DIAGNOSIS — R74.01 TRANSAMINITIS: ICD-10-CM

## 2022-03-10 LAB
ALBUMIN SERPL BCP-MCNC: 3.6 G/DL (ref 3.5–5)
ALP SERPL-CCNC: 178 U/L (ref 46–116)
ALT SERPL W P-5'-P-CCNC: 30 U/L (ref 12–78)
ANION GAP SERPL CALCULATED.3IONS-SCNC: 8 MMOL/L (ref 4–13)
AST SERPL W P-5'-P-CCNC: 18 U/L (ref 5–45)
BASOPHILS # BLD AUTO: 0.04 THOUSANDS/ΜL (ref 0–0.1)
BASOPHILS NFR BLD AUTO: 1 % (ref 0–1)
BILIRUB SERPL-MCNC: 0.53 MG/DL (ref 0.2–1)
BUN SERPL-MCNC: 14 MG/DL (ref 5–25)
CALCIUM SERPL-MCNC: 8.7 MG/DL (ref 8.3–10.1)
CHLORIDE SERPL-SCNC: 103 MMOL/L (ref 100–108)
CHOLEST SERPL-MCNC: 253 MG/DL
CO2 SERPL-SCNC: 27 MMOL/L (ref 21–32)
CREAT SERPL-MCNC: 0.67 MG/DL (ref 0.6–1.3)
EOSINOPHIL # BLD AUTO: 0.06 THOUSAND/ΜL (ref 0–0.61)
EOSINOPHIL NFR BLD AUTO: 1 % (ref 0–6)
ERYTHROCYTE [DISTWIDTH] IN BLOOD BY AUTOMATED COUNT: 14 % (ref 11.6–15.1)
GFR SERPL CREATININE-BSD FRML MDRD: 92 ML/MIN/1.73SQ M
GLUCOSE P FAST SERPL-MCNC: 94 MG/DL (ref 65–99)
HAV IGM SER QL: NORMAL
HBV CORE IGM SER QL: NORMAL
HBV SURFACE AG SER QL: NORMAL
HCT VFR BLD AUTO: 36.6 % (ref 34.8–46.1)
HCV AB SER QL: NORMAL
HDLC SERPL-MCNC: 95 MG/DL
HGB BLD-MCNC: 12.4 G/DL (ref 11.5–15.4)
IMM GRANULOCYTES # BLD AUTO: 0.02 THOUSAND/UL (ref 0–0.2)
IMM GRANULOCYTES NFR BLD AUTO: 0 % (ref 0–2)
LDLC SERPL CALC-MCNC: 149 MG/DL (ref 0–100)
LYMPHOCYTES # BLD AUTO: 2.49 THOUSANDS/ΜL (ref 0.6–4.47)
LYMPHOCYTES NFR BLD AUTO: 31 % (ref 14–44)
MCH RBC QN AUTO: 30.4 PG (ref 26.8–34.3)
MCHC RBC AUTO-ENTMCNC: 33.9 G/DL (ref 31.4–37.4)
MCV RBC AUTO: 90 FL (ref 82–98)
MONOCYTES # BLD AUTO: 0.57 THOUSAND/ΜL (ref 0.17–1.22)
MONOCYTES NFR BLD AUTO: 7 % (ref 4–12)
NEUTROPHILS # BLD AUTO: 4.79 THOUSANDS/ΜL (ref 1.85–7.62)
NEUTS SEG NFR BLD AUTO: 60 % (ref 43–75)
NRBC BLD AUTO-RTO: 0 /100 WBCS
PLATELET # BLD AUTO: 247 THOUSANDS/UL (ref 149–390)
PMV BLD AUTO: 10.2 FL (ref 8.9–12.7)
POTASSIUM SERPL-SCNC: 4.7 MMOL/L (ref 3.5–5.3)
PROT SERPL-MCNC: 7.2 G/DL (ref 6.4–8.2)
RBC # BLD AUTO: 4.08 MILLION/UL (ref 3.81–5.12)
SODIUM SERPL-SCNC: 138 MMOL/L (ref 136–145)
TRIGL SERPL-MCNC: 47 MG/DL
WBC # BLD AUTO: 7.97 THOUSAND/UL (ref 4.31–10.16)

## 2022-03-10 PROCEDURE — 80061 LIPID PANEL: CPT

## 2022-03-10 PROCEDURE — 87389 HIV-1 AG W/HIV-1&-2 AB AG IA: CPT

## 2022-03-10 PROCEDURE — 82977 ASSAY OF GGT: CPT

## 2022-03-10 PROCEDURE — 80053 COMPREHEN METABOLIC PANEL: CPT

## 2022-03-10 PROCEDURE — 36415 COLL VENOUS BLD VENIPUNCTURE: CPT

## 2022-03-10 PROCEDURE — 80074 ACUTE HEPATITIS PANEL: CPT

## 2022-03-10 PROCEDURE — 85025 COMPLETE CBC W/AUTO DIFF WBC: CPT

## 2022-03-11 LAB
GGT SERPL-CCNC: 12 U/L (ref 5–85)
HCV AB S/CO SERPL IA: 0.2 S/CO RATIO (ref 0–0.9)
HIV 1+2 AB+HIV1 P24 AG SERPL QL IA: NORMAL
SL AMB INTERPRETATION: NORMAL

## 2022-03-22 ENCOUNTER — HOSPITAL ENCOUNTER (OUTPATIENT)
Dept: ULTRASOUND IMAGING | Facility: HOSPITAL | Age: 67
Discharge: HOME | End: 2022-03-22
Attending: INTERNAL MEDICINE
Payer: COMMERCIAL

## 2022-03-22 DIAGNOSIS — R79.89 ELEVATED LFTS: ICD-10-CM

## 2022-03-22 PROCEDURE — 76705 ECHO EXAM OF ABDOMEN: CPT

## 2022-08-31 ENCOUNTER — RA CDI HCC (OUTPATIENT)
Dept: OTHER | Facility: HOSPITAL | Age: 67
End: 2022-08-31

## 2022-08-31 NOTE — PROGRESS NOTES
[facility]  coding opportunities       Chart reviewed, no opportunity found:   [address]        Patients Insurance     Medicare Insurance: [employer] Advantage

## 2022-09-06 ENCOUNTER — OFFICE VISIT (OUTPATIENT)
Dept: INTERNAL MEDICINE CLINIC | Facility: CLINIC | Age: 67
End: 2022-09-06
Payer: COMMERCIAL

## 2022-09-06 ENCOUNTER — TELEPHONE (OUTPATIENT)
Dept: ADMINISTRATIVE | Facility: OTHER | Age: 67
End: 2022-09-06

## 2022-09-06 ENCOUNTER — APPOINTMENT (OUTPATIENT)
Dept: LAB | Facility: HOSPITAL | Age: 67
End: 2022-09-06
Payer: COMMERCIAL

## 2022-09-06 VITALS
HEIGHT: 62 IN | HEART RATE: 73 BPM | OXYGEN SATURATION: 99 % | DIASTOLIC BLOOD PRESSURE: 84 MMHG | RESPIRATION RATE: 12 BRPM | WEIGHT: 136 LBS | BODY MASS INDEX: 25.03 KG/M2 | SYSTOLIC BLOOD PRESSURE: 140 MMHG

## 2022-09-06 DIAGNOSIS — E78.49 OTHER HYPERLIPIDEMIA: ICD-10-CM

## 2022-09-06 DIAGNOSIS — Z12.11 COLON CANCER SCREENING: ICD-10-CM

## 2022-09-06 DIAGNOSIS — R74.01 TRANSAMINITIS: ICD-10-CM

## 2022-09-06 DIAGNOSIS — Z12.11 SCREEN FOR COLON CANCER: Primary | ICD-10-CM

## 2022-09-06 DIAGNOSIS — F41.9 ANXIETY DISORDER, UNSPECIFIED TYPE: ICD-10-CM

## 2022-09-06 LAB
ALBUMIN SERPL BCP-MCNC: 3.6 G/DL (ref 3.5–5)
ALP SERPL-CCNC: 58 U/L (ref 46–116)
ALT SERPL W P-5'-P-CCNC: 24 U/L (ref 12–78)
ANION GAP SERPL CALCULATED.3IONS-SCNC: 10 MMOL/L (ref 4–13)
AST SERPL W P-5'-P-CCNC: 16 U/L (ref 5–45)
BILIRUB SERPL-MCNC: 0.37 MG/DL (ref 0.2–1)
BUN SERPL-MCNC: 16 MG/DL (ref 5–25)
CALCIUM SERPL-MCNC: 8.9 MG/DL (ref 8.3–10.1)
CHLORIDE SERPL-SCNC: 104 MMOL/L (ref 96–108)
CO2 SERPL-SCNC: 28 MMOL/L (ref 21–32)
CREAT SERPL-MCNC: 0.67 MG/DL (ref 0.6–1.3)
GFR SERPL CREATININE-BSD FRML MDRD: 91 ML/MIN/1.73SQ M
GLUCOSE P FAST SERPL-MCNC: 89 MG/DL (ref 65–99)
POTASSIUM SERPL-SCNC: 4.1 MMOL/L (ref 3.5–5.3)
PROT SERPL-MCNC: 7.6 G/DL (ref 6.4–8.4)
SODIUM SERPL-SCNC: 142 MMOL/L (ref 135–147)

## 2022-09-06 PROCEDURE — 36415 COLL VENOUS BLD VENIPUNCTURE: CPT

## 2022-09-06 PROCEDURE — 3725F SCREEN DEPRESSION PERFORMED: CPT | Performed by: INTERNAL MEDICINE

## 2022-09-06 PROCEDURE — 80053 COMPREHEN METABOLIC PANEL: CPT

## 2022-09-06 PROCEDURE — 1160F RVW MEDS BY RX/DR IN RCRD: CPT | Performed by: INTERNAL MEDICINE

## 2022-09-06 PROCEDURE — 99214 OFFICE O/P EST MOD 30 MIN: CPT | Performed by: INTERNAL MEDICINE

## 2022-09-06 NOTE — TELEPHONE ENCOUNTER
----- Message from Chito Goodman sent at 9/2/2022 12:50 PM EDT -----  Regarding: Bryon  09/02/22 12:50 PM    Hello, our patient Jaimie Beasley has had CRC: Bryon completed/performed  Please assist in updating the patient chart by pulling the Care Everywhere (CE) document  The date of service is 4/28/2020       Thank you,  Chito PAYAN [facility]

## 2022-09-06 NOTE — TELEPHONE ENCOUNTER
Upon review of the In Basket request we reviewed the Labs Tab in Chart Review and discovered that the Cologard DOS 4- was resulted by a differnet dept (Central Scanning) on 5-7-2020  This is present in  but the 'Address Topic' section for CRC frequency was not updated at the time of resulting  Any additional questions or concerns should be emailed to the Practice Liaisons via [email or web address] email, please do not reply via In Basket      Thank you  Con Matamoros MA

## 2022-09-06 NOTE — PROGRESS NOTES
Assessment/Plan:     Real Reasons is here for routine follow up  She reports doing well  She is due for colonoscopy  Order placed today  She is due for Pap smear  She will call her gyn  She reports some anxiety related to her son's treatment  For his mental illness  Will recheck her liver function  Her blood pressure is elevated  She will keep an eye on it and call me with readings  Otherwise she looks great  Quality Measures:   Depression Screening and Follow-up Plan: Clincally patient does not have depression  No treatment is required  Return in about 6 months (around 3/6/2023) for regular and medicare  No problem-specific Assessment & Plan notes found for this encounter  Diagnoses and all orders for this visit:    Screen for colon cancer    Anxiety disorder, unspecified type     Other hyperlipidemia  -     CBC and differential; Future  -     Comprehensive metabolic panel; Future  -     Lipid Panel with Direct LDL reflex; Future    Colon cancer screening  -     Ambulatory referral for colonoscopy; Future    Transaminitis  -     Comprehensive metabolic panel; Future          Subjective:      Patient ID: Janice Gallegos is a 78 y o  female  Here for routine follow up        ALLERGIES:  No Known Allergies    CURRENT MEDICATIONS:    Current Outpatient Medications:     Cholecalciferol (VITAMIN D3) 50 MCG (2000 UT) capsule, Take 1 capsule (2,000 Units total) by mouth daily, Disp: 100 capsule, Rfl: 2    multivitamin-minerals (CENTRUM) tablet, Take by mouth , Disp: , Rfl:     ACTIVE PROBLEM LIST:  Patient Active Problem List   Diagnosis    Mild vitamin D deficiency    Encounter for gynecological examination without abnormal finding    Osteopenia of neck of femur    Other hyperlipidemia    Elevated HDL    Elevated LDL cholesterol level    Erythema multiforme    Transaminitis       PAST MEDICAL HISTORY:  Past Medical History:   Diagnosis Date    Arthritis     Vocal cord nodules removed       PAST SURGICAL HISTORY:  Past Surgical History:   Procedure Laterality Date     SECTION      LARYNGOSCOPY      with stripping of vocal cords       FAMILY HISTORY:  Family History   Problem Relation Age of Onset    Lung cancer Mother     Breast cancer Mother 39    Diabetes type I Mother     Lung cancer Father     Cancer Father     Diabetes type I Father     Diabetes Daughter         mellitus    Diabetes Son         mellitus    Mental illness Son         mental disorder    No Known Problems Sister     No Known Problems Maternal Grandmother     No Known Problems Paternal Grandmother     No Known Problems Daughter        SOCIAL HISTORY:  Social History     Socioeconomic History    Marital status: /Civil Union     Spouse name: Not on file    Number of children: Not on file    Years of education: Not on file    Highest education level: Not on file   Occupational History    Not on file   Tobacco Use    Smoking status: Never Smoker    Smokeless tobacco: Never Used   Vaping Use    Vaping Use: Never used   Substance and Sexual Activity    Alcohol use: Yes     Alcohol/week: 2 0 standard drinks     Types: 2 Glasses of wine per week     Comment: weekly    Drug use: No    Sexual activity: Not Currently     Birth control/protection: Post-menopausal   Other Topics Concern    Not on file   Social History Narrative    Always uses seat belt    Daily caffeine consumption, 4-5 servings a day     Social Determinants of Health     Financial Resource Strain: Not on file   Food Insecurity: Not on file   Transportation Needs: Not on file   Physical Activity: Not on file   Stress: Not on file   Social Connections: Not on file   Intimate Partner Violence: Not on file   Housing Stability: Not on file       Review of Systems   Psychiatric/Behavioral: Positive for dysphoric mood  The patient is nervous/anxious  All other systems reviewed and are negative          Objective:  Vitals: 09/06/22 1018   BP: 140/84   BP Location: Left arm   Patient Position: Sitting   Pulse: 73   Resp: 12   SpO2: 99%   Weight: 61 7 kg (136 lb)   Height: 5' 2" (1 575 m)     Body mass index is 24 87 kg/m²  Physical Exam  Vitals and nursing note reviewed  Constitutional:       Appearance: Normal appearance  HENT:      Head: Normocephalic and atraumatic  Cardiovascular:      Rate and Rhythm: Normal rate and regular rhythm  Heart sounds: Normal heart sounds  Pulmonary:      Effort: Pulmonary effort is normal       Breath sounds: Normal breath sounds  Musculoskeletal:         General: Normal range of motion  Cervical back: Normal range of motion  Lymphadenopathy:      Cervical: No cervical adenopathy  Skin:     General: Skin is warm and dry  Neurological:      General: No focal deficit present  Mental Status: She is alert and oriented to person, place, and time  Mental status is at baseline  Psychiatric:         Mood and Affect: Mood normal            RESULTS:    No results found for this or any previous visit (from the past 1008 hour(s))  This note was created with voice recognition software  Phonic, grammatical and spelling errors may be present within the note as a result

## 2023-03-01 ENCOUNTER — RA CDI HCC (OUTPATIENT)
Dept: OTHER | Facility: HOSPITAL | Age: 68
End: 2023-03-01

## 2023-03-07 ENCOUNTER — OFFICE VISIT (OUTPATIENT)
Dept: INTERNAL MEDICINE CLINIC | Facility: CLINIC | Age: 68
End: 2023-03-07

## 2023-03-07 VITALS
RESPIRATION RATE: 16 BRPM | WEIGHT: 144.6 LBS | HEIGHT: 62 IN | SYSTOLIC BLOOD PRESSURE: 144 MMHG | BODY MASS INDEX: 26.61 KG/M2 | DIASTOLIC BLOOD PRESSURE: 86 MMHG | HEART RATE: 84 BPM | OXYGEN SATURATION: 98 %

## 2023-03-07 DIAGNOSIS — Z00.00 MEDICARE ANNUAL WELLNESS VISIT, SUBSEQUENT: Primary | ICD-10-CM

## 2023-03-07 DIAGNOSIS — I10 PRIMARY HYPERTENSION: ICD-10-CM

## 2023-03-07 DIAGNOSIS — E78.49 OTHER HYPERLIPIDEMIA: ICD-10-CM

## 2023-03-07 DIAGNOSIS — Z12.31 SCREENING MAMMOGRAM FOR BREAST CANCER: ICD-10-CM

## 2023-03-07 DIAGNOSIS — M85.859 OSTEOPENIA OF NECK OF FEMUR, UNSPECIFIED LATERALITY: ICD-10-CM

## 2023-03-07 DIAGNOSIS — Z78.0 POST-MENOPAUSAL: ICD-10-CM

## 2023-03-07 PROBLEM — R74.01 TRANSAMINITIS: Status: RESOLVED | Noted: 2021-08-11 | Resolved: 2023-03-07

## 2023-03-07 PROBLEM — L51.9 ERYTHEMA MULTIFORME: Status: RESOLVED | Noted: 2021-08-11 | Resolved: 2023-03-07

## 2023-03-07 NOTE — PATIENT INSTRUCTIONS
Medicare Preventive Visit Patient Instructions  Thank you for completing your Welcome to Medicare Visit or Medicare Annual Wellness Visit today  Your next wellness visit will be due in one year (3/7/2024)  The screening/preventive services that you may require over the next 5-10 years are detailed below  Some tests may not apply to you based off risk factors and/or age  Screening tests ordered at today's visit but not completed yet may show as past due  Also, please note that scanned in results may not display below  Preventive Screenings:  Service Recommendations Previous Testing/Comments   Colorectal Cancer Screening  * Colonoscopy    * Fecal Occult Blood Test (FOBT)/Fecal Immunochemical Test (FIT)  * Fecal DNA/Cologuard Test  * Flexible Sigmoidoscopy Age: 39-70 years old   Colonoscopy: every 10 years (may be performed more frequently if at higher risk)  OR  FOBT/FIT: every 1 year  OR  Cologuard: every 3 years  OR  Sigmoidoscopy: every 5 years  Screening may be recommended earlier than age 39 if at higher risk for colorectal cancer  Also, an individualized decision between you and your healthcare provider will decide whether screening between the ages of 74-80 would be appropriate  Colonoscopy: 04/28/2020  FOBT/FIT: Not on file  Cologuard: 04/28/2020  Sigmoidoscopy: Not on file    Screening Current     Breast Cancer Screening Age: 36 years old  Frequency: every 1-2 years  Not required if history of left and right mastectomy Mammogram: 09/14/2021    Screening Current   Cervical Cancer Screening Between the ages of 21-29, pap smear recommended once every 3 years  Between the ages of 33-67, can perform pap smear with HPV co-testing every 5 years     Recommendations may differ for women with a history of total hysterectomy, cervical cancer, or abnormal pap smears in past  Pap Smear: 06/11/2019    Screening Not Indicated   Hepatitis C Screening Once for adults born between 1945 and 1965  More frequently in patients at high risk for Hepatitis C Hep C Antibody: 03/10/2022    Screening Current   Diabetes Screening 1-2 times per year if you're at risk for diabetes or have pre-diabetes Fasting glucose: 89 mg/dL (9/6/2022)  A1C: No results in last 5 years (No results in last 5 years)  Screening Current   Cholesterol Screening Once every 5 years if you don't have a lipid disorder  May order more often based on risk factors  Lipid panel: 03/10/2022    Screening Not Indicated  History Lipid Disorder     Other Preventive Screenings Covered by Medicare:  1  Abdominal Aortic Aneurysm (AAA) Screening: covered once if your at risk  You're considered to be at risk if you have a family history of AAA  2  Lung Cancer Screening: covers low dose CT scan once per year if you meet all of the following conditions: (1) Age 50-69; (2) No signs or symptoms of lung cancer; (3) Current smoker or have quit smoking within the last 15 years; (4) You have a tobacco smoking history of at least 20 pack years (packs per day multiplied by number of years you smoked); (5) You get a written order from a healthcare provider  3  Glaucoma Screening: covered annually if you're considered high risk: (1) You have diabetes OR (2) Family history of glaucoma OR (3)  aged 48 and older OR (3)  American aged 72 and older  3  Osteoporosis Screening: covered every 2 years if you meet one of the following conditions: (1) You're estrogen deficient and at risk for osteoporosis based off medical history and other findings; (2) Have a vertebral abnormality; (3) On glucocorticoid therapy for more than 3 months; (4) Have primary hyperparathyroidism; (5) On osteoporosis medications and need to assess response to drug therapy  · Last bone density test (DXA Scan): 07/16/2018  5  HIV Screening: covered annually if you're between the age of 12-76  Also covered annually if you are younger than 13 and older than 72 with risk factors for HIV infection  For pregnant patients, it is covered up to 3 times per pregnancy  Immunizations:  Immunization Recommendations   Influenza Vaccine Annual influenza vaccination during flu season is recommended for all persons aged >= 6 months who do not have contraindications   Pneumococcal Vaccine   * Pneumococcal conjugate vaccine = PCV13 (Prevnar 13), PCV15 (Vaxneuvance), PCV20 (Prevnar 20)  * Pneumococcal polysaccharide vaccine = PPSV23 (Pneumovax) Adults 25-60 years old: 1-3 doses may be recommended based on certain risk factors  Adults 72 years old: 1-2 doses may be recommended based off what pneumonia vaccine you previously received   Hepatitis B Vaccine 3 dose series if at intermediate or high risk (ex: diabetes, end stage renal disease, liver disease)   Tetanus (Td) Vaccine - COST NOT COVERED BY MEDICARE PART B Following completion of primary series, a booster dose should be given every 10 years to maintain immunity against tetanus  Td may also be given as tetanus wound prophylaxis  Tdap Vaccine - COST NOT COVERED BY MEDICARE PART B Recommended at least once for all adults  For pregnant patients, recommended with each pregnancy  Shingles Vaccine (Shingrix) - COST NOT COVERED BY MEDICARE PART B  2 shot series recommended in those aged 48 and above     Health Maintenance Due:      Topic Date Due   • Cervical Cancer Screening  06/11/2022   • Breast Cancer Screening: Mammogram  09/14/2022   • Colorectal Cancer Screening  04/29/2023   • Hepatitis C Screening  Completed     Immunizations Due:      Topic Date Due   • COVID-19 Vaccine (1) Never done   • Pneumococcal Vaccine: 65+ Years (1 - PCV) Never done   • Influenza Vaccine (1) Never done     Advance Directives   What are advance directives? Advance directives are legal documents that state your wishes and plans for medical care  These plans are made ahead of time in case you lose your ability to make decisions for yourself   Advance directives can apply to any medical decision, such as the treatments you want, and if you want to donate organs  What are the types of advance directives? There are many types of advance directives, and each state has rules about how to use them  You may choose a combination of any of the following:  · Living will: This is a written record of the treatment you want  You can also choose which treatments you do not want, which to limit, and which to stop at a certain time  This includes surgery, medicine, IV fluid, and tube feedings  · Durable power of  for healthcare [facility] [place]): This is a written record that states who you want to make healthcare choices for you when you are unable to make them for yourself  This person, called a proxy, is usually a family member or a friend  You may choose more than 1 proxy  · Do not resuscitate (DNR) order:  A DNR order is used in case your heart stops beating or you stop breathing  It is a request not to have certain forms of treatment, such as CPR  A DNR order may be included in other types of advance directives  · Medical directive: This covers the care that you want if you are in a coma, near death, or unable to make decisions for yourself  You can list the treatments you want for each condition  Treatment may include pain medicine, surgery, blood transfusions, dialysis, IV or tube feedings, and a ventilator (breathing machine)  · Values history: This document has questions about your views, beliefs, and how you feel and think about life  This information can help others choose the care that you would choose  Why are advance directives important? An advance directive helps you control your care  Although spoken wishes may be used, it is better to have your wishes written down  Spoken wishes can be misunderstood, or not followed  Treatments may be given even if you do not want them  An advance directive may make it easier for your family to make difficult choices about your care     Urinary Incontinence   Urinary incontinence (UI)  is when you lose control of your bladder  UI develops because your bladder cannot store or empty urine properly  The 3 most common types of UI are stress incontinence, urge incontinence, or both  Medicines:   · May be given to help strengthen your bladder control  Report any side effects of medication to your healthcare provider  Do pelvic muscle exercises often:  Your pelvic muscles help you stop urinating  Squeeze these muscles tight for 5 seconds, then relax for 5 seconds  Gradually work up to squeezing for 10 seconds  Do 3 sets of 15 repetitions a day, or as directed  This will help strengthen your pelvic muscles and improve bladder control  Train your bladder:  Go to the bathroom at set times, such as every 2 hours, even if you do not feel the urge to go  You can also try to hold your urine when you feel the urge to go  For example, hold your urine for 5 minutes when you feel the urge to go  As that becomes easier, hold your urine for 10 minutes  Self-care:   · Keep a UI record  Write down how often you leak urine and how much you leak  Make a note of what you were doing when you leaked urine  · Drink liquids as directed  You may need to limit the amount of liquid you drink to help control your urine leakage  Do not drink any liquid right before you go to bed  Limit or do not have drinks that contain caffeine or alcohol  · Prevent constipation  Eat a variety of high-fiber foods  Good examples are high-fiber cereals, beans, vegetables, and whole-grain breads  Walking is the best way to trigger your intestines to have a bowel movement  · Exercise regularly and maintain a healthy weight  Weight loss and exercise will decrease pressure on your bladder and help you control your leakage  · Use a catheter as directed  to help empty your bladder  A catheter is a tiny, plastic tube that is put into your bladder to drain your urine     · Go to behavior therapy as directed  Behavior therapy may be used to help you learn to control your urge to urinate  © Copyright [employer] 2018 Information is for End User's use only and may not be sold, redistributed or otherwise used for commercial purposes   All illustrations and images included in CareNotes® are the copyrighted property of [employer]  or [address] [place] [address]

## 2023-03-07 NOTE — PROGRESS NOTES
Assessment and Plan:     Problem List Items Addressed This Visit        Musculoskeletal and Integument    Osteopenia of neck of femur       Other    Other hyperlipidemia    Relevant Orders    Hemoglobin A1C    Lipid Panel with Direct LDL reflex   Other Visit Diagnoses     Medicare annual wellness visit, subsequent    -  Primary    Post-menopausal        Relevant Orders    DXA bone density spine hip and pelvis    Screening mammogram for breast cancer        Relevant Orders    Mammo screening bilateral w 3d & cad    Primary hypertension        Relevant Orders    CBC and differential    Comprehensive metabolic panel    TSH, 3rd generation with Free T4 reflex        BMI Counseling: Body mass index is 26 45 kg/m²  The BMI is above normal  Nutrition recommendations include decreasing portion sizes and encouraging healthy choices of fruits and vegetables  Exercise recommendations include moderate physical activity 150 minutes/week  Rationale for BMI follow-up plan is due to patient being overweight or obese  Urinary Incontinence Plan of Care: counseling topics discussed: limit alcohol, caffeine, spicy foods, and acidic foods and limiting fluid intake 3-4 hours before bed  Preventive health issues were discussed with patient, and age appropriate screening tests were ordered as noted in patient's After Visit Summary  Personalized health advice and appropriate referrals for health education or preventive services given if needed, as noted in patient's After Visit Summary  History of Present Illness:     Patient presents for a Medicare Wellness Visit    Here for AWV;     Argentina Goncalves is doing well; she reports that she gained some weight because her  cooks for her but she will start exercising; he will be undergoing surgery soon so she is anxious and hopes it goes well  Episodes of HTN here but usually controlled at home; will keep an eye on it  Obtain new set of labs      Due for mammogram, pap, DEXA, and colonoscopy  Patient Care Team:  Felisa Del Rosario MD as PCP - General (Internal Medicine)  MARIA TERESA Alcala     Review of Systems:     Review of Systems   All other systems reviewed and are negative  Problem List:     Patient Active Problem List   Diagnosis   • Mild vitamin D deficiency   • Encounter for gynecological examination without abnormal finding   • Osteopenia of neck of femur   • Other hyperlipidemia   • Elevated HDL   • Elevated LDL cholesterol level      Past Medical and Surgical History:     Past Medical History:   Diagnosis Date   • Arthritis    • Vocal cord nodules     removed     Past Surgical History:   Procedure Laterality Date   •  SECTION     • LARYNGOSCOPY      with stripping of vocal cords      Family History:     Family History   Problem Relation Age of Onset   • Lung cancer Mother    • Breast cancer Mother 39   • Diabetes type I Mother    • Lung cancer Father    • Cancer Father    • Diabetes type I Father    • Diabetes Daughter         mellitus   • Diabetes Son         mellitus   • Mental illness Son         mental disorder   • No Known Problems Sister    • No Known Problems Maternal Grandmother    • No Known Problems Paternal Grandmother    • No Known Problems Daughter       Social History:     Social History     Socioeconomic History   • Marital status: /Civil Union     Spouse name: None   • Number of children: None   • Years of education: None   • Highest education level: None   Occupational History   • None   Tobacco Use   • Smoking status: Never   • Smokeless tobacco: Never   Vaping Use   • Vaping Use: Never used   Substance and Sexual Activity   • Alcohol use:  Yes     Alcohol/week: 2 0 standard drinks     Types: 2 Glasses of wine per week     Comment: weekly   • Drug use: No   • Sexual activity: Not Currently     Birth control/protection: Post-menopausal   Other Topics Concern   • None   Social History Narrative    Always uses seat belt    Daily caffeine consumption, 4-5 servings a day     Social Determinants of Health     Financial Resource Strain: Low Risk    • Difficulty of Paying Living Expenses: Not very hard   Food Insecurity: Not on file   Transportation Needs: No Transportation Needs   • Lack of Transportation (Medical): No   • Lack of Transportation (Non-Medical): No   Physical Activity: Not on file   Stress: Not on file   Social Connections: Not on file   Intimate Partner Violence: Not on file   Housing Stability: Not on file      Medications and Allergies:     Current Outpatient Medications   Medication Sig Dispense Refill   • Cholecalciferol (VITAMIN D3) 50 MCG (2000 UT) capsule Take 1 capsule (2,000 Units total) by mouth daily 100 capsule 2   • multivitamin-minerals (CENTRUM) tablet Take by mouth        No current facility-administered medications for this visit  No Known Allergies   Immunizations:     Immunization History   Administered Date(s) Administered   • Tdap 08/03/2018      Health Maintenance:         Topic Date Due   • Cervical Cancer Screening  06/11/2022   • Breast Cancer Screening: Mammogram  09/14/2022   • Colorectal Cancer Screening  04/29/2023   • Hepatitis C Screening  Completed         Topic Date Due   • COVID-19 Vaccine (1) Never done   • Pneumococcal Vaccine: 65+ Years (1 - PCV) Never done   • Influenza Vaccine (1) Never done      Medicare Screening Tests and Risk Assessments:     Vielka Guerra is here for her Subsequent Wellness visit  Last Medicare Wellness visit information reviewed, patient interviewed and updates made to the record today  Health Risk Assessment:   Patient rates overall health as very good  Patient feels that their physical health rating is slightly better  Patient is satisfied with their life  Eyesight was rated as slightly worse  Hearing was rated as same  Patient feels that their emotional and mental health rating is same  Patients states they are never, rarely angry   Patient states they are sometimes unusually tired/fatigued  Pain experienced in the last 7 days has been none  Patient states that she has experienced no weight loss or gain in last 6 months  Fall Risk Screening: In the past year, patient has experienced: no history of falling in past year      Urinary Incontinence Screening:   Patient has leaked urine accidently in the last six months  Home Safety:  Patient does not have trouble with stairs inside or outside of their home  Patient has working smoke alarms and has working carbon monoxide detector  Home safety hazards include: none  Nutrition:   Current diet is Low Carb  Medications:   Patient is currently taking over-the-counter supplements  OTC medications include: Women’s One A Day vitamin, D3 + K2  [id number] 50 mcg k2 MK-7 , Black Elderberry vitamin C& Zinc  Patient is able to manage medications  I don't take any medication    Activities of Daily Living (ADLs)/Instrumental Activities of Daily Living (IADLs):   Walk and transfer into and out of bed and chair?: Yes  Dress and groom yourself?: Yes    Bathe or shower yourself?: Yes    Feed yourself? Yes  Do your laundry/housekeeping?: Yes  Manage your money, pay your bills and track your expenses?: Yes  Make your own meals?: Yes    Do your own shopping?: Yes    Previous Hospitalizations:   Any hospitalizations or ED visits within the last 12 months?: No      Advance Care Planning:   Living will: Yes    Durable POA for healthcare:  Yes    Advanced directive: No      Cognitive Screening:   Provider or family/friend/caregiver concerned regarding cognition?: No    PREVENTIVE SCREENINGS      Cardiovascular Screening:    General: History Lipid Disorder    Due for: Lipid Panel      Diabetes Screening:     General: Screening Current    Due for: Blood Glucose      Colorectal Cancer Screening:     General: Screening Current      Breast Cancer Screening:     General: Risks and Benefits Discussed    Due for: Mammogram        Cervical Cancer Screening:    General: Screening Not Indicated and Risks and Benefits Discussed    Due for: Cervical Pap Smear      Osteoporosis Screening:      Due for: DXA Axial and DXA Appendicular      Abdominal Aortic Aneurysm (AAA) Screening:        General: Screening Not Indicated      Lung Cancer Screening:     General: Screening Not Indicated      Hepatitis C Screening:    General: Screening Current    Screening, Brief Intervention, and Referral to Treatment (SBIRT)    Screening  Typical number of drinks in a day: 0  Typical number of drinks in a week: 2  Interpretation: Low risk drinking behavior  AUDIT-C Screenin) How often did you have a drink containing alcohol in the past year? 2 to 4 times a month  2) How many drinks did you have on a typical day when you were drinking in the past year? 0  3) How often did you have 6 or more drinks on one occasion in the past year? never    AUDIT-C Score: 2  Interpretation: Score 0-2 (female): Negative screen for alcohol misuse    Single Item Drug Screening:  How often have you used an illegal drug (including marijuana) or a prescription medication for non-medical reasons in the past year? never    Single Item Drug Screen Score: 0  Interpretation: Negative screen for possible drug use disorder    No results found  Physical Exam:     /86 (BP Location: Left arm, Patient Position: Sitting, Cuff Size: Adult)   Pulse 84   Resp 16   Ht 5' 2" (1 575 m)   Wt 65 6 kg (144 lb 9 6 oz)   LMP  (LMP Unknown)   SpO2 98%   BMI 26 45 kg/m²     Physical Exam  Vitals and nursing note reviewed  Constitutional:       Appearance: Normal appearance  HENT:      Head: Normocephalic and atraumatic  Cardiovascular:      Rate and Rhythm: Normal rate and regular rhythm  Heart sounds: Normal heart sounds  Pulmonary:      Effort: Pulmonary effort is normal       Breath sounds: Normal breath sounds  Musculoskeletal:         General: Normal range of motion        Cervical back: Normal range of motion  Lymphadenopathy:      Cervical: No cervical adenopathy  Skin:     General: Skin is warm and dry  Neurological:      General: No focal deficit present  Mental Status: She is alert and oriented to person, place, and time  Mental status is at baseline     Psychiatric:         Mood and Affect: Mood normal           Glen Martínez MD

## 2023-04-06 ENCOUNTER — VBI (OUTPATIENT)
Dept: ADMINISTRATIVE | Facility: OTHER | Age: 68
End: 2023-04-06

## 2023-04-06 NOTE — TELEPHONE ENCOUNTER
04/06/23 3:30 PM     [facility] [employer] SmartForm used to document caregap status      [facility]

## 2023-04-20 ENCOUNTER — APPOINTMENT (OUTPATIENT)
Dept: LAB | Facility: HOSPITAL | Age: 68
End: 2023-04-20

## 2023-04-20 DIAGNOSIS — E78.49 OTHER HYPERLIPIDEMIA: ICD-10-CM

## 2023-04-20 DIAGNOSIS — I10 PRIMARY HYPERTENSION: ICD-10-CM

## 2023-04-20 LAB
ALBUMIN SERPL BCP-MCNC: 4 G/DL (ref 3.5–5)
ALP SERPL-CCNC: 49 U/L (ref 34–104)
ALT SERPL W P-5'-P-CCNC: 15 U/L (ref 7–52)
ANION GAP SERPL CALCULATED.3IONS-SCNC: 7 MMOL/L (ref 4–13)
AST SERPL W P-5'-P-CCNC: 18 U/L (ref 13–39)
BASOPHILS # BLD AUTO: 0.04 THOUSANDS/ΜL (ref 0–0.1)
BASOPHILS NFR BLD AUTO: 1 % (ref 0–1)
BILIRUB SERPL-MCNC: 0.53 MG/DL (ref 0.2–1)
BUN SERPL-MCNC: 17 MG/DL (ref 5–25)
CALCIUM SERPL-MCNC: 9.2 MG/DL (ref 8.4–10.2)
CHLORIDE SERPL-SCNC: 103 MMOL/L (ref 96–108)
CHOLEST SERPL-MCNC: 242 MG/DL
CO2 SERPL-SCNC: 26 MMOL/L (ref 21–32)
CREAT SERPL-MCNC: 0.65 MG/DL (ref 0.6–1.3)
EOSINOPHIL # BLD AUTO: 0.12 THOUSAND/ΜL (ref 0–0.61)
EOSINOPHIL NFR BLD AUTO: 2 % (ref 0–6)
ERYTHROCYTE [DISTWIDTH] IN BLOOD BY AUTOMATED COUNT: 13.5 % (ref 11.6–15.1)
GFR SERPL CREATININE-BSD FRML MDRD: 92 ML/MIN/1.73SQ M
GLUCOSE P FAST SERPL-MCNC: 83 MG/DL (ref 65–99)
HCT VFR BLD AUTO: 38.2 % (ref 34.8–46.1)
HDLC SERPL-MCNC: 81 MG/DL
HGB BLD-MCNC: 12.4 G/DL (ref 11.5–15.4)
IMM GRANULOCYTES # BLD AUTO: 0.02 THOUSAND/UL (ref 0–0.2)
IMM GRANULOCYTES NFR BLD AUTO: 0 % (ref 0–2)
LDLC SERPL CALC-MCNC: 148 MG/DL (ref 0–100)
LYMPHOCYTES # BLD AUTO: 2.56 THOUSANDS/ΜL (ref 0.6–4.47)
LYMPHOCYTES NFR BLD AUTO: 42 % (ref 14–44)
MCH RBC QN AUTO: 30 PG (ref 26.8–34.3)
MCHC RBC AUTO-ENTMCNC: 32.5 G/DL (ref 31.4–37.4)
MCV RBC AUTO: 93 FL (ref 82–98)
MONOCYTES # BLD AUTO: 0.46 THOUSAND/ΜL (ref 0.17–1.22)
MONOCYTES NFR BLD AUTO: 8 % (ref 4–12)
NEUTROPHILS # BLD AUTO: 2.84 THOUSANDS/ΜL (ref 1.85–7.62)
NEUTS SEG NFR BLD AUTO: 47 % (ref 43–75)
NRBC BLD AUTO-RTO: 0 /100 WBCS
PLATELET # BLD AUTO: 274 THOUSANDS/UL (ref 149–390)
PMV BLD AUTO: 9.9 FL (ref 8.9–12.7)
POTASSIUM SERPL-SCNC: 4 MMOL/L (ref 3.5–5.3)
PROT SERPL-MCNC: 7.3 G/DL (ref 6.4–8.4)
RBC # BLD AUTO: 4.13 MILLION/UL (ref 3.81–5.12)
SODIUM SERPL-SCNC: 136 MMOL/L (ref 135–147)
TRIGL SERPL-MCNC: 65 MG/DL
TSH SERPL DL<=0.05 MIU/L-ACNC: 2.14 UIU/ML (ref 0.45–4.5)
WBC # BLD AUTO: 6.04 THOUSAND/UL (ref 4.31–10.16)

## 2023-04-21 LAB
EST. AVERAGE GLUCOSE BLD GHB EST-MCNC: 114 MG/DL
HBA1C MFR BLD: 5.6 %

## 2023-05-04 ENCOUNTER — HOSPITAL ENCOUNTER (OUTPATIENT)
Age: 68
Discharge: HOME | End: 2023-05-04

## 2023-05-04 VITALS — HEIGHT: 62 IN | WEIGHT: 144 LBS | BODY MASS INDEX: 26.5 KG/M2

## 2023-05-04 DIAGNOSIS — Z12.31 SCREENING MAMMOGRAM FOR BREAST CANCER: ICD-10-CM

## 2023-05-15 ENCOUNTER — VBI (OUTPATIENT)
Dept: ADMINISTRATIVE | Facility: OTHER | Age: 68
End: 2023-05-15

## 2023-09-12 ENCOUNTER — OFFICE VISIT (OUTPATIENT)
Dept: INTERNAL MEDICINE CLINIC | Facility: CLINIC | Age: 68
End: 2023-09-12
Payer: COMMERCIAL

## 2023-09-12 VITALS
HEIGHT: 62 IN | WEIGHT: 149 LBS | BODY MASS INDEX: 27.42 KG/M2 | OXYGEN SATURATION: 98 % | SYSTOLIC BLOOD PRESSURE: 160 MMHG | DIASTOLIC BLOOD PRESSURE: 110 MMHG | HEART RATE: 74 BPM

## 2023-09-12 DIAGNOSIS — R03.0 TRANSIENT ELEVATED BLOOD PRESSURE: Primary | ICD-10-CM

## 2023-09-12 DIAGNOSIS — E78.49 OTHER HYPERLIPIDEMIA: ICD-10-CM

## 2023-09-12 PROBLEM — Z01.419 ENCOUNTER FOR GYNECOLOGICAL EXAMINATION WITHOUT ABNORMAL FINDING: Status: RESOLVED | Noted: 2018-06-04 | Resolved: 2023-09-12

## 2023-09-12 PROCEDURE — 99214 OFFICE O/P EST MOD 30 MIN: CPT | Performed by: INTERNAL MEDICINE

## 2023-09-12 NOTE — PROGRESS NOTES
Assessment/Plan:     Patient is here for follow-up. We discussed her chronic medical problems. Blood pressure is elevated. It is usually controlled at home. She will call me with blood pressures. Check CBC, CMP, TSH, A1c, lipid at her next before next visit;     H/o hyperlipidemia, work on diet. Still due for few screenings. Quality Measures:     BMI Counseling: Body mass index is 27.25 kg/m². The BMI is above normal. Nutrition recommendations include decreasing portion sizes and encouraging healthy choices of fruits and vegetables. Exercise recommendations include moderate physical activity 150 minutes/week. Rationale for BMI follow-up plan is due to patient being overweight or obese. Return in about 6 months (around 3/12/2024) for regular and medicare. No problem-specific Assessment & Plan notes found for this encounter. Diagnoses and all orders for this visit:    Transient elevated blood pressure    Other hyperlipidemia    Other orders  -     Vitamin D-Vitamin K (VITAMIN K2-VITAMIN D3 PO); Take by mouth          Subjective:      Patient ID: Gordy Wild is a 80 y.o. female. Here for follow-up.       ALLERGIES:  No Known Allergies    CURRENT MEDICATIONS:    Current Outpatient Medications:   •  multivitamin-minerals (CENTRUM) tablet, Take by mouth , Disp: , Rfl:   •  Vitamin D-Vitamin K (VITAMIN K2-VITAMIN D3 PO), Take by mouth, Disp: , Rfl:     ACTIVE PROBLEM LIST:  Patient Active Problem List   Diagnosis   • Mild vitamin D deficiency   • Osteopenia of neck of femur   • Other hyperlipidemia   • Elevated HDL   • Elevated LDL cholesterol level       PAST MEDICAL HISTORY:  Past Medical History:   Diagnosis Date   • Arthritis    • Vocal cord nodules     removed       PAST SURGICAL HISTORY:  Past Surgical History:   Procedure Laterality Date   •  SECTION     • LARYNGOSCOPY      with stripping of vocal cords       FAMILY HISTORY:  Family History   Problem Relation Age of Onset   • Lung cancer Mother    • Breast cancer Mother 39   • Diabetes type I Mother    • Lung cancer Father    • Cancer Father    • Diabetes type I Father    • Diabetes Daughter         mellitus   • Diabetes Son         mellitus   • Mental illness Son         Recently had a change in medication   • No Known Problems Sister    • No Known Problems Maternal Grandmother    • No Known Problems Paternal Grandmother    • No Known Problems Daughter    • Stroke Son         Recently experienced another stroke   • Diabetes Son        SOCIAL HISTORY:  Social History     Socioeconomic History   • Marital status: /Civil Union     Spouse name: Not on file   • Number of children: Not on file   • Years of education: Not on file   • Highest education level: Not on file   Occupational History   • Not on file   Tobacco Use   • Smoking status: Former     Packs/day: 0.50     Years: 30.00     Total pack years: 15.00     Types: Cigarettes     Start date: 1971     Quit date: 2001     Years since quittin.7   • Smokeless tobacco: Never   Vaping Use   • Vaping Use: Never used   Substance and Sexual Activity   • Alcohol use: Yes     Alcohol/week: 2.0 standard drinks of alcohol     Types: 2 Glasses of wine per week     Comment: weekly   • Drug use: No   • Sexual activity: Not Currently     Birth control/protection: Post-menopausal   Other Topics Concern   • Not on file   Social History Narrative    Always uses seat belt    Daily caffeine consumption, 4-5 servings a day     Social Determinants of Health     Financial Resource Strain: Low Risk  (3/6/2023)    Overall Financial Resource Strain (CARDIA)    • Difficulty of Paying Living Expenses: Not very hard   Food Insecurity: Not on file   Transportation Needs: No Transportation Needs (3/6/2023)    PRAPARE - Transportation    • Lack of Transportation (Medical): No    • Lack of Transportation (Non-Medical):  No   Physical Activity: Not on file   Stress: Not on file   Social Connections: Not on file   Intimate Partner Violence: Not on file   Housing Stability: Not on file       Review of Systems   Constitutional: Negative for chills and fever. HENT: Negative for ear pain and sore throat. Eyes: Negative for pain and visual disturbance. Respiratory: Negative for cough and shortness of breath. Cardiovascular: Negative for chest pain and palpitations. Gastrointestinal: Negative for abdominal pain and vomiting. Genitourinary: Negative for dysuria and hematuria. Musculoskeletal: Negative for arthralgias and back pain. Skin: Negative for color change and rash. Neurological: Negative for seizures and syncope. All other systems reviewed and are negative. Objective:  Vitals:    09/12/23 1016 09/12/23 1045   BP: (!) 150/110 (!) 160/110   BP Location: Left arm    Patient Position: Sitting    Cuff Size: Adult    Pulse: 74    SpO2: 98%    Weight: 67.6 kg (149 lb)    Height: 5' 2" (1.575 m)      Body mass index is 27.25 kg/m². Physical Exam  Vitals and nursing note reviewed. Constitutional:       Appearance: Normal appearance. HENT:      Head: Normocephalic and atraumatic. Cardiovascular:      Rate and Rhythm: Normal rate and regular rhythm. Heart sounds: Normal heart sounds. Pulmonary:      Effort: Pulmonary effort is normal.      Breath sounds: Normal breath sounds. Musculoskeletal:         General: Normal range of motion. Cervical back: Normal range of motion. Right lower leg: No edema. Left lower leg: No edema. Lymphadenopathy:      Cervical: No cervical adenopathy. Skin:     General: Skin is warm and dry. Neurological:      General: No focal deficit present. Mental Status: She is alert and oriented to person, place, and time. Mental status is at baseline.    Psychiatric:         Mood and Affect: Mood normal.           RESULTS:  Hemoglobin A1C   Date/Time Value Ref Range Status   04/20/2023 12:40 PM 5.6 Normal 3.8-5.6%; PreDiabetic 5.7-6.4%; Diabetic >=6.5%; Glycemic control for adults with diabetes <7.0% % Final     Cholesterol   Date/Time Value Ref Range Status   04/20/2023 12:40  (H) See Comment mg/dL Final     Comment:     Cholesterol:         Pediatric <18 Years        Desirable          <170 mg/dL      Borderline High    170-199 mg/dL      High               >=200 mg/dL        Adult >=18 Years            Desirable        <200 mg/dL      Borderline High  200-239 mg/dL      High             >239 mg/dL       Cholesterol, Total   Date/Time Value Ref Range Status   04/10/2020 09:29  (H) 100 - 199 mg/dL Final     Triglycerides   Date/Time Value Ref Range Status   04/20/2023 12:40 PM 65 See Comment mg/dL Final     Comment:     Triglyceride:     0-9Y            <75mg/dL     10Y-17Y         <90 mg/dL       >=18Y     Normal          <150 mg/dL     Borderline High 150-199 mg/dL     High            200-499 mg/dL        Very High       >499 mg/dL    Specimen collection should occur prior to N-Acetylcysteine or Metamizole administration due to the potential for falsely depressed results. 04/10/2020 09:29  0 - 149 mg/dL Final     HDL   Date/Time Value Ref Range Status   04/10/2020 09:29 AM 67 >39 mg/dL Final     HDL, Direct   Date/Time Value Ref Range Status   04/20/2023 12:40 PM 81 >=50 mg/dL Final     LDL Calculated   Date/Time Value Ref Range Status   04/20/2023 12:40  (H) 0 - 100 mg/dL Final     Comment:     LDL Cholesterol:     Optimal           <100 mg/dl     Near Optimal      100-129 mg/dl     Above Optimal       Borderline High 130-159 mg/dl       High            160-189 mg/dl       Very High       >189 mg/dl         This screening LDL is a calculated result. It does not have the accuracy of the Direct Measured LDL in the monitoring of patients with hyperlipidemia and/or statin therapy. Direct Measure LDL ([id number]) must be ordered separately in these patients.    04/10/2020 09:29  (H) 0 - 99 mg/dL Final     Hemoglobin   Date/Time Value Ref Range Status   04/20/2023 12:40 PM 12.4 11.5 - 15.4 g/dL Final     Hematocrit   Date/Time Value Ref Range Status   04/20/2023 12:40 PM 38.2 34.8 - 46.1 % Final     Platelets   Date/Time Value Ref Range Status   04/20/2023 12:40  149 - 390 Thousands/uL Final     TSH 3RD GENERATON   Date/Time Value Ref Range Status   04/20/2023 12:40 PM 2.137 0.450 - 4.500 uIU/mL Final     Comment:     The recommended reference ranges for TSH during pregnancy are as follows:   First trimester 0.1 to 2.5 uIU/mL   Second trimester  0.2 to 3.0 uIU/mL   Third trimester 0.3 to 3.0 uIU/m    Note: Normal ranges may not apply to patients who are transgender, non-binary, or whose legal sex, sex at birth, and gender identity differ. Adult TSH (3rd generation) reference range follows the recommended guidelines of the [employer], January, 2020. Sodium   Date/Time Value Ref Range Status   04/20/2023 12:40  135 - 147 mmol/L Final   04/10/2020 09:29  134 - 144 mmol/L Final     BUN   Date/Time Value Ref Range Status   04/20/2023 12:40 PM 17 5 - 25 mg/dL Final   04/10/2020 09:29 AM 14 8 - 27 mg/dL Final     Creatinine   Date/Time Value Ref Range Status   04/20/2023 12:40 PM 0.65 0.60 - 1.30 mg/dL Final     Comment:     Standardized to IDMS reference method      In chart    This note was created with voice recognition software. Phonic, grammatical and spelling errors may be present within the note as a result.

## 2023-09-29 ENCOUNTER — TELEPHONE (OUTPATIENT)
Dept: INTERNAL MEDICINE CLINIC | Facility: CLINIC | Age: 68
End: 2023-09-29

## 2023-09-29 NOTE — TELEPHONE ENCOUNTER
Patient said that she is feeling under the weather and wanted to know if she could have caught her husbands Multi Focal Pneumonia. Patient said that she has a sore throat, fleam cough. She wanted to know if she should be on a z-pack would be good for her. Please advise. ..... Jimmy Schreiber If something is sent in for the patient please notify her.

## 2023-10-03 ENCOUNTER — OFFICE VISIT (OUTPATIENT)
Dept: INTERNAL MEDICINE CLINIC | Facility: CLINIC | Age: 68
End: 2023-10-03
Payer: COMMERCIAL

## 2023-10-03 VITALS
BODY MASS INDEX: 27.02 KG/M2 | HEIGHT: 62 IN | OXYGEN SATURATION: 99 % | SYSTOLIC BLOOD PRESSURE: 156 MMHG | WEIGHT: 146.8 LBS | DIASTOLIC BLOOD PRESSURE: 102 MMHG | RESPIRATION RATE: 16 BRPM | HEART RATE: 82 BPM | TEMPERATURE: 99.4 F

## 2023-10-03 DIAGNOSIS — R05.3 CHRONIC COUGH: Primary | ICD-10-CM

## 2023-10-03 PROCEDURE — 99213 OFFICE O/P EST LOW 20 MIN: CPT | Performed by: INTERNAL MEDICINE

## 2023-10-03 RX ORDER — METHYLPREDNISOLONE 4 MG/1
TABLET ORAL
Qty: 21 EACH | Refills: 0 | Status: SHIPPED | OUTPATIENT
Start: 2023-10-03

## 2023-10-03 NOTE — PROGRESS NOTES
Assessment/Plan:     Patient is here with sore throat cough. Recently given z pack; Today is her last dose. Did discuss that this will work for additional 5 days. She denies any fever. Physical exam is mostly benign. We will send a course of steroids. She will call me on Friday line how she is doing. Quality Measures:     BMI Counseling: Body mass index is 26.85 kg/m². The BMI is above normal. Nutrition recommendations include decreasing portion sizes and encouraging healthy choices of fruits and vegetables. Exercise recommendations include moderate physical activity 150 minutes/week. Rationale for BMI follow-up plan is due to patient being overweight or obese. Depression Screening and Follow-up Plan: Patient was screened for depression during today's encounter. They screened negative with a PHQ-2 score of 0. Return for Next scheduled follow up. No problem-specific Assessment & Plan notes found for this encounter. Diagnoses and all orders for this visit:    Chronic cough  -     methylPREDNISolone 4 MG tablet therapy pack; Use as directed on package          Subjective:      Patient ID: Brandon Barcenas is a 80 y.o. female.     Here with sick visiy      ALLERGIES:  No Known Allergies    CURRENT MEDICATIONS:    Current Outpatient Medications:   •  methylPREDNISolone 4 MG tablet therapy pack, Use as directed on package, Disp: 21 each, Rfl: 0  •  multivitamin-minerals (CENTRUM) tablet, Take by mouth , Disp: , Rfl:   •  Vitamin D-Vitamin K (VITAMIN K2-VITAMIN D3 PO), Take by mouth, Disp: , Rfl:     ACTIVE PROBLEM LIST:  Patient Active Problem List   Diagnosis   • Mild vitamin D deficiency   • Osteopenia of neck of femur   • Other hyperlipidemia   • Elevated HDL   • Elevated LDL cholesterol level       PAST MEDICAL HISTORY:  Past Medical History:   Diagnosis Date   • Arthritis    • Vocal cord nodules     removed       PAST SURGICAL HISTORY:  Past Surgical History:   Procedure Laterality Date   •  SECTION     • LARYNGOSCOPY      with stripping of vocal cords       FAMILY HISTORY:  Family History   Problem Relation Age of Onset   • Lung cancer Mother    • Breast cancer Mother 39   • Diabetes type I Mother    • Lung cancer Father    • Cancer Father    • Diabetes type I Father    • Diabetes Daughter         mellitus   • Diabetes Son         mellitus   • Mental illness Son         Recently had a change in medication   • No Known Problems Sister    • No Known Problems Maternal Grandmother    • No Known Problems Paternal Grandmother    • No Known Problems Daughter    • Stroke Son         Recently experienced another stroke   • Diabetes Son        SOCIAL HISTORY:  Social History     Socioeconomic History   • Marital status: /Civil Union     Spouse name: Not on file   • Number of children: Not on file   • Years of education: Not on file   • Highest education level: Not on file   Occupational History   • Not on file   Tobacco Use   • Smoking status: Former     Packs/day: 0.50     Years: 30.00     Total pack years: 15.00     Types: Cigarettes     Start date: 1971     Quit date: 2001     Years since quittin.7   • Smokeless tobacco: Never   Vaping Use   • Vaping Use: Never used   Substance and Sexual Activity   • Alcohol use:  Yes     Alcohol/week: 2.0 standard drinks of alcohol     Types: 2 Glasses of wine per week     Comment: weekly   • Drug use: No   • Sexual activity: Not Currently     Birth control/protection: Post-menopausal   Other Topics Concern   • Not on file   Social History Narrative    Always uses seat belt    Daily caffeine consumption, 4-5 servings a day     Social Determinants of Health     Financial Resource Strain: Low Risk  (3/6/2023)    Overall Financial Resource Strain (CARDIA)    • Difficulty of Paying Living Expenses: Not very hard   Food Insecurity: Not on file   Transportation Needs: No Transportation Needs (3/6/2023)    PRAPARE - Transportation    • Lack of Transportation (Medical): No    • Lack of Transportation (Non-Medical): No   Physical Activity: Not on file   Stress: Not on file   Social Connections: Not on file   Intimate Partner Violence: Not on file   Housing Stability: Not on file       Review of Systems   Constitutional: Positive for fatigue. Negative for chills and fever. HENT: Positive for sore throat. Negative for ear pain. Eyes: Negative for pain and visual disturbance. Respiratory: Positive for cough. Negative for shortness of breath. Cardiovascular: Negative for chest pain and palpitations. Gastrointestinal: Negative for abdominal pain and vomiting. Genitourinary: Negative for dysuria and hematuria. Musculoskeletal: Positive for back pain. Negative for arthralgias. Skin: Negative for color change and rash. Neurological: Positive for headaches. Negative for seizures and syncope. All other systems reviewed and are negative. Objective:  Vitals:    10/03/23 1124   BP: (!) 156/102   BP Location: Left arm   Patient Position: Sitting   Cuff Size: Adult   Pulse: 82   Resp: 16   Temp: 99.4 °F (37.4 °C)   TempSrc: Tympanic   SpO2: 99%   Weight: 66.6 kg (146 lb 12.8 oz)   Height: 5' 2" (1.575 m)     Body mass index is 26.85 kg/m². Physical Exam  Vitals and nursing note reviewed. Constitutional:       Appearance: Normal appearance. She is well-developed. HENT:      Head: Normocephalic and atraumatic. Mouth/Throat: Tonsils: No tonsillar exudate. 0 on the right. 0 on the left. Cardiovascular:      Rate and Rhythm: Normal rate. Heart sounds: Normal heart sounds. Pulmonary:      Effort: Pulmonary effort is normal.      Breath sounds: Normal breath sounds. Musculoskeletal:         General: Normal range of motion. Cervical back: Normal range of motion. Right lower leg: No edema. Left lower leg: No edema. Skin:     General: Skin is warm and dry.    Neurological:      General: No focal deficit present. Mental Status: She is alert and oriented to person, place, and time. Mental status is at baseline. Psychiatric:         Mood and Affect: Mood normal.           RESULTS:  Hemoglobin A1C   Date/Time Value Ref Range Status   04/20/2023 12:40 PM 5.6 Normal 3.8-5.6%; PreDiabetic 5.7-6.4%; Diabetic >=6.5%; Glycemic control for adults with diabetes <7.0% % Final     Cholesterol   Date/Time Value Ref Range Status   04/20/2023 12:40  (H) See Comment mg/dL Final     Comment:     Cholesterol:         Pediatric <18 Years        Desirable          <170 mg/dL      Borderline High    170-199 mg/dL      High               >=200 mg/dL        Adult >=18 Years            Desirable        <200 mg/dL      Borderline High  200-239 mg/dL      High             >239 mg/dL       Cholesterol, Total   Date/Time Value Ref Range Status   04/10/2020 09:29  (H) 100 - 199 mg/dL Final     Triglycerides   Date/Time Value Ref Range Status   04/20/2023 12:40 PM 65 See Comment mg/dL Final     Comment:     Triglyceride:     0-9Y            <75mg/dL     10Y-17Y         <90 mg/dL       >=18Y     Normal          <150 mg/dL     Borderline High 150-199 mg/dL     High            200-499 mg/dL        Very High       >499 mg/dL    Specimen collection should occur prior to N-Acetylcysteine or Metamizole administration due to the potential for falsely depressed results.    04/10/2020 09:29  0 - 149 mg/dL Final     HDL   Date/Time Value Ref Range Status   04/10/2020 09:29 AM 67 >39 mg/dL Final     HDL, Direct   Date/Time Value Ref Range Status   04/20/2023 12:40 PM 81 >=50 mg/dL Final     LDL Calculated   Date/Time Value Ref Range Status   04/20/2023 12:40  (H) 0 - 100 mg/dL Final     Comment:     LDL Cholesterol:     Optimal           <100 mg/dl     Near Optimal      100-129 mg/dl     Above Optimal       Borderline High 130-159 mg/dl       High            160-189 mg/dl       Very High       >189 mg/dl         This screening LDL is a calculated result. It does not have the accuracy of the Direct Measured LDL in the monitoring of patients with hyperlipidemia and/or statin therapy. Direct Measure LDL (NYT285) must be ordered separately in these patients. 04/10/2020 09:29  (H) 0 - 99 mg/dL Final     Hemoglobin   Date/Time Value Ref Range Status   04/20/2023 12:40 PM 12.4 11.5 - 15.4 g/dL Final     Hematocrit   Date/Time Value Ref Range Status   04/20/2023 12:40 PM 38.2 34.8 - 46.1 % Final     Platelets   Date/Time Value Ref Range Status   04/20/2023 12:40  149 - 390 Thousands/uL Final     TSH 3RD GENERATON   Date/Time Value Ref Range Status   04/20/2023 12:40 PM 2.137 0.450 - 4.500 uIU/mL Final     Comment:     The recommended reference ranges for TSH during pregnancy are as follows:   First trimester 0.1 to 2.5 uIU/mL   Second trimester  0.2 to 3.0 uIU/mL   Third trimester 0.3 to 3.0 uIU/m    Note: Normal ranges may not apply to patients who are transgender, non-binary, or whose legal sex, sex at birth, and gender identity differ. Adult TSH (3rd generation) reference range follows the recommended guidelines of the [employer], January, 2020. Sodium   Date/Time Value Ref Range Status   04/20/2023 12:40  135 - 147 mmol/L Final   04/10/2020 09:29  134 - 144 mmol/L Final     BUN   Date/Time Value Ref Range Status   04/20/2023 12:40 PM 17 5 - 25 mg/dL Final   04/10/2020 09:29 AM 14 8 - 27 mg/dL Final     Creatinine   Date/Time Value Ref Range Status   04/20/2023 12:40 PM 0.65 0.60 - 1.30 mg/dL Final     Comment:     Standardized to IDMS reference method      In chart    This note was created with voice recognition software. Phonic, grammatical and spelling errors may be present within the note as a result.

## 2024-02-09 ENCOUNTER — VBI (OUTPATIENT)
Dept: ADMINISTRATIVE | Facility: OTHER | Age: 69
End: 2024-02-09

## 2024-03-12 ENCOUNTER — RA CDI HCC (OUTPATIENT)
Dept: OTHER | Facility: HOSPITAL | Age: 69
End: 2024-03-12

## 2024-04-04 ENCOUNTER — APPOINTMENT (OUTPATIENT)
Dept: LAB | Facility: HOSPITAL | Age: 69
End: 2024-04-04
Payer: COMMERCIAL

## 2024-04-04 ENCOUNTER — OFFICE VISIT (OUTPATIENT)
Dept: INTERNAL MEDICINE CLINIC | Facility: CLINIC | Age: 69
End: 2024-04-04
Payer: COMMERCIAL

## 2024-04-04 VITALS
BODY MASS INDEX: 26.83 KG/M2 | HEIGHT: 62 IN | WEIGHT: 145.8 LBS | DIASTOLIC BLOOD PRESSURE: 88 MMHG | OXYGEN SATURATION: 99 % | SYSTOLIC BLOOD PRESSURE: 140 MMHG | HEART RATE: 82 BPM

## 2024-04-04 DIAGNOSIS — F41.9 ANXIETY DISORDER, UNSPECIFIED TYPE: ICD-10-CM

## 2024-04-04 DIAGNOSIS — I10 PRIMARY HYPERTENSION: ICD-10-CM

## 2024-04-04 DIAGNOSIS — R73.01 IMPAIRED FASTING BLOOD SUGAR: ICD-10-CM

## 2024-04-04 DIAGNOSIS — E55.9 MILD VITAMIN D DEFICIENCY: ICD-10-CM

## 2024-04-04 DIAGNOSIS — Z12.31 ENCOUNTER FOR SCREENING MAMMOGRAM FOR MALIGNANT NEOPLASM OF BREAST: ICD-10-CM

## 2024-04-04 DIAGNOSIS — E78.49 OTHER HYPERLIPIDEMIA: ICD-10-CM

## 2024-04-04 DIAGNOSIS — E78.49 OTHER HYPERLIPIDEMIA: Primary | ICD-10-CM

## 2024-04-04 DIAGNOSIS — R03.0 ELEVATED BLOOD PRESSURE READING IN OFFICE WITHOUT DIAGNOSIS OF HYPERTENSION: ICD-10-CM

## 2024-04-04 DIAGNOSIS — Z00.00 MEDICARE ANNUAL WELLNESS VISIT, SUBSEQUENT: Primary | ICD-10-CM

## 2024-04-04 DIAGNOSIS — M25.552 LEFT HIP PAIN: ICD-10-CM

## 2024-04-04 DIAGNOSIS — Z12.11 SCREEN FOR COLON CANCER: ICD-10-CM

## 2024-04-04 LAB
ALBUMIN SERPL BCP-MCNC: 3.9 G/DL (ref 3.5–5)
ALP SERPL-CCNC: 56 U/L (ref 34–104)
ALT SERPL W P-5'-P-CCNC: 12 U/L (ref 7–52)
ANION GAP SERPL CALCULATED.3IONS-SCNC: 5 MMOL/L (ref 4–13)
AST SERPL W P-5'-P-CCNC: 16 U/L (ref 13–39)
BASOPHILS # BLD AUTO: 0.03 THOUSANDS/ÂΜL (ref 0–0.1)
BASOPHILS NFR BLD AUTO: 1 % (ref 0–1)
BILIRUB SERPL-MCNC: 0.39 MG/DL (ref 0.2–1)
BUN SERPL-MCNC: 14 MG/DL (ref 5–25)
CALCIUM SERPL-MCNC: 8.9 MG/DL (ref 8.4–10.2)
CHLORIDE SERPL-SCNC: 106 MMOL/L (ref 96–108)
CHOLEST SERPL-MCNC: 236 MG/DL
CO2 SERPL-SCNC: 28 MMOL/L (ref 21–32)
CREAT SERPL-MCNC: 0.6 MG/DL (ref 0.6–1.3)
EOSINOPHIL # BLD AUTO: 0.16 THOUSAND/ÂΜL (ref 0–0.61)
EOSINOPHIL NFR BLD AUTO: 3 % (ref 0–6)
ERYTHROCYTE [DISTWIDTH] IN BLOOD BY AUTOMATED COUNT: 14.1 % (ref 11.6–15.1)
EST. AVERAGE GLUCOSE BLD GHB EST-MCNC: 123 MG/DL
GFR SERPL CREATININE-BSD FRML MDRD: 94 ML/MIN/1.73SQ M
GLUCOSE P FAST SERPL-MCNC: 91 MG/DL (ref 65–99)
HBA1C MFR BLD: 5.9 %
HCT VFR BLD AUTO: 36.2 % (ref 34.8–46.1)
HDLC SERPL-MCNC: 67 MG/DL
HGB BLD-MCNC: 11.9 G/DL (ref 11.5–15.4)
IMM GRANULOCYTES # BLD AUTO: 0.01 THOUSAND/UL (ref 0–0.2)
IMM GRANULOCYTES NFR BLD AUTO: 0 % (ref 0–2)
LDLC SERPL CALC-MCNC: 143 MG/DL (ref 0–100)
LYMPHOCYTES # BLD AUTO: 2.26 THOUSANDS/ÂΜL (ref 0.6–4.47)
LYMPHOCYTES NFR BLD AUTO: 41 % (ref 14–44)
MCH RBC QN AUTO: 29.6 PG (ref 26.8–34.3)
MCHC RBC AUTO-ENTMCNC: 32.9 G/DL (ref 31.4–37.4)
MCV RBC AUTO: 90 FL (ref 82–98)
MONOCYTES # BLD AUTO: 0.34 THOUSAND/ÂΜL (ref 0.17–1.22)
MONOCYTES NFR BLD AUTO: 6 % (ref 4–12)
NEUTROPHILS # BLD AUTO: 2.75 THOUSANDS/ÂΜL (ref 1.85–7.62)
NEUTS SEG NFR BLD AUTO: 49 % (ref 43–75)
NRBC BLD AUTO-RTO: 0 /100 WBCS
PLATELET # BLD AUTO: 265 THOUSANDS/UL (ref 149–390)
PMV BLD AUTO: 9.8 FL (ref 8.9–12.7)
POTASSIUM SERPL-SCNC: 4.4 MMOL/L (ref 3.5–5.3)
PROT SERPL-MCNC: 7 G/DL (ref 6.4–8.4)
RBC # BLD AUTO: 4.02 MILLION/UL (ref 3.81–5.12)
SODIUM SERPL-SCNC: 139 MMOL/L (ref 135–147)
TRIGL SERPL-MCNC: 131 MG/DL
TSH SERPL DL<=0.05 MIU/L-ACNC: 2.49 UIU/ML (ref 0.45–4.5)
WBC # BLD AUTO: 5.55 THOUSAND/UL (ref 4.31–10.16)

## 2024-04-04 PROCEDURE — 84443 ASSAY THYROID STIM HORMONE: CPT

## 2024-04-04 PROCEDURE — 80061 LIPID PANEL: CPT

## 2024-04-04 PROCEDURE — 80053 COMPREHEN METABOLIC PANEL: CPT

## 2024-04-04 PROCEDURE — G0439 PPPS, SUBSEQ VISIT: HCPCS | Performed by: INTERNAL MEDICINE

## 2024-04-04 PROCEDURE — 83036 HEMOGLOBIN GLYCOSYLATED A1C: CPT

## 2024-04-04 PROCEDURE — 99214 OFFICE O/P EST MOD 30 MIN: CPT | Performed by: INTERNAL MEDICINE

## 2024-04-04 PROCEDURE — 85025 COMPLETE CBC W/AUTO DIFF WBC: CPT

## 2024-04-04 PROCEDURE — 36415 COLL VENOUS BLD VENIPUNCTURE: CPT

## 2024-04-04 NOTE — PATIENT INSTRUCTIONS
Medicare Preventive Visit Patient Instructions  Thank you for completing your Welcome to Medicare Visit or Medicare Annual Wellness Visit today. Your next wellness visit will be due in one year (4/5/2025).  The screening/preventive services that you may require over the next 5-10 years are detailed below. Some tests may not apply to you based off risk factors and/or age. Screening tests ordered at today's visit but not completed yet may show as past due. Also, please note that scanned in results may not display below.  Preventive Screenings:  Service Recommendations Previous Testing/Comments   Colorectal Cancer Screening  * Colonoscopy    * Fecal Occult Blood Test (FOBT)/Fecal Immunochemical Test (FIT)  * Fecal DNA/Cologuard Test  * Flexible Sigmoidoscopy Age: 45-75 years old   Colonoscopy: every 10 years (may be performed more frequently if at higher risk)  OR  FOBT/FIT: every 1 year  OR  Cologuard: every 3 years  OR  Sigmoidoscopy: every 5 years  Screening may be recommended earlier than age 45 if at higher risk for colorectal cancer. Also, an individualized decision between you and your healthcare provider will decide whether screening between the ages of 76-85 would be appropriate. Colonoscopy: 03/21/2012  FOBT/FIT: Not on file  Cologuard: Not on file  Sigmoidoscopy: Not on file          Breast Cancer Screening Age: 40+ years old  Frequency: every 1-2 years  Not required if history of left and right mastectomy Mammogram: 05/04/2023    Screening Current   Cervical Cancer Screening Between the ages of 21-29, pap smear recommended once every 3 years.   Between the ages of 30-65, can perform pap smear with HPV co-testing every 5 years.   Recommendations may differ for women with a history of total hysterectomy, cervical cancer, or abnormal pap smears in past. Pap Smear: 06/11/2019    Screening Not Indicated   Hepatitis C Screening Once for adults born between 1945 and 1965  More frequently in patients at high risk  for Hepatitis C Hep C Antibody: 03/10/2022    Screening Current   Diabetes Screening 1-2 times per year if you're at risk for diabetes or have pre-diabetes Fasting glucose: 91 mg/dL (4/4/2024)  A1C: 5.6 % (4/20/2023)  Screening Current   Cholesterol Screening Once every 5 years if you don't have a lipid disorder. May order more often based on risk factors. Lipid panel: 04/20/2023    Screening Not Indicated  History Lipid Disorder     Other Preventive Screenings Covered by Medicare:  Abdominal Aortic Aneurysm (AAA) Screening: covered once if your at risk. You're considered to be at risk if you have a family history of AAA.  Lung Cancer Screening: covers low dose CT scan once per year if you meet all of the following conditions: (1) Age 55-77; (2) No signs or symptoms of lung cancer; (3) Current smoker or have quit smoking within the last 15 years; (4) You have a tobacco smoking history of at least 20 pack years (packs per day multiplied by number of years you smoked); (5) You get a written order from a healthcare provider.  Glaucoma Screening: covered annually if you're considered high risk: (1) You have diabetes OR (2) Family history of glaucoma OR (3)  aged 50 and older OR (4)  American aged 65 and older  Osteoporosis Screening: covered every 2 years if you meet one of the following conditions: (1) You're estrogen deficient and at risk for osteoporosis based off medical history and other findings; (2) Have a vertebral abnormality; (3) On glucocorticoid therapy for more than 3 months; (4) Have primary hyperparathyroidism; (5) On osteoporosis medications and need to assess response to drug therapy.   Last bone density test (DXA Scan): 07/16/2018.  HIV Screening: covered annually if you're between the age of 15-65. Also covered annually if you are younger than 15 and older than 65 with risk factors for HIV infection. For pregnant patients, it is covered up to 3 times per  pregnancy.    Immunizations:  Immunization Recommendations   Influenza Vaccine Annual influenza vaccination during flu season is recommended for all persons aged >= 6 months who do not have contraindications   Pneumococcal Vaccine   * Pneumococcal conjugate vaccine = PCV13 (Prevnar 13), PCV15 (Vaxneuvance), PCV20 (Prevnar 20)  * Pneumococcal polysaccharide vaccine = PPSV23 (Pneumovax) Adults 19-63 yo with certain risk factors or if 65+ yo  If never received any pneumonia vaccine: recommend Prevnar 20 (PCV20)  Give PCV20 if previously received 1 dose of PCV13 or PPSV23   Hepatitis B Vaccine 3 dose series if at intermediate or high risk (ex: diabetes, end stage renal disease, liver disease)   Respiratory syncytial virus (RSV) Vaccine - COVERED BY MEDICARE PART D  * RSVPreF3 (Arexvy) CDC recommends that adults 60 years of age and older may receive a single dose of RSV vaccine using shared clinical decision-making (SCDM)   Tetanus (Td) Vaccine - COST NOT COVERED BY MEDICARE PART B Following completion of primary series, a booster dose should be given every 10 years to maintain immunity against tetanus. Td may also be given as tetanus wound prophylaxis.   Tdap Vaccine - COST NOT COVERED BY MEDICARE PART B Recommended at least once for all adults. For pregnant patients, recommended with each pregnancy.   Shingles Vaccine (Shingrix) - COST NOT COVERED BY MEDICARE PART B  2 shot series recommended in those 19 years and older who have or will have weakened immune systems or those 50 years and older     Health Maintenance Due:      Topic Date Due   • Cervical Cancer Screening  06/11/2022   • Colorectal Cancer Screening  04/29/2023   • Breast Cancer Screening: Mammogram  05/04/2024   • Hepatitis C Screening  Completed     Immunizations Due:      Topic Date Due   • Pneumococcal Vaccine: 65+ Years (1 of 1 - PCV) Never done   • Influenza Vaccine (1) Never done   • COVID-19 Vaccine (1 - 2023-24 season) Never done     Advance  Directives   What are advance directives?  Advance directives are legal documents that state your wishes and plans for medical care. These plans are made ahead of time in case you lose your ability to make decisions for yourself. Advance directives can apply to any medical decision, such as the treatments you want, and if you want to donate organs.   What are the types of advance directives?  There are many types of advance directives, and each state has rules about how to use them. You may choose a combination of any of the following:  Living will:  This is a written record of the treatment you want. You can also choose which treatments you do not want, which to limit, and which to stop at a certain time. This includes surgery, medicine, IV fluid, and tube feedings.   Durable power of  for healthcare (DPAHC):  This is a written record that states who you want to make healthcare choices for you when you are unable to make them for yourself. This person, called a proxy, is usually a family member or a friend. You may choose more than 1 proxy.  Do not resuscitate (DNR) order:  A DNR order is used in case your heart stops beating or you stop breathing. It is a request not to have certain forms of treatment, such as CPR. A DNR order may be included in other types of advance directives.  Medical directive:  This covers the care that you want if you are in a coma, near death, or unable to make decisions for yourself. You can list the treatments you want for each condition. Treatment may include pain medicine, surgery, blood transfusions, dialysis, IV or tube feedings, and a ventilator (breathing machine).  Values history:  This document has questions about your views, beliefs, and how you feel and think about life. This information can help others choose the care that you would choose.  Why are advance directives important?  An advance directive helps you control your care. Although spoken wishes may be used, it  is better to have your wishes written down. Spoken wishes can be misunderstood, or not followed. Treatments may be given even if you do not want them. An advance directive may make it easier for your family to make difficult choices about your care.   Urinary Incontinence   Urinary incontinence (UI)  is when you lose control of your bladder. UI develops because your bladder cannot store or empty urine properly. The 3 most common types of UI are stress incontinence, urge incontinence, or both.  Medicines:   May be given to help strengthen your bladder control. Report any side effects of medication to your healthcare provider.  Do pelvic muscle exercises often:  Your pelvic muscles help you stop urinating. Squeeze these muscles tight for 5 seconds, then relax for 5 seconds. Gradually work up to squeezing for 10 seconds. Do 3 sets of 15 repetitions a day, or as directed. This will help strengthen your pelvic muscles and improve bladder control.  Train your bladder:  Go to the bathroom at set times, such as every 2 hours, even if you do not feel the urge to go. You can also try to hold your urine when you feel the urge to go. For example, hold your urine for 5 minutes when you feel the urge to go. As that becomes easier, hold your urine for 10 minutes.   Self-care:   Keep a UI record.  Write down how often you leak urine and how much you leak. Make a note of what you were doing when you leaked urine.  Drink liquids as directed. You may need to limit the amount of liquid you drink to help control your urine leakage. Do not drink any liquid right before you go to bed. Limit or do not have drinks that contain caffeine or alcohol.   Prevent constipation.  Eat a variety of high-fiber foods. Good examples are high-fiber cereals, beans, vegetables, and whole-grain breads. Walking is the best way to trigger your intestines to have a bowel movement.  Exercise regularly and maintain a healthy weight.  Weight loss and exercise  will decrease pressure on your bladder and help you control your leakage.   Use a catheter as directed  to help empty your bladder. A catheter is a tiny, plastic tube that is put into your bladder to drain your urine.   Go to behavior therapy as directed.  Behavior therapy may be used to help you learn to control your urge to urinate.    Weight Management   Why it is important to manage your weight:  Being overweight increases your risk of health conditions such as heart disease, high blood pressure, type 2 diabetes, and certain types of cancer. It can also increase your risk for osteoarthritis, sleep apnea, and other respiratory problems. Aim for a slow, steady weight loss. Even a small amount of weight loss can lower your risk of health problems.  How to lose weight safely:  A safe and healthy way to lose weight is to eat fewer calories and get regular exercise. You can lose up about 1 pound a week by decreasing the number of calories you eat by 500 calories each day.   Healthy meal plan for weight management:  A healthy meal plan includes a variety of foods, contains fewer calories, and helps you stay healthy. A healthy meal plan includes the following:  Eat whole-grain foods more often.  A healthy meal plan should contain fiber. Fiber is the part of grains, fruits, and vegetables that is not broken down by your body. Whole-grain foods are healthy and provide extra fiber in your diet. Some examples of whole-grain foods are whole-wheat breads and pastas, oatmeal, brown rice, and bulgur.  Eat a variety of vegetables every day.  Include dark, leafy greens such as spinach, kale, bassam greens, and mustard greens. Eat yellow and orange vegetables such as carrots, sweet potatoes, and winter squash.   Eat a variety of fruits every day.  Choose fresh or canned fruit (canned in its own juice or light syrup) instead of juice. Fruit juice has very little or no fiber.  Eat low-fat dairy foods.  Drink fat-free (skim) milk or  "1% milk. Eat fat-free yogurt and low-fat cottage cheese. Try low-fat cheeses such as mozzarella and other reduced-fat cheeses.  Choose meat and other protein foods that are low in fat.  Choose beans or other legumes such as split peas or lentils. Choose fish, skinless poultry (chicken or turkey), or lean cuts of red meat (beef or pork). Before you cook meat or poultry, cut off any visible fat.   Use less fat and oil.  Try baking foods instead of frying them. Add less fat, such as margarine, sour cream, regular salad dressing and mayonnaise to foods. Eat fewer high-fat foods. Some examples of high-fat foods include french fries, doughnuts, ice cream, and cakes.  Eat fewer sweets.  Limit foods and drinks that are high in sugar. This includes candy, cookies, regular soda, and sweetened drinks.  Exercise:  Exercise at least 30 minutes per day on most days of the week. Some examples of exercise include walking, biking, dancing, and swimming. You can also fit in more physical activity by taking the stairs instead of the elevator or parking farther away from stores. Ask your healthcare provider about the best exercise plan for you.   Alcohol Use and Your Health    Drinking too much can harm your health.  Excessive alcohol use leads to about 88,000 death in the [place] each year, and shortens the life of those who diet by almost 30 years.  Further, excessive drinking cost the economy $249 billion in 2010.  Most excessive drinkers are not alcohol dependent.    Excessive alcohol use has immediate effects that increase the risk of many harmful health conditions.  These are most often the result of binge drinking.  Over time, excessive alcohol use can lead to the development of chronic diseases and other series health problems.    What is considered a \"drink\"?        Excessive alcohol use includes:  Binge Drinking: For women, 4 or more drinks consumed on one occasion. For men, 5 or more drinks consumed on one " occasion.  Heavy Drinking: For women, 8 or more drinks per week. For men, 15 or more drinks per week  Any alcohol used by pregnant women  Any alcohol used by those under the age of 21 years    If you choose to drink, do so in moderation:  Do not drink at all if you are under the age of 21, or if you are or may be pregnant, or have health problems that could be made worse by drinking.  For women, up to 1 drink per day  For men, up to 2 drinks a day    No one should begin drinking or drink more frequently based on potential health benefits    Short-Term Health Risks:  Injuries: motor vehicle crashes, falls, drownings, burns  Violence: homicide, suicide, sexual assault, intimate partner violence  Alcohol poisoning  Reproductive health: risky sexual behaviors, unintended prengnacy, sexually transmitted diseases, miscarriage, stillbirth, fetal alcohol syndrome    Long-Term Health Risks:  Chronic diseases: high blood pressure, heart disease, stroke, liver disease, digestive problems  Cancers: breast, mouth and throat, liver, colon  Learning and memory problems: dementia, poor school performance  Mental health: depression, anxiety, insomnia  Social problems: lost productivity, family problems, unemployment  Alcohol dependence    For support and more information:  Substance Abuse and Mental Health Services Administration  [address]  [place] [address]  Web Address: http://www.samhsa.gov    Alcoholics Anonymous        Web Address: http://www.aa.org    https://www.cdc.gov/alcohol/fact-sheets/alcohol-use.htm     © Copyright [employer] 2018 Information is for End User's use only and may not be sold, redistributed or otherwise used for commercial purposes. All illustrations and images included in CareNotes® are the copyrighted property of [employer] or [employer]

## 2024-04-04 NOTE — PROGRESS NOTES
Assessment and Plan:     Problem List Items Addressed This Visit       Mild vitamin D deficiency    Relevant Orders    Vitamin D 25 hydroxy    Other hyperlipidemia    Relevant Orders    Lipid Panel with Direct LDL reflex     Other Visit Diagnoses       Medicare annual wellness visit, subsequent    -  Primary    Screen for colon cancer        Relevant Orders    Ambulatory Referral to Gastroenterology    Encounter for screening mammogram for malignant neoplasm of breast        Relevant Orders    Mammo screening bilateral w 3d & cad    Elevated blood pressure reading in office without diagnosis of hypertension        Relevant Orders    CBC and differential    Comprehensive metabolic panel    TSH, 3rd generation with Free T4 reflex    Anxiety disorder, unspecified type        Relevant Orders    TSH, 3rd generation with Free T4 reflex    Impaired fasting blood sugar        Relevant Orders    Hemoglobin A1C    Left hip pain        Relevant Orders    Ambulatory Referral to Orthopedic Surgery            Depression Screening and Follow-up Plan: Patient was screened for depression during today's encounter. They screened negative with a PHQ-2 score of 0.    Urinary Incontinence Plan of Care: counseling topics discussed: practice Kegel (pelvic floor strengthening) exercises, limit alcohol, caffeine, spicy foods, and acidic foods and limiting fluid intake 3-4 hours before bed.     Preventive health issues were discussed with patient, and age appropriate screening tests were ordered as noted in patient's After Visit Summary.  Personalized health advice and appropriate referrals for health education or preventive services given if needed, as noted in patient's After Visit Summary.     History of Present Illness:     Patient presents for a Medicare Wellness Visit    Patient is here for routine follow up, reviewed chronic medical problems, ordered labs for next visit including CBC CMP TSH A1C LIPID. Reviewed labs for this  visit.    Elevated blood pressure here in the office.  Usually controlled at home.  Continue to monitor.    Hyperlipidemia noted, discussed diet and exercise.  Has been eating more sweets cakes.  Discussed cutting down.  She did lose some weight.    She reports left hip pain, feels like a pinch, comes and goes, worsens with walking movements, feels better when she is laying down.  Was diagnosed with bursitis in the past.  Will have her see orthopedics.    For mammogram and colonoscopy.     Patient Care Team:  Gómez Peralta MD as PCP - General (Internal Medicine)  MARIA TERESA Alfred     Review of Systems:     Review of Systems   Constitutional:  Negative for chills and fever.   HENT:  Negative for ear pain and sore throat.    Eyes:  Negative for pain and visual disturbance.   Respiratory:  Negative for cough and shortness of breath.    Cardiovascular:  Negative for chest pain and palpitations.   Gastrointestinal:  Negative for abdominal pain and vomiting.   Genitourinary:  Negative for dysuria and hematuria.   Musculoskeletal:  Positive for arthralgias. Negative for back pain.        Left hip pain   Skin:  Negative for color change and rash.   Neurological:  Negative for seizures and syncope.   All other systems reviewed and are negative.       Problem List:     Patient Active Problem List   Diagnosis   • Mild vitamin D deficiency   • Osteopenia of neck of femur   • Other hyperlipidemia   • Elevated HDL   • Elevated LDL cholesterol level      Past Medical and Surgical History:     Past Medical History:   Diagnosis Date   • Arthritis    • Vocal cord nodules     removed     Past Surgical History:   Procedure Laterality Date   •  SECTION     • LARYNGOSCOPY      with stripping of vocal cords      Family History:     Family History   Problem Relation Age of Onset   • Lung cancer Mother    • Breast cancer Mother 45   • Diabetes type I Mother    • Lung cancer Father    • Cancer Father    • Diabetes type I Father     • Diabetes Daughter         mellitus   • Diabetes Son         mellitus   • Mental illness Son         Recently had a change in medication   • No Known Problems Sister    • No Known Problems Maternal Grandmother    • No Known Problems Paternal Grandmother    • No Known Problems Daughter    • Stroke Son         Recently experienced another stroke   • Diabetes Son       Social History:     Social History     Socioeconomic History   • Marital status: /Civil Union     Spouse name: None   • Number of children: None   • Years of education: None   • Highest education level: None   Occupational History   • None   Tobacco Use   • Smoking status: Former     Current packs/day: 0.00     Average packs/day: 0.5 packs/day for 30.0 years (15.0 ttl pk-yrs)     Types: Cigarettes     Start date: 1971     Quit date: 2001     Years since quittin.2   • Smokeless tobacco: Never   Vaping Use   • Vaping status: Never Used   Substance and Sexual Activity   • Alcohol use: Yes     Alcohol/week: 2.0 standard drinks of alcohol     Types: 2 Glasses of wine per week     Comment: weekly   • Drug use: No   • Sexual activity: Not Currently     Birth control/protection: Post-menopausal   Other Topics Concern   • None   Social History Narrative    Always uses seat belt    Daily caffeine consumption, 4-5 servings a day     Social Determinants of Health     Financial Resource Strain: Low Risk  (3/6/2023)    Overall Financial Resource Strain (CARDIA)    • Difficulty of Paying Living Expenses: Not very hard   Food Insecurity: No Food Insecurity (2024)    Hunger Vital Sign    • Worried About Running Out of Food in the Last Year: Never true    • Ran Out of Food in the Last Year: Never true   Transportation Needs: No Transportation Needs (2024)    PRAPARE - Transportation    • Lack of Transportation (Medical): No    • Lack of Transportation (Non-Medical): No   Physical Activity: Not on file   Stress: Not on file   Social  Connections: Not on file   Intimate Partner Violence: Not on file   Housing Stability: Low Risk  (4/4/2024)    Housing Stability Vital Sign    • Unable to Pay for Housing in the Last Year: No    • Number of Places Lived in the Last Year: 1    • Unstable Housing in the Last Year: No      Medications and Allergies:     Current Outpatient Medications   Medication Sig Dispense Refill   • multivitamin-minerals (CENTRUM) tablet Take by mouth      • Vitamin D-Vitamin K (VITAMIN K2-VITAMIN D3 PO) Take by mouth       No current facility-administered medications for this visit.     No Known Allergies   Immunizations:     Immunization History   Administered Date(s) Administered   • Tdap 08/03/2018      Health Maintenance:         Topic Date Due   • Cervical Cancer Screening  06/11/2022   • Colorectal Cancer Screening  04/29/2023   • Breast Cancer Screening: Mammogram  05/04/2024   • Hepatitis C Screening  Completed         Topic Date Due   • Pneumococcal Vaccine: 65+ Years (1 of 1 - PCV) Never done   • Influenza Vaccine (1) Never done   • COVID-19 Vaccine (1 - 2023-24 season) Never done      Medicare Screening Tests and Risk Assessments:     Sheryl is here for her Subsequent Wellness visit. Last Medicare Wellness visit information reviewed, patient interviewed and updates made to the record today.      Health Risk Assessment:   Patient rates overall health as very good. Patient feels that their physical health rating is same. Patient is very satisfied with their life. Eyesight was rated as same. Hearing was rated as same. Patient feels that their emotional and mental health rating is same. Patients states they are never, rarely angry. Patient states they are never, rarely unusually tired/fatigued. Pain experienced in the last 7 days has been some. Patient's pain rating has been 3/10. Patient states that she has experienced no weight loss or gain in last 6 months.     Depression Screening:   PHQ-2 Score: 0      Fall Risk  Screening:   In the past year, patient has experienced: no history of falling in past year      Urinary Incontinence Screening:   Patient has leaked urine accidently in the last six months.     Home Safety:  Patient does not have trouble with stairs inside or outside of their home. Patient has working smoke alarms and has working carbon monoxide detector. Home safety hazards include: none.     Nutrition:   Current diet is Regular and Limited junk food.     Medications:   Patient is currently taking over-the-counter supplements. OTC medications include: see medication list. Patient is able to manage medications.     Activities of Daily Living (ADLs)/Instrumental Activities of Daily Living (IADLs):   Walk and transfer into and out of bed and chair?: Yes  Dress and groom yourself?: Yes    Bathe or shower yourself?: Yes    Feed yourself? Yes  Do your laundry/housekeeping?: Yes  Manage your money, pay your bills and track your expenses?: Yes  Make your own meals?: Yes    Do your own shopping?: Yes    Previous Hospitalizations:   Any hospitalizations or ED visits within the last 12 months?: No      Advance Care Planning:   Living will: No    Durable POA for healthcare: No    Advanced directive: No      Cognitive Screening:   Provider or family/friend/caregiver concerned regarding cognition?: No    PREVENTIVE SCREENINGS      Cardiovascular Screening:    General: History Lipid Disorder and Screening Current      Diabetes Screening:     General: Screening Current      Colorectal Cancer Screening:     General: Risks and Benefits Discussed    Due for: Colonoscopy - Low Risk      Breast Cancer Screening:     General: Screening Current      Cervical Cancer Screening:    General: Screening Not Indicated      Osteoporosis Screening:    General: Risks and Benefits Discussed    Due for: DXA Axial      Abdominal Aortic Aneurysm (AAA) Screening:        General: Screening Not Indicated      Lung Cancer Screening:     General: Screening  Not Indicated      Hepatitis C Screening:    General: Screening Current    Screening, Brief Intervention, and Referral to Treatment (SBIRT)    Screening  Typical number of drinks in a day: 0  Typical number of drinks in a week: 2  Interpretation: Low risk drinking behavior.    AUDIT-C Screenin) How often did you have a drink containing alcohol in the past year? 2 to 3 times a week  2) How many drinks did you have on a typical day when you were drinking in the past year? 0  3) How often did you have 6 or more drinks on one occasion in the past year? never    AUDIT-C Score: 3  Interpretation: Score 3-12 (female): POSITIVE screen for alcohol misuse    AUDIT Screenin) How often during the last year have you found that you were not able to stop drinking once you had started? 0 - never  5) How often during the last year have you failed to do what was normally expected from you because of drinking? 0 - never  6) How often during the last year have you needed a first drink in the morning to get yourself going after a heavy drinking session? 0 - never  7) How often during the last year have you had a feeling of guilt or remorse after drinking? 0 - never  8) How often during the last year have you been unable to remember what happened the night before because you had been drinking? 0 - never  9) Have you or someone else been injured as a result of your drinking? 0 - no  10) Has a relative or friend or a doctor or another health worker been concerned about your drinking or suggested you cut down? 0 - no    AUDIT Score: 3  Interpretation: Low risk alcohol consumption    Single Item Drug Screening:  How often have you used an illegal drug (including marijuana) or a prescription medication for non-medical reasons in the past year? never    Single Item Drug Screen Score: 0  Interpretation: Negative screen for possible drug use disorder    Brief Intervention  Alcohol & drug use screenings were reviewed. No concerns  "regarding substance use disorder identified.     Other Counseling Topics:   Car/seat belt/driving safety, skin self-exam, sunscreen and calcium and vitamin D intake and regular weightbearing exercise.     No results found.     Physical Exam:     /88 (BP Location: Right arm, Patient Position: Sitting, Cuff Size: Standard)   Pulse 82   Ht 5' 2\" (1.575 m)   Wt 66.1 kg (145 lb 12.8 oz)   LMP  (LMP Unknown)   SpO2 99%   BMI 26.67 kg/m²     Physical Exam  Vitals and nursing note reviewed.   Constitutional:       General: She is not in acute distress.     Appearance: Normal appearance. She is well-developed.   HENT:      Head: Normocephalic and atraumatic.   Eyes:      Conjunctiva/sclera: Conjunctivae normal.   Cardiovascular:      Rate and Rhythm: Normal rate and regular rhythm.      Heart sounds: Normal heart sounds. No murmur heard.  Pulmonary:      Effort: Pulmonary effort is normal. No respiratory distress.      Breath sounds: Normal breath sounds.   Abdominal:      Tenderness: There is no abdominal tenderness.   Musculoskeletal:         General: No swelling.      Cervical back: Neck supple.   Skin:     General: Skin is warm and dry.      Capillary Refill: Capillary refill takes less than 2 seconds.   Neurological:      General: No focal deficit present.      Mental Status: She is alert and oriented to person, place, and time. Mental status is at baseline.   Psychiatric:         Mood and Affect: Mood normal.        Gómez Peralta MD  "

## 2024-04-11 ENCOUNTER — TELEPHONE (OUTPATIENT)
Age: 69
End: 2024-04-11

## 2024-04-11 ENCOUNTER — PREP FOR PROCEDURE (OUTPATIENT)
Age: 69
End: 2024-04-11

## 2024-04-11 DIAGNOSIS — Z12.11 SCREENING FOR COLON CANCER: Primary | ICD-10-CM

## 2024-04-11 NOTE — TELEPHONE ENCOUNTER
Scheduled date of colonoscopy (as of today): 5/23/24  Physician performing colonoscopy: GURJIT  Location of colonoscopy: [facility]  Bowel prep reviewed with patient: cas/yevgeniy  Instructions to [facility]     04/11/24  Screened by: Leeann Myles    Referring Provider     Pre- Screening:     There is no height or weight on file to calculate BMI. 26.67  Has patient been referred for a routine screening Colonoscopy? yes  Is the patient between 45-75 years old? yes      Previous Colonoscopy yes   If yes:    Date: approx 10 yrs    Facility: [facility]     Reason: screening    Does the patient want to see a Gastroenterologist prior to their procedure OR are they having any GI symptoms? no    Has the patient been hospitalized or had abdominal surgery in the past 6 months? no    Does the patient use supplemental oxygen? no    Does the patient take Coumadin, Lovenox, Plavix, Elliquis, Xarelto, or other blood thinning medication? no    Has the patient had a stroke, cardiac event, or stent placed in the past year? no      If patient is between 45yrs - 49yrs, please advise patient that we will have to confirm benefits & coverage with their insurance company for a routine screening colonoscopy.

## 2024-04-30 ENCOUNTER — OFFICE VISIT (OUTPATIENT)
Dept: OBGYN CLINIC | Facility: CLINIC | Age: 69
End: 2024-04-30
Payer: COMMERCIAL

## 2024-04-30 ENCOUNTER — APPOINTMENT (OUTPATIENT)
Dept: RADIOLOGY | Facility: CLINIC | Age: 69
End: 2024-04-30
Payer: COMMERCIAL

## 2024-04-30 VITALS
TEMPERATURE: 98 F | OXYGEN SATURATION: 98 % | DIASTOLIC BLOOD PRESSURE: 95 MMHG | RESPIRATION RATE: 18 BRPM | WEIGHT: 145 LBS | SYSTOLIC BLOOD PRESSURE: 156 MMHG | BODY MASS INDEX: 25.69 KG/M2 | HEART RATE: 79 BPM | HEIGHT: 63 IN

## 2024-04-30 DIAGNOSIS — M25.552 LEFT HIP PAIN: ICD-10-CM

## 2024-04-30 DIAGNOSIS — M16.12 PRIMARY OSTEOARTHRITIS OF LEFT HIP: Primary | ICD-10-CM

## 2024-04-30 PROCEDURE — 73502 X-RAY EXAM HIP UNI 2-3 VIEWS: CPT

## 2024-04-30 PROCEDURE — 1160F RVW MEDS BY RX/DR IN RCRD: CPT | Performed by: FAMILY MEDICINE

## 2024-04-30 PROCEDURE — 1159F MED LIST DOCD IN RCRD: CPT | Performed by: FAMILY MEDICINE

## 2024-04-30 PROCEDURE — 99204 OFFICE O/P NEW MOD 45 MIN: CPT | Performed by: FAMILY MEDICINE

## 2024-04-30 RX ORDER — MELOXICAM 15 MG/1
15 TABLET ORAL DAILY
Qty: 30 TABLET | Refills: 0 | Status: SHIPPED | OUTPATIENT
Start: 2024-04-30

## 2024-04-30 NOTE — PROGRESS NOTES
"    Chief Complaint   Patient presents with    Left Hip - Pain, Locking       Sheryl Nichols is a 68 y.o. female complains of left hip pain. Onset of the symptoms was several months ago.  Mechanism of injury:  none reported . Aggravating factors: walking  and weight bearing. Treatment to date: ice and rest. Symptoms have progressed to a point and plateaued.    The following portions of the patient's history were reviewed and updated as appropriate: allergies, current medications, past family history, past medical history, past social history, past surgical history and problem list.    Occupation:    Review of Systems   Constitutional: Negative for fever.   HENT: Negative for dental problem and headaches.    Eyes: Negative for vision loss.   Respiratory: Negative for cough and shortness of breath.    Cardiovascular: Negative for leg swelling and palpitations.   Gastrointestinal: Negative for constipation and diarrhea.   Genitourinary: Negative for bladder incontinence and difficulty urinating.   Musculoskeletal: Negative for back pain and difficulty walking.   Skin: Negative for rash and ulcer.   Neurological: Negative for dizziness and headaches.   Hem/Lymph/Immuno: Negative for blood clots. Does not bruise/bleed easily.   Psychiatric/Behavioral: Negative for confusion.         Objective:  /95   Pulse 79   Temp 98 °F (36.7 °C) (Temporal)   Resp 18   Ht 5' 2.5\" (1.588 m)   Wt 65.8 kg (145 lb)   LMP  (LMP Unknown)   SpO2 98%   BMI 26.10 kg/m²     Skin: no rashes, lesions, skin discolorations, lacerations  Vasculature: normal popliteal and pedal pulse, normal skin color, normal capillary refill in extremity, no lower extremity edema  Neurologic: Neurologic exam is normal throughout lower extremities, Awake, alert, and oriented x3, no apparent distress.    Neuro: no weakness in the L4-S1 nerve distribution  Musculoskeletal:  Inspection: no observable abnormalities  Palpation no tenderness to " palpation over greater trochanter  ROM: Full flexion and extension of the hip. full internal and external rotation  Special tests: + FADIR and MARY test          Assessment/Plan:  1. Primary osteoarthritis of left hip      Imaging independently reviewed and discussed with patient.  Degenerative changes noted, no acute fractures appreciated.  Follow-up official reading.  We discussed the nature of left hip OA  Recommending formal PT- Rx provided for PT  Begin mobic 15 mg as needed for pain relief  Discussed role for csi IA left hip injection under US guidance  Follow up in 6-8 weeks.      - XR hip/pelv 2-3 vws left if performed; Future  - Ambulatory Referral to Orthopedic Surgery  - Ambulatory Referral to Physical Therapy; Future  - meloxicam (Mobic) 15 mg tablet; Take 1 tablet (15 mg total) by mouth daily  Dispense: 30 tablet; Refill: 0

## 2024-05-29 DIAGNOSIS — M16.12 PRIMARY OSTEOARTHRITIS OF LEFT HIP: ICD-10-CM

## 2024-05-29 RX ORDER — MELOXICAM 15 MG/1
15 TABLET ORAL DAILY
Qty: 30 TABLET | Refills: 5 | Status: SHIPPED | OUTPATIENT
Start: 2024-05-29

## 2024-07-18 ENCOUNTER — EVALUATION (OUTPATIENT)
Dept: PHYSICAL THERAPY | Facility: CLINIC | Age: 69
End: 2024-07-18
Payer: COMMERCIAL

## 2024-07-18 DIAGNOSIS — M16.12 PRIMARY OSTEOARTHRITIS OF LEFT HIP: Primary | ICD-10-CM

## 2024-07-18 PROCEDURE — 97112 NEUROMUSCULAR REEDUCATION: CPT

## 2024-07-18 PROCEDURE — 97161 PT EVAL LOW COMPLEX 20 MIN: CPT

## 2024-07-18 PROCEDURE — 97110 THERAPEUTIC EXERCISES: CPT

## 2024-07-18 NOTE — PROGRESS NOTES
PT Evaluation     Today's date: 2024  Patient name: Sheryl Nichols  : 1955  MRN: [id number]  Referring provider: Zenon Rae DO  Dx:   Encounter Diagnosis     ICD-10-CM    1. Primary osteoarthritis of left hip  M16.12           Start Time: 1100  Stop Time: 1145  Total time in clinic (min): 45 minutes    Assessment  Impairments: abnormal gait, abnormal or restricted ROM, impaired physical strength, lacks appropriate home exercise program and pain with function  Functional limitations: stair negotiation, ambulation, sleeping  Symptom irritability: moderate    Assessment details: Pt is a 67 y/o female presenting to outpatient PT with a diagnosis of left hip OA. Upon examination, pt presents with mild limitations in left hip mobility, decreased hip strength, and antalgic gait pattern. Functionally, these impairments have led to difficulty with stair negotiation, ambulation, and sleeping, which limits her ability to perform her usual ADLs and household tasks. Pt has good rehab potential to achieve stated goals and will benefit from skilled PT services to address above limitations in order to improve functional activity tolerance for ADLs and household tasks.    Understanding of Dx/Px/POC: good     Prognosis: good    Goals  STG - 4 weeks  1. Pt able to sleep in preferred position without disturbance due to pain.  2. Hip strength improved to at least 4+/5 to improve tolerance for ADLs.    LTG - 8 weeks  1. Pt able to ambulate community distances with decreased antalgic gait pattern.  2. FOTO score will improve by at least 10 points to demonstrate improved functional abilities.     Plan  Patient would benefit from: PT eval and skilled physical therapy  Planned modality interventions: cryotherapy and unattended electrical stimulation    Planned therapy interventions: IASTM, joint mobilization, kinesiology taping, manual therapy, neuromuscular re-education, patient/caregiver education, strengthening,  stretching, therapeutic activities, therapeutic exercise, home exercise program, graded motor, graded exercise and functional ROM exercises    Frequency: 2x week  Duration in weeks: 8  Plan of Care beginning date: 2024  Plan of Care expiration date: 2024  Treatment plan discussed with: patient        Subjective Evaluation    History of Present Illness  Date of onset: 2024  Mechanism of injury: Pt current complaint is left hip pain that started about 6 months. Saw the ortho and medications were prescribed. She takes it sometimes and it's only effective sometimes. She notes difficulty with walking, getting in and out of the car, sleeping. She uses ice and heat to help manage symptoms.   Patient Goals  Patient goals for therapy: decreased pain, increased strength and independence with ADLs/IADLs    Pain  Current pain ratin  At best pain ratin  At worst pain ratin  Quality: sharp and dull ache          Objective     Observations     Additional Observation Details  Mild antalgic gait pattern    Palpation     Additional Palpation Details  No tenderness noted based on palpation    Active Range of Motion   Left Hip   Flexion: 110 degrees     Right Hip   Flexion: 110 degrees     Additional Active Range of Motion Details  Limited hip IR/ER on L    Strength/Myotome Testing     Left Hip   Planes of Motion   Flexion: 3+  Extension: 3+  Abduction: 3+    Right Hip   Planes of Motion   Flexion: 4+  Extension: 4  Abduction: 4    Left Knee   Normal strength    Right Knee   Normal strength    Tests     Left Hip   Positive MARY, FADIR and scour.              Precautions: Osteopenia    POC expires Unit limit Auth Expiration date PT/OT + Visit Limit?    N/A Pending BOMN                 Visit/Unit Tracking  AUTH Status:  Date               Pending Used 1               Remaining                    FOTO      Access Code: [email or web address]     Manuals                                                               "   Neuro Re-Ed             Pt education on HEP, POC, IE findings 8'                                                                                          Ther Ex             HS str w/ strap L 3x30\"            SLR 2x10 3\"            Bridge 2x10 3\"                                                                             Ther Activity                                       Gait Training                                       Modalities                                            "

## 2024-07-22 ENCOUNTER — OFFICE VISIT (OUTPATIENT)
Dept: PHYSICAL THERAPY | Facility: CLINIC | Age: 69
End: 2024-07-22
Payer: COMMERCIAL

## 2024-07-22 DIAGNOSIS — M16.12 PRIMARY OSTEOARTHRITIS OF LEFT HIP: Primary | ICD-10-CM

## 2024-07-22 PROCEDURE — 97140 MANUAL THERAPY 1/> REGIONS: CPT

## 2024-07-22 PROCEDURE — 97110 THERAPEUTIC EXERCISES: CPT

## 2024-07-22 PROCEDURE — 97530 THERAPEUTIC ACTIVITIES: CPT

## 2024-07-22 NOTE — PROGRESS NOTES
"Daily Note     Today's date: 2024  Patient name: Sheryl Nichols  : 1955  MRN: [id number]  Referring provider: Zenon Rae DO  Dx:   Encounter Diagnosis     ICD-10-CM    1. Primary osteoarthritis of left hip  M16.12           Start Time: 935  Stop Time: 1015  Total time in clinic (min): 40 minutes    Subjective: Pt reports she was a little sore over the weekend but thinks it could be due to going up to her attic and crawling around.       Objective: See treatment diary below      Assessment: Tolerated treatment well. Patient demonstrated fatigue post treatment, exhibited good technique with therapeutic exercises, and would benefit from continued PT. The patient was provided with cuing and demonstration with initiation of program to ensure proper form and technique with exercises. She was challenged by program but noted an overall reduction in hip stiffness following session.       Plan: Progress treatment as tolerated.       Precautions: Osteopenia    POC expires Unit limit Auth Expiration date PT/OT + Visit Limit?    N/A N/A BOMN                 Visit/Unit Tracking  AUTH Status:  Date              BOMN Used 1 2              Remaining                    FOTO      Access Code: [email or web address]     Manuals            Hip belt mobs  CG           L LAD  CG                                     Neuro Re-Ed             Pt education  8'                                                                                          Ther Ex             HS str w/ strap L 3x30\" 3x30\"           SLR L 2x10 3\" 2x10 3\"           Bridge 2x10 3\" 2x10 3\"           S/L hip abd B  2x10 ea           Sidesteps  nv           Reverse clamshell  nv           Rec bik                          Ther Activity             STS  2x10           Step up  6\" x20           Step down  nv           Gait Training                                       Modalities                                            "

## 2024-07-24 ENCOUNTER — OFFICE VISIT (OUTPATIENT)
Dept: PHYSICAL THERAPY | Facility: CLINIC | Age: 69
End: 2024-07-24
Payer: COMMERCIAL

## 2024-07-24 DIAGNOSIS — M16.12 PRIMARY OSTEOARTHRITIS OF LEFT HIP: Primary | ICD-10-CM

## 2024-07-24 PROCEDURE — 97110 THERAPEUTIC EXERCISES: CPT

## 2024-07-24 PROCEDURE — 97530 THERAPEUTIC ACTIVITIES: CPT

## 2024-07-24 NOTE — PROGRESS NOTES
"Daily Note     Today's date: 2024  Patient name: Sheryl Nichols  : 1955  MRN: [id number]  Referring provider: Zenon Rae DO  Dx:   Encounter Diagnosis     ICD-10-CM    1. Primary osteoarthritis of left hip  M16.12           Start Time: 0930  Stop Time: 1015  Total time in clinic (min): 45 minutes    Subjective: Pt reports she felt really good after last session but then did too much around her house and was hurting.       Objective: See treatment diary below      Assessment: Tolerated treatment well. Patient demonstrated fatigue post treatment, exhibited good technique with therapeutic exercises, and would benefit from continued PT. Continued to progress program with addition of step downs to continue improving functional strength and stability. She was challenged by program but denied any new or increased painful symptoms during or following session.       Plan: Progress treatment as tolerated.       Precautions: Osteopenia    POC expires Unit limit Auth Expiration date PT/OT + Visit Limit?    N/A N/A BOMN                 Visit/Unit Tracking  AUTH Status:  Date             BOMN Used 1 2 3             Remaining                    FOTO      Access Code: [email or web address]     Manuals           Hip belt mobs  CG CG          L LAD  CG CG                                    Neuro Re-Ed             Pt education  8'                                                                                          Ther Ex             HS str w/ strap L 3x30\" 3x30\" 3x30\"          SLR L 2x10 3\" 2x10 3\" 2x10 3\"          Bridge 2x10 3\" 2x10 3\" 2x10 3\"          S/L hip abd B  2x10 ea 2x10 ea          Sidesteps  nv           Reverse clamshell  nv Grn 2x10 ea          Rec bike for ROM  5' 5'                       Ther Activity             STS  2x10 2x10          Step up  6\" x20 6\" x20          Step down  nv 6\" x10          Gait Training                                       Modalities              "

## 2024-07-29 ENCOUNTER — OFFICE VISIT (OUTPATIENT)
Dept: PHYSICAL THERAPY | Facility: CLINIC | Age: 69
End: 2024-07-29
Payer: COMMERCIAL

## 2024-07-29 DIAGNOSIS — M16.12 PRIMARY OSTEOARTHRITIS OF LEFT HIP: Primary | ICD-10-CM

## 2024-07-29 PROCEDURE — 97110 THERAPEUTIC EXERCISES: CPT

## 2024-07-29 PROCEDURE — 97530 THERAPEUTIC ACTIVITIES: CPT

## 2024-07-29 NOTE — PROGRESS NOTES
"Daily Note     Today's date: 2024  Patient name: Sheryl Nichols  : 1955  MRN: [id number]  Referring provider: Zenon Rae DO  Dx:   Encounter Diagnosis     ICD-10-CM    1. Primary osteoarthritis of left hip  M16.12           Start Time: 1230  Stop Time: 1316  Total time in clinic (min): 46 minutes    Subjective: Pt reports her hip feels better when she does her exercises.      Objective: See treatment diary below      Assessment: Tolerated treatment well. Patient demonstrated fatigue post treatment, exhibited good technique with therapeutic exercises, and would benefit from continued PT. Added weight to sit to stand this session to continue improving functional strength with no painful symptoms noted. Pt continues to respond well to tx interventions with noted reduction in hip stiffness at end of session.       Plan: Progress treatment as tolerated.       Precautions: Osteopenia    POC expires Unit limit Auth Expiration date PT/OT + Visit Limit?    N/A N/A BOMN                 Visit/Unit Tracking  AUTH Status:  Date            BOMN Used 1 2 3 4            Remaining                    FOTO      Access Code: [email or web address]     Manuals          Hip belt mobs  CG CG          L LAD  CG CG CG                                   Neuro Re-Ed             Pt education  8'                                                                                          Ther Ex             HS str w/ strap L 3x30\" 3x30\" 3x30\" 3x30\"         SLR L 2x10 3\" 2x10 3\" 2x10 3\" 2x10 3\"         Bridge 2x10 3\" 2x10 3\" 2x10 3\" 2x10 3\"         S/L hip abd B  2x10 ea 2x10 ea 2x10 ea         Sidesteps  nv           Reverse clamshell  nv Grn 2x10 ea Grn 2x10 ea         Rec bike for ROM  5' 5' 5'                      Ther Activity             STS  2x10 2x10 10# 2x10         Step up  6\" x20 6\" x20 6\" x20         Step down  nv 6\" x10 6\" x15         Gait Training                                     "   Modalities

## 2024-08-01 ENCOUNTER — OFFICE VISIT (OUTPATIENT)
Dept: PHYSICAL THERAPY | Facility: CLINIC | Age: 69
End: 2024-08-01
Payer: COMMERCIAL

## 2024-08-01 DIAGNOSIS — M16.12 PRIMARY OSTEOARTHRITIS OF LEFT HIP: Primary | ICD-10-CM

## 2024-08-01 PROCEDURE — 97530 THERAPEUTIC ACTIVITIES: CPT

## 2024-08-01 PROCEDURE — 97110 THERAPEUTIC EXERCISES: CPT

## 2024-08-01 NOTE — PROGRESS NOTES
"Daily Note     Today's date: 2024  Patient name: Sheryl Nichols  : 1955  MRN: [id number]  Referring provider: Zenon Rae DO  Dx:   Encounter Diagnosis     ICD-10-CM    1. Primary osteoarthritis of left hip  M16.12           Start Time: 0930  Stop Time: 1015  Total time in clinic (min): 45 minutes    Subjective: Pt reports she felt good the day after therapy but did too much yesterday and is sore.       Objective: See treatment diary below      Assessment: Tolerated treatment well. Patient demonstrated fatigue post treatment, exhibited good technique with therapeutic exercises, and would benefit from continued PT. Pt continues to progress well through program with noted reduction in stiffness and pain at end of treatment session. She is most challenged by sit to stand but demonstrates improved eccentric control with less verbal cuing from therapist.       Plan: Progress treatment as tolerated.       Precautions: Osteopenia    POC expires Unit limit Auth Expiration date PT/OT + Visit Limit?    N/A N/A BOMN                 Visit/Unit Tracking  AUTH Status:  Date  - FOTO          BOMN Used 1 2 3 4 5           Remaining                    FOTO - done       Access Code: [email or web address]     Manuals         Hip belt mobs  CG CG          L LAD  CG CG CG CG                                  Neuro Re-Ed             Pt education  8'                                                                                          Ther Ex             HS str w/ strap L 3x30\" 3x30\" 3x30\" 3x30\" 3x30\"        SLR L 2x10 3\" 2x10 3\" 2x10 3\" 2x10 3\" 2x10 3\" 2#       Bridge 2x10 3\" 2x10 3\" 2x10 3\" 2x10 3\" 2x10 3\"        S/L hip abd B  2x10 ea 2x10 ea 2x10 ea 2x10 ea 2#       Reverse clamshell  nv Grn 2x10 ea Grn 2x10 ea Grn 2x10 ea        Rec bike for ROM  5' 5' 5' 5'        Leg press     nv        Ther Activity             STS  2x10 2x10 10# 2x10 10# 2x10        Step up  6\" x20 6\" " "x20 6\" x20 6\" x20        Step down  nv 6\" x10 6\" x15 6\" x20        Gait Training                                       Modalities                                                  "

## 2024-08-09 ENCOUNTER — TELEPHONE (OUTPATIENT)
Age: 69
End: 2024-08-09

## 2024-08-09 ENCOUNTER — APPOINTMENT (OUTPATIENT)
Dept: PHYSICAL THERAPY | Facility: CLINIC | Age: 69
End: 2024-08-09
Payer: COMMERCIAL

## 2024-08-09 NOTE — TELEPHONE ENCOUNTER
Provided phone number for [facility] [place].

## 2024-08-14 ENCOUNTER — OFFICE VISIT (OUTPATIENT)
Dept: PHYSICAL THERAPY | Facility: CLINIC | Age: 69
End: 2024-08-14
Payer: COMMERCIAL

## 2024-08-14 DIAGNOSIS — M16.12 PRIMARY OSTEOARTHRITIS OF LEFT HIP: Primary | ICD-10-CM

## 2024-08-14 PROCEDURE — 97110 THERAPEUTIC EXERCISES: CPT

## 2024-08-14 PROCEDURE — 97530 THERAPEUTIC ACTIVITIES: CPT

## 2024-08-14 NOTE — PROGRESS NOTES
"Daily Note     Today's date: 2024  Patient name: Sheryl Nichols  : 1955  MRN: [id number]  Referring provider: Zenon Rae DO  Dx:   Encounter Diagnosis     ICD-10-CM    1. Primary osteoarthritis of left hip  M16.12           Start Time: 1100  Stop Time: 1145  Total time in clinic (min): 45 minutes    Subjective: Pt reports she did her HEP while away on vacation and is now having an easier time walking and going up and down stairs.       Objective: See treatment diary below      Assessment: Tolerated treatment well. Patient demonstrated fatigue post treatment, exhibited good technique with therapeutic exercises, and would benefit from continued PT. Increased reps and weight for multiple exercises as noted below to continue improving LE strength. She demonstrates less notable antalgic gait this session during and following activity.      Plan: Progress treatment as tolerated.       Precautions: Osteopenia    POC expires Unit limit Auth Expiration date PT/OT + Visit Limit?    N/A N/A BOMN                 Visit/Unit Tracking  AUTH Status:  Date  - FOTO          BOMN Used 1 2 3 4 5 6          Remaining                    FOTO - done       Access Code: [email or web address]     Manuals        Hip belt mobs  CG CG          L LAD  CG CG CG CG                                  Neuro Re-Ed             Pt education  8'                                                                                          Ther Ex             HS str w/ strap L 3x30\" 3x30\" 3x30\" 3x30\" 3x30\" 3x30\"       SLR L 2x10 3\" 2x10 3\" 2x10 3\" 2x10 3\" 2x10 3\" 2# 2x10 3\"       Bridge 2x10 3\" 2x10 3\" 2x10 3\" 2x10 3\" 2x10 3\" 3x10 3\"       S/L hip abd B  2x10 ea 2x10 ea 2x10 ea 2x10 ea 2# 2x10 ea       Reverse clamshell  nv Grn 2x10 ea Grn 2x10 ea Grn 2x10 ea Grn 3x10 ea       Rec bike for ROM  5' 5' 5' 5' 5'       Leg press     nv nv       Ther Activity             STS  2x10 2x10 10# 2x10 " "10# 2x10 10# 3x10       Step up  6\" x20 6\" x20 6\" x20 6\" x20 8\" x20       Step down  nv 6\" x10 6\" x15 6\" x20 8\" x20       Gait Training                                       Modalities                                                    "

## 2024-08-15 ENCOUNTER — VBI (OUTPATIENT)
Dept: ADMINISTRATIVE | Facility: OTHER | Age: 69
End: 2024-08-15

## 2024-08-15 NOTE — TELEPHONE ENCOUNTER
08/15/24 3:08 PM     Chart reviewed for CRC: Colonoscopy ; nothing is submitted to the patient's insurance at this time.     Tomasa Garcia [facility]

## 2024-08-16 ENCOUNTER — APPOINTMENT (OUTPATIENT)
Dept: PHYSICAL THERAPY | Facility: CLINIC | Age: 69
End: 2024-08-16
Payer: COMMERCIAL

## 2024-08-21 ENCOUNTER — OFFICE VISIT (OUTPATIENT)
Dept: PHYSICAL THERAPY | Facility: CLINIC | Age: 69
End: 2024-08-21
Payer: COMMERCIAL

## 2024-08-21 DIAGNOSIS — M16.12 PRIMARY OSTEOARTHRITIS OF LEFT HIP: Primary | ICD-10-CM

## 2024-08-21 PROCEDURE — 97110 THERAPEUTIC EXERCISES: CPT

## 2024-08-21 PROCEDURE — 97530 THERAPEUTIC ACTIVITIES: CPT

## 2024-08-21 NOTE — PROGRESS NOTES
"Daily Note     Today's date: 2024  Patient name: Sheryl Nichols  : 1955  MRN: [id number]  Referring provider: Zenon Rae DO  Dx:   Encounter Diagnosis     ICD-10-CM    1. Primary osteoarthritis of left hip  M16.12           Start Time: 1100  Stop Time: 1140  Total time in clinic (min): 40 minutes    Subjective: Pt reports she is a little stiff today but is now having an easier time       Objective: See treatment diary below      Assessment: Tolerated treatment well. Pt demonstrates good effort throughout session. Minimal curing given to facilitate proper exercise performance and technique. Pt reports fatigue in her hip but no increase in painful sx. Patient demonstrated fatigue post treatment, exhibited good technique with therapeutic exercises, and would benefit from continued PT      Plan: Progress treatment as tolerated.       Precautions: Osteopenia    POC expires Unit limit Auth Expiration date PT/OT + Visit Limit?    N/A N/A BOMN                 Visit/Unit Tracking  AUTH Status:  Date  - FOTO         BOMN Used 1 2 3 4 5 6 7         Remaining                    FOTO - done       Access Code: [email or web address]     Manuals       Hip belt mobs  CG CG          L LAD  CG CG CG CG                                  Neuro Re-Ed             Pt education  8'                                                                                          Ther Ex             HS str w/ strap L 3x30\" 3x30\" 3x30\" 3x30\" 3x30\" 3x30\" 3x30\"      SLR L 2x10 3\" 2x10 3\" 2x10 3\" 2x10 3\" 2x10 3\" 2# 2x10 3\" 2# 2x10 3\"      Bridge 2x10 3\" 2x10 3\" 2x10 3\" 2x10 3\" 2x10 3\" 3x10 3\" 3x10 3\"      S/L hip abd B  2x10 ea 2x10 ea 2x10 ea 2x10 ea 2# 2x10 ea 2# 2x10 ea      Reverse clamshell  nv Grn 2x10 ea Grn 2x10 ea Grn 2x10 ea Grn 3x10 ea Grn 3x10 ea      Rec bike for ROM  5' 5' 5' 5' 5' 5'      Leg press     nv nv       Ther Activity             STS  2x10 2x10 10# " "2x10 10# 2x10 10# 3x10 10# 3x10      Step up  6\" x20 6\" x20 6\" x20 6\" x20 8\" x20 8\" x20      Step down forward  nv 6\" x10 6\" x15 6\" x20 8\" x20 8\" x20      Gait Training                                       Modalities                                                      "

## 2024-08-22 ENCOUNTER — TELEPHONE (OUTPATIENT)
Dept: GASTROENTEROLOGY | Facility: CLINIC | Age: 69
End: 2024-08-22

## 2024-08-30 ENCOUNTER — OFFICE VISIT (OUTPATIENT)
Dept: PHYSICAL THERAPY | Facility: CLINIC | Age: 69
End: 2024-08-30
Payer: COMMERCIAL

## 2024-08-30 DIAGNOSIS — M16.12 PRIMARY OSTEOARTHRITIS OF LEFT HIP: Primary | ICD-10-CM

## 2024-08-30 PROCEDURE — 97110 THERAPEUTIC EXERCISES: CPT

## 2024-08-30 PROCEDURE — 97530 THERAPEUTIC ACTIVITIES: CPT

## 2024-08-30 NOTE — PROGRESS NOTES
"Daily Note     Today's date: 2024  Patient name: Sheryl Nichols  : 1955  MRN: [id number]  Referring provider: Zenon Rae DO  Dx:   Encounter Diagnosis     ICD-10-CM    1. Primary osteoarthritis of left hip  M16.12           Start Time: 1100  Stop Time: 1146  Total time in clinic (min): 46 minutes    Subjective: Pt returns from vacation and states hip is no worse than before she left.       Objective: See treatment diary below      Assessment: Tolerated treatment well. Patient demonstrated fatigue post treatment, exhibited good technique with therapeutic exercises, and would benefit from continued PT. Continued with interventions as noted emphasizing improving functional mobility and strength. She was challenged by program noting muscle soreness at end of session.       Plan: Progress treatment as tolerated.       Precautions: Osteopenia    POC expires Unit limit Auth Expiration date PT/OT + Visit Limit?    N/A N/A BOMN                 Visit/Unit Tracking  AUTH Status:  Date  - FOTO        BOMN Used 1 2 3 4 5 6 7 8        Remaining                    FOTO - done       Access Code: [email or web address]     Manuals      Hip belt mobs  CG CG          L LAD  CG CG CG CG                                  Neuro Re-Ed             Pt education  8'                                                                                          Ther Ex             HS str w/ strap L 3x30\" 3x30\" 3x30\" 3x30\" 3x30\" 3x30\" 3x30\" 3x30\"     SLR L 2x10 3\" 2x10 3\" 2x10 3\" 2x10 3\" 2x10 3\" 2# 2x10 3\" 2# 2x10 3\" 2# 2x10 3\"     Bridge 2x10 3\" 2x10 3\" 2x10 3\" 2x10 3\" 2x10 3\" 3x10 3\" 3x10 3\" 3x10 3\"     S/L hip abd B  2x10 ea 2x10 ea 2x10 ea 2x10 ea 2# 2x10 ea 2# 2x10 ea 2# 2x10 3\"     Reverse clamshell  nv Grn 2x10 ea Grn 2x10 ea Grn 2x10 ea Grn 3x10 ea Grn 3x10 ea Grn 3x10     Rec bike for ROM  5' 5' 5' 5' 5' 5' 5'     Leg press     nv nv       Ther Activity " "            STS  2x10 2x10 10# 2x10 10# 2x10 10# 3x10 10# 3x10 10# 3x10     Step up  6\" x20 6\" x20 6\" x20 6\" x20 8\" x20 8\" x20 8\" x20     Step down forward  nv 6\" x10 6\" x15 6\" x20 8\" x20 8\" x20 8\"x20     Gait Training                                       Modalities                                                        "

## 2024-09-06 ENCOUNTER — EVALUATION (OUTPATIENT)
Dept: PHYSICAL THERAPY | Facility: CLINIC | Age: 69
End: 2024-09-06
Payer: COMMERCIAL

## 2024-09-06 DIAGNOSIS — M16.12 PRIMARY OSTEOARTHRITIS OF LEFT HIP: Primary | ICD-10-CM

## 2024-09-06 PROCEDURE — 97110 THERAPEUTIC EXERCISES: CPT

## 2024-09-06 PROCEDURE — 97112 NEUROMUSCULAR REEDUCATION: CPT

## 2024-09-06 NOTE — PROGRESS NOTES
PT Re-Evaluation     Today's date: 2024  Patient name: Sheryl Nichols  : 1955  MRN: [id number]  Referring provider: Zenon Rae DO  Dx:   Encounter Diagnosis     ICD-10-CM    1. Primary osteoarthritis of left hip  M16.12             Start Time: 1100  Stop Time: 1146  Total time in clinic (min): 46 minutes    Assessment  Impairments: abnormal gait, abnormal or restricted ROM, impaired physical strength and pain with function  Functional limitations: stair negotiation, ambulation, sleeping  Symptom irritability: moderate    Assessment details: Pt is a 69 y/o female presenting to outpatient PT with a diagnosis of left hip OA. Upon reassessment, pt demonstrates improved hip mobility and hip strength. Functionally, these improvements have led to increased ease with ambulation, getting out of the car, sleeping, and performing her usual ADLs and household tasks. She is pleased with her progress and motivated to continue with skilled PT. Pt requested information on orthopedic surgeon as next step in POC in case she requires RENAY in the future and recommendations were provided. Pt will continue to benefit from skilled PT to address remaining limitations and continue progressing towards stated goals.     Understanding of Dx/Px/POC: good     Prognosis: good    Goals  STG - 4 weeks  1. Pt able to sleep in preferred position without disturbance due to pain. - in progress  2. Hip strength improved to at least 4+/5 to improve tolerance for ADLs. - in progress    LTG - 8 weeks  1. Pt able to ambulate community distances with decreased antalgic gait pattern. - in progress  2. FOTO score will improve by at least 10 points to demonstrate improved functional abilities. - in progress    Plan  Patient would benefit from: PT eval and skilled physical therapy  Planned modality interventions: cryotherapy and unattended electrical stimulation    Planned therapy interventions: IASTM, joint mobilization, kinesiology taping,  manual therapy, neuromuscular re-education, patient/caregiver education, strengthening, stretching, therapeutic activities, therapeutic exercise, home exercise program, graded motor, graded exercise and functional ROM exercises    Frequency: 1-2x week  Plan of Care beginning date: 2024  Plan of Care expiration date: 2024  Treatment plan discussed with: patient        Subjective Evaluation    History of Present Illness  Date of onset: 2024  Mechanism of injury: Pt current complaint is left hip pain that started about 6 months. Saw the ortho and medications were prescribed. She takes it sometimes and it's only effective sometimes. She notes difficulty with walking, getting in and out of the car, sleeping. She uses ice and heat to help manage symptoms.        - Pt reports she has improved since starting skilled PT but symptoms are not consistent. She reports having good days and bad days when it comes to activity. However, she does not an easier time getting in and out of the car.  Patient Goals  Patient goals for therapy: decreased pain, increased strength and independence with ADLs/IADLs    Pain  Current pain ratin  At best pain ratin  At worst pain ratin  Quality: sharp and dull ache          Objective     Observations     Additional Observation Details  Mild antalgic gait pattern    Palpation     Additional Palpation Details  No tenderness noted based on palpation    Active Range of Motion   Left Hip   Flexion: 110 degrees     Right Hip   Flexion: 110 degrees     Additional Active Range of Motion Details  Limited hip IR/ER on L    Strength/Myotome Testing     Left Hip   Planes of Motion   Flexion: 4+  Extension: 4  Abduction: 4    Right Hip   Planes of Motion   Flexion: 4+  Extension: 4+  Abduction: 4+    Left Knee   Normal strength    Right Knee   Normal strength    Tests     Left Hip   Positive MARY, FADIR and scour.              Precautions: Osteopenia    POC expires Unit limit Auth  "Expiration date PT/OT + Visit Limit?   11/1 N/A 12/31 BOMN                 Visit/Unit Tracking  AUTH Status:  Date 7/18 7/22 7/24 7/29 8/1 - FOTO 8/14 8/21 8/30 9/6      BOMN Used 1 2 3 4 5 6 7 8 9       Remaining                    FOTO - done 8/1      Access Code: [email or web address]     Manuals 7/18 7/22 7/24 7/29 8/1 8/14 8/21 8/30 9/6    Hip belt mobs  CG CG          L LAD  CG CG CG CG                                  Neuro Re-Ed             Pt education  8'            Reassessment         8'                                                                     Ther Ex             HS str w/ strap L 3x30\" 3x30\" 3x30\" 3x30\" 3x30\" 3x30\" 3x30\" 3x30\" 3x30\"    SLR L 2x10 3\" 2x10 3\" 2x10 3\" 2x10 3\" 2x10 3\" 2# 2x10 3\" 2# 2x10 3\" 2# 2x10 3\" 2# 2x10 3\"    Bridge 2x10 3\" 2x10 3\" 2x10 3\" 2x10 3\" 2x10 3\" 3x10 3\" 3x10 3\" 3x10 3\" 3x10 3\"    S/L hip abd B  2x10 ea 2x10 ea 2x10 ea 2x10 ea 2# 2x10 ea 2# 2x10 ea 2# 2x10 3\" 2# 2x10 3\"    Reverse clamshell  nv Grn 2x10 ea Grn 2x10 ea Grn 2x10 ea Grn 3x10 ea Grn 3x10 ea Grn 3x10 Grn 3x10 ea    Rec bike for ROM  5' 5' 5' 5' 5' 5' 5' 5'    Leg press     nv nv       Ther Activity             STS  2x10 2x10 10# 2x10 10# 2x10 10# 3x10 10# 3x10 10# 3x10 No weight 3x10    Step up  6\" x20 6\" x20 6\" x20 6\" x20 8\" x20 8\" x20 8\" x20 8\" x20    Step down forward  nv 6\" x10 6\" x15 6\" x20 8\" x20 8\" x20 8\"x20     Gait Training                                       Modalities                                            "

## 2024-09-13 ENCOUNTER — OFFICE VISIT (OUTPATIENT)
Dept: PHYSICAL THERAPY | Facility: CLINIC | Age: 69
End: 2024-09-13
Payer: COMMERCIAL

## 2024-09-13 DIAGNOSIS — M16.12 PRIMARY OSTEOARTHRITIS OF LEFT HIP: Primary | ICD-10-CM

## 2024-09-13 PROCEDURE — 97112 NEUROMUSCULAR REEDUCATION: CPT

## 2024-09-13 PROCEDURE — 97110 THERAPEUTIC EXERCISES: CPT

## 2024-09-13 NOTE — PROGRESS NOTES
"Daily Note     Today's date: 2024  Patient name: Sheryl Nichols  : 1955  MRN: [id number]  Referring provider: Zenon Rae DO  Dx:   Encounter Diagnosis     ICD-10-CM    1. Primary osteoarthritis of left hip  M16.12           Start Time: 1100  Stop Time: 1146  Total time in clinic (min): 46 minutes    Subjective: Pt reports her hip is feeling much better this session compared to last session.       Objective: See treatment diary below      Assessment: Tolerated treatment well. Patient demonstrated fatigue post treatment, exhibited good technique with therapeutic exercises, and would benefit from continued PT. Able to resume weight for STS this session to continue improving functional strength. She denied any new or increased symptoms during or following session.       Plan: Progress treatment as tolerated.       Precautions: Osteopenia    POC expires Unit limit Auth Expiration date PT/OT + Visit Limit?    N/A  BOMN                 Visit/Unit Tracking  AUTH Status:  Date  - FOTO      BOMN Used 1 2 3 4 5 6 7 8 9 10      Remaining                    FOTO - done       Access Code: [email or web address]     Manuals    Hip belt mobs  CG CG          L LAD  CG CG CG CG                                  Neuro Re-Ed             Pt education  8'            Reassessment         8'                                                                     Ther Ex             HS str w/ strap L 3x30\" 3x30\" 3x30\" 3x30\" 3x30\" 3x30\" 3x30\" 3x30\" 3x30\" 3x30\"   SLR L 2x10 3\" 2x10 3\" 2x10 3\" 2x10 3\" 2x10 3\" 2# 2x10 3\" 2# 2x10 3\" 2# 2x10 3\" 2# 2x10 3\" 2# 2x10 3\"   Bridge 2x10 3\" 2x10 3\" 2x10 3\" 2x10 3\" 2x10 3\" 3x10 3\" 3x10 3\" 3x10 3\" 3x10 3\" 3x10 3\"   S/L hip abd B  2x10 ea 2x10 ea 2x10 ea 2x10 ea 2# 2x10 ea 2# 2x10 ea 2# 2x10 3\" 2# 2x10 3\" 2# 2x10 3\"   Reverse clamshell  nv Grn 2x10 ea Grn 2x10 ea Grn 2x10 ea Grn 3x10 ea Grn 3x10 ea " "Grn 3x10 Grn 3x10 ea    Rec bike for ROM  5' 5' 5' 5' 5' 5' 5' 5' 5'   Leg press     nv nv       Ther Activity             STS  2x10 2x10 10# 2x10 10# 2x10 10# 3x10 10# 3x10 10# 3x10 No weight 3x10 10# 2x10   Step up  6\" x20 6\" x20 6\" x20 6\" x20 8\" x20 8\" x20 8\" x20 8\" x20 8\" x20   Step down forward  nv 6\" x10 6\" x15 6\" x20 8\" x20 8\" x20 8\"x20     Gait Training                                       Modalities                                            "

## 2024-09-18 ENCOUNTER — APPOINTMENT (OUTPATIENT)
Dept: PHYSICAL THERAPY | Facility: CLINIC | Age: 69
End: 2024-09-18
Payer: COMMERCIAL

## 2024-09-25 ENCOUNTER — OFFICE VISIT (OUTPATIENT)
Dept: PHYSICAL THERAPY | Facility: CLINIC | Age: 69
End: 2024-09-25
Payer: COMMERCIAL

## 2024-09-25 DIAGNOSIS — M16.12 PRIMARY OSTEOARTHRITIS OF LEFT HIP: Primary | ICD-10-CM

## 2024-09-25 PROCEDURE — 97112 NEUROMUSCULAR REEDUCATION: CPT

## 2024-09-25 PROCEDURE — 97110 THERAPEUTIC EXERCISES: CPT

## 2024-09-25 NOTE — PROGRESS NOTES
"Daily Note     Today's date: 2024  Patient name: Sheryl Nichols  : 1955  MRN: [id number]  Referring provider: Zenon Rae DO  Dx:   Encounter Diagnosis     ICD-10-CM    1. Primary osteoarthritis of left hip  M16.12           Start Time: 1100  Stop Time: 1145  Total time in clinic (min): 45 minutes    Subjective: Pt reports the right side of her back started hurting when she was walking on an uneven trail.       Objective: See treatment diary below      Assessment: Tolerated treatment well. Pt demonstrates good effort throughout session. Minimal cueing given to facilitate proper exercise performance and technique. She reports no increase in pain with exercises today. She requested to hold on weight with STS to see if she feels better for the 2 days following PT. Patient demonstrated fatigue post treatment, exhibited good technique with therapeutic exercises, and would benefit from continued PT      Plan: Continue per plan of care.      Precautions: Osteopenia    POC expires Unit limit Auth Expiration date PT/OT + Visit Limit?    N/A  BOMN                 Visit/Unit Tracking  AUTH Status:  Date  - FOTO     BOMN Used 1 2 3 4 5 6 7 8 9 10 11     Remaining                    FOTO - done       Access Code: [email or web address]     Manuals    Hip belt mobs   CG          L LAD   CG CG CG                                  Neuro Re-Ed             Pt education              Reassessment         8'                                                                     Ther Ex             HS str w/ strap L 3x30\"  3x30\" 3x30\" 3x30\" 3x30\" 3x30\" 3x30\" 3x30\" 3x30\"   SLR L 2# 2x10 3\"  2x10 3\" 2x10 3\" 2x10 3\" 2# 2x10 3\" 2# 2x10 3\" 2# 2x10 3\" 2# 2x10 3\" 2# 2x10 3\"   Bridge 3x10 3\"  2x10 3\" 2x10 3\" 2x10 3\" 3x10 3\" 3x10 3\" 3x10 3\" 3x10 3\" 3x10 3\"   S/L hip abd B 2# 2x10 3\"  2x10 ea 2x10 ea 2x10 ea 2# 2x10 ea 2# 2x10 ea 2# 2x10 " "3\" 2# 2x10 3\" 2# 2x10 3\"   Reverse clamshell Grn 3x10   Grn 2x10 ea Grn 2x10 ea Grn 2x10 ea Grn 3x10 ea Grn 3x10 ea Grn 3x10 Grn 3x10 ea    Rec bike for ROM 5'  5' 5' 5' 5' 5' 5' 5' 5'   Leg press     nv nv       Ther Activity             STS 3x10 no weight  2x10 10# 2x10 10# 2x10 10# 3x10 10# 3x10 10# 3x10 No weight 3x10 10# 2x10   Step up 8\" x20  6\" x20 6\" x20 6\" x20 8\" x20 8\" x20 8\" x20 8\" x20 8\" x20   Step down forward   6\" x10 6\" x15 6\" x20 8\" x20 8\" x20 8\"x20     Gait Training                                       Modalities                                              "

## 2024-10-01 ENCOUNTER — RA CDI HCC (OUTPATIENT)
Dept: OTHER | Facility: HOSPITAL | Age: 69
End: 2024-10-01

## 2024-10-02 ENCOUNTER — OFFICE VISIT (OUTPATIENT)
Dept: PHYSICAL THERAPY | Facility: CLINIC | Age: 69
End: 2024-10-02
Payer: COMMERCIAL

## 2024-10-02 DIAGNOSIS — M16.12 PRIMARY OSTEOARTHRITIS OF LEFT HIP: Primary | ICD-10-CM

## 2024-10-02 PROCEDURE — 97112 NEUROMUSCULAR REEDUCATION: CPT

## 2024-10-02 PROCEDURE — 97110 THERAPEUTIC EXERCISES: CPT

## 2024-10-02 NOTE — PROGRESS NOTES
"Daily Note     Today's date: 10/2/2024  Patient name: Sheryl Nichols  : 1955  MRN: [id number]  Referring provider: Zenon Rae DO  Dx:   Encounter Diagnosis     ICD-10-CM    1. Primary osteoarthritis of left hip  M16.12                      Subjective: Pt reports she has been taking a pain pill at night with dinner and it has been helping her sleep well and it carries over into her day.       Objective: See treatment diary below      Assessment: Tolerated treatment well. Pt demonstrates good effort throughout session. Minimal cueing given to facilitate proper exercise performance and technique. She reports no increase in pain with exercises today but she does report fatigue with sidelying hip abd with use of weights.  Patient demonstrated fatigue post treatment, exhibited good technique with therapeutic exercises, and would benefit from continued PT      Plan: Continue per plan of care.      Precautions: Osteopenia    POC expires Unit limit Auth Expiration date PT/OT + Visit Limit?    N/A  BOMN                 Visit/Unit Tracking  AUTH Status:  Date  - FOTO 8/14 8/21 8/30 9/6 9/13 9/25 10/2   BOMN Used 1 2 3 4 5 6 7 8 9 10 11 12    Remaining                    FOTO - done       Access Code: [email or web address]     Manuals 9/25 10/2    8/14 8/21 8/30 9/6 9/13   Hip belt mobs             L LAD                                       Neuro Re-Ed             Pt education              Reassessment         8'                                                                     Ther Ex             HS str w/ strap L 3x30\" 3x30\"    3x30\" 3x30\" 3x30\" 3x30\" 3x30\"   SLR L 2# 2x10 3\" 2# 2x10 3\"    2# 2x10 3\" 2# 2x10 3\" 2# 2x10 3\" 2# 2x10 3\" 2# 2x10 3\"   Bridge 3x10 3\" 3x10 3\"    3x10 3\" 3x10 3\" 3x10 3\" 3x10 3\" 3x10 3\"   S/L hip abd B 2# 2x10 3\" 2# 2x10 3\"    2# 2x10 ea 2# 2x10 ea 2# 2x10 3\" 2# 2x10 3\" 2# 2x10 3\"   Reverse clamshell Grn 3x10  Grn 3x10    Grn 3x10 ea Grn 3x10 ea Grn 3x10 Grn " "3x10 ea    Rec bike for ROM 5' 5'    5' 5' 5' 5' 5'   Leg press      nv       Ther Activity             STS 3x10 no weight 3x10 no weight Weight nv?   10# 3x10 10# 3x10 10# 3x10 No weight 3x10 10# 2x10   Step up 8\" x20 8\"x20    8\" x20 8\" x20 8\" x20 8\" x20 8\" x20   Step down forward   Nv?   8\" x20 8\" x20 8\"x20     Gait Training                                       Modalities                                                "

## 2024-10-08 ENCOUNTER — APPOINTMENT (OUTPATIENT)
Dept: LAB | Facility: HOSPITAL | Age: 69
End: 2024-10-08
Payer: COMMERCIAL

## 2024-10-08 ENCOUNTER — OFFICE VISIT (OUTPATIENT)
Dept: INTERNAL MEDICINE CLINIC | Facility: CLINIC | Age: 69
End: 2024-10-08
Payer: COMMERCIAL

## 2024-10-08 VITALS
SYSTOLIC BLOOD PRESSURE: 140 MMHG | WEIGHT: 149 LBS | HEIGHT: 63 IN | RESPIRATION RATE: 18 BRPM | OXYGEN SATURATION: 98 % | BODY MASS INDEX: 26.4 KG/M2 | DIASTOLIC BLOOD PRESSURE: 82 MMHG | HEART RATE: 83 BPM

## 2024-10-08 DIAGNOSIS — F41.9 ANXIETY DISORDER, UNSPECIFIED TYPE: ICD-10-CM

## 2024-10-08 DIAGNOSIS — R73.01 IMPAIRED FASTING BLOOD SUGAR: ICD-10-CM

## 2024-10-08 DIAGNOSIS — E55.9 MILD VITAMIN D DEFICIENCY: ICD-10-CM

## 2024-10-08 DIAGNOSIS — R03.0 ELEVATED BLOOD PRESSURE READING IN OFFICE WITHOUT DIAGNOSIS OF HYPERTENSION: ICD-10-CM

## 2024-10-08 DIAGNOSIS — E78.49 OTHER HYPERLIPIDEMIA: ICD-10-CM

## 2024-10-08 DIAGNOSIS — Z23 ENCOUNTER FOR IMMUNIZATION: Primary | ICD-10-CM

## 2024-10-08 DIAGNOSIS — R03.0 ELEVATED BLOOD PRESSURE READING WITHOUT DIAGNOSIS OF HYPERTENSION: ICD-10-CM

## 2024-10-08 LAB
25(OH)D3 SERPL-MCNC: 56.6 NG/ML (ref 30–100)
ALBUMIN SERPL BCG-MCNC: 4.2 G/DL (ref 3.5–5)
ALP SERPL-CCNC: 66 U/L (ref 34–104)
ALT SERPL W P-5'-P-CCNC: 13 U/L (ref 7–52)
ANION GAP SERPL CALCULATED.3IONS-SCNC: 6 MMOL/L (ref 4–13)
AST SERPL W P-5'-P-CCNC: 16 U/L (ref 13–39)
BASOPHILS # BLD AUTO: 0.02 THOUSANDS/ΜL (ref 0–0.1)
BASOPHILS NFR BLD AUTO: 0 % (ref 0–1)
BILIRUB SERPL-MCNC: 0.37 MG/DL (ref 0.2–1)
BUN SERPL-MCNC: 13 MG/DL (ref 5–25)
CALCIUM SERPL-MCNC: 9.4 MG/DL (ref 8.4–10.2)
CHLORIDE SERPL-SCNC: 104 MMOL/L (ref 96–108)
CHOLEST SERPL-MCNC: 253 MG/DL
CO2 SERPL-SCNC: 28 MMOL/L (ref 21–32)
CREAT SERPL-MCNC: 0.63 MG/DL (ref 0.6–1.3)
EOSINOPHIL # BLD AUTO: 0.19 THOUSAND/ΜL (ref 0–0.61)
EOSINOPHIL NFR BLD AUTO: 3 % (ref 0–6)
ERYTHROCYTE [DISTWIDTH] IN BLOOD BY AUTOMATED COUNT: 13.3 % (ref 11.6–15.1)
EST. AVERAGE GLUCOSE BLD GHB EST-MCNC: 114 MG/DL
GFR SERPL CREATININE-BSD FRML MDRD: 92 ML/MIN/1.73SQ M
GLUCOSE P FAST SERPL-MCNC: 88 MG/DL (ref 65–99)
HBA1C MFR BLD: 5.6 %
HCT VFR BLD AUTO: 40.3 % (ref 34.8–46.1)
HDLC SERPL-MCNC: 70 MG/DL
HGB BLD-MCNC: 13.1 G/DL (ref 11.5–15.4)
IMM GRANULOCYTES # BLD AUTO: 0.03 THOUSAND/UL (ref 0–0.2)
IMM GRANULOCYTES NFR BLD AUTO: 1 % (ref 0–2)
LDLC SERPL CALC-MCNC: 156 MG/DL (ref 0–100)
LYMPHOCYTES # BLD AUTO: 2.32 THOUSANDS/ΜL (ref 0.6–4.47)
LYMPHOCYTES NFR BLD AUTO: 39 % (ref 14–44)
MCH RBC QN AUTO: 29.8 PG (ref 26.8–34.3)
MCHC RBC AUTO-ENTMCNC: 32.5 G/DL (ref 31.4–37.4)
MCV RBC AUTO: 92 FL (ref 82–98)
MONOCYTES # BLD AUTO: 0.4 THOUSAND/ΜL (ref 0.17–1.22)
MONOCYTES NFR BLD AUTO: 7 % (ref 4–12)
NEUTROPHILS # BLD AUTO: 3.06 THOUSANDS/ΜL (ref 1.85–7.62)
NEUTS SEG NFR BLD AUTO: 50 % (ref 43–75)
NRBC BLD AUTO-RTO: 0 /100 WBCS
PLATELET # BLD AUTO: 318 THOUSANDS/UL (ref 149–390)
PMV BLD AUTO: 9.7 FL (ref 8.9–12.7)
POTASSIUM SERPL-SCNC: 4.5 MMOL/L (ref 3.5–5.3)
PROT SERPL-MCNC: 7.5 G/DL (ref 6.4–8.4)
RBC # BLD AUTO: 4.4 MILLION/UL (ref 3.81–5.12)
SODIUM SERPL-SCNC: 138 MMOL/L (ref 135–147)
TRIGL SERPL-MCNC: 137 MG/DL
TSH SERPL DL<=0.05 MIU/L-ACNC: 3.02 UIU/ML (ref 0.45–4.5)
WBC # BLD AUTO: 6.02 THOUSAND/UL (ref 4.31–10.16)

## 2024-10-08 PROCEDURE — 82306 VITAMIN D 25 HYDROXY: CPT

## 2024-10-08 PROCEDURE — 84443 ASSAY THYROID STIM HORMONE: CPT

## 2024-10-08 PROCEDURE — G0009 ADMIN PNEUMOCOCCAL VACCINE: HCPCS | Performed by: INTERNAL MEDICINE

## 2024-10-08 PROCEDURE — 99214 OFFICE O/P EST MOD 30 MIN: CPT | Performed by: INTERNAL MEDICINE

## 2024-10-08 PROCEDURE — 80061 LIPID PANEL: CPT

## 2024-10-08 PROCEDURE — 80053 COMPREHEN METABOLIC PANEL: CPT

## 2024-10-08 PROCEDURE — 90677 PCV20 VACCINE IM: CPT | Performed by: INTERNAL MEDICINE

## 2024-10-08 PROCEDURE — 83036 HEMOGLOBIN GLYCOSYLATED A1C: CPT

## 2024-10-08 PROCEDURE — 85025 COMPLETE CBC W/AUTO DIFF WBC: CPT

## 2024-10-08 PROCEDURE — 36415 COLL VENOUS BLD VENIPUNCTURE: CPT

## 2024-10-08 NOTE — PROGRESS NOTES
Ambulatory Visit  Name: Sheryl Nichols      : 1955      MRN: [id number]  Encounter Provider: Gómez Peralta MD  Encounter Date: 10/8/2024   Encounter department: [facility] INTERNAL MEDICINE [place]    Assessment & Plan  Encounter for immunization    Orders:    Pneumococcal Conjugate Vaccine 20-valent (Pcv20)    Elevated blood pressure reading without diagnosis of hypertension    Better today, continue to monitor.  Orders:    CBC and differential; Future    Basic metabolic panel; Future      Depression Screening and Follow-up Plan: Patient was screened for depression during today's encounter. They screened negative with a PHQ-2 score of 0.Patient with underlying depression and was advised to continue current medications as prescribed.       History of Present Illness   Here for follow up.        History obtained from : patient  Review of Systems   Constitutional:  Negative for chills and fever.   HENT:  Negative for ear pain and sore throat.    Eyes:  Negative for pain and visual disturbance.   Respiratory:  Negative for cough and shortness of breath.    Cardiovascular:  Negative for chest pain and palpitations.   Gastrointestinal:  Negative for abdominal pain and vomiting.   Genitourinary:  Negative for dysuria and hematuria.   Musculoskeletal:  Negative for arthralgias and back pain.   Skin:  Negative for color change and rash.   Neurological:  Negative for seizures and syncope.   All other systems reviewed and are negative.    Past Medical History   Past Medical History:   Diagnosis Date    Arthritis     Vocal cord nodules     removed     Past Surgical History:   Procedure Laterality Date     SECTION      LARYNGOSCOPY      with stripping of vocal cords     Family History   Problem Relation Age of Onset    Lung cancer Mother     Breast cancer Mother 45    Diabetes type I Mother     Lung cancer Father     Cancer Father     Diabetes type I Father     Diabetes Daughter          "mellitus    Diabetes Son         mellitus    Mental illness Son         Recently had a change in medication    No Known Problems Sister     No Known Problems Maternal Grandmother     No Known Problems Paternal Grandmother     No Known Problems Daughter     Stroke Son         Recently experienced another stroke    Diabetes Son      Current Outpatient Medications on File Prior to Visit   Medication Sig Dispense Refill    meloxicam (MOBIC) 15 mg tablet TAKE 1 TABLET (15 MG TOTAL) BY MOUTH DAILY. 30 tablet 5    multivitamin-minerals (CENTRUM) tablet Take by mouth       Vitamin D-Vitamin K (VITAMIN K2-VITAMIN D3 PO) Take by mouth       No current facility-administered medications on file prior to visit.   No Known Allergies       Objective   /82 (BP Location: Left arm, Patient Position: Sitting, Cuff Size: Adult)   Pulse 83   Resp 18   Ht 5' 2.5\" (1.588 m)   Wt 67.6 kg (149 lb)   LMP  (LMP Unknown)   SpO2 98%   BMI 26.82 kg/m²     Physical Exam  Vitals and nursing note reviewed.   Constitutional:       General: She is not in acute distress.     Appearance: She is well-developed.   HENT:      Head: Normocephalic and atraumatic.   Eyes:      Conjunctiva/sclera: Conjunctivae normal.   Cardiovascular:      Rate and Rhythm: Normal rate and regular rhythm.      Heart sounds: No murmur heard.  Pulmonary:      Effort: Pulmonary effort is normal. No respiratory distress.      Breath sounds: Normal breath sounds.   Abdominal:      Palpations: Abdomen is soft.      Tenderness: There is no abdominal tenderness.   Musculoskeletal:         General: No swelling.      Cervical back: Neck supple.   Skin:     General: Skin is warm and dry.      Capillary Refill: Capillary refill takes less than 2 seconds.   Neurological:      Mental Status: She is alert.   Psychiatric:         Mood and Affect: Mood normal.       I have spent a total time of 15 minutes in caring for this patient on the day of the visit/encounter including " Instructions for management, Patient and family education, Risk factor reductions, Counseling / Coordination of care, and Documenting in the medical record.

## 2024-10-09 ENCOUNTER — OFFICE VISIT (OUTPATIENT)
Dept: PHYSICAL THERAPY | Facility: CLINIC | Age: 69
End: 2024-10-09
Payer: COMMERCIAL

## 2024-10-09 DIAGNOSIS — M16.12 PRIMARY OSTEOARTHRITIS OF LEFT HIP: Primary | ICD-10-CM

## 2024-10-09 PROCEDURE — 97110 THERAPEUTIC EXERCISES: CPT

## 2024-10-09 PROCEDURE — 97112 NEUROMUSCULAR REEDUCATION: CPT

## 2024-10-09 NOTE — PROGRESS NOTES
"Daily Note     Today's date: 10/9/2024  Patient name: Sheryl Nichols  : 1955  MRN: [id number]  Referring provider: Zenon Rae DO  Dx:   Encounter Diagnosis     ICD-10-CM    1. Primary osteoarthritis of left hip  M16.12           Start Time: 1100  Stop Time: 1146  Total time in clinic (min): 46 minutes    Subjective: Pt reports she is stiff today but no worse than usual.       Objective: See treatment diary below      Assessment: Tolerated treatment well. Patient demonstrated fatigue post treatment, exhibited good technique with therapeutic exercises, and would benefit from continued PT. Pt continues to work through program denying any new or increased symptoms during or following session. Resumed weight for STS to continue improving functional strength.       Plan: Progress treatment as tolerated.       Precautions: Osteopenia    POC expires Unit limit Auth Expiration date PT/OT + Visit Limit?    N/A  BOMN                 Visit/Unit Tracking  AUTH Status:  Date  - FOTO 8/14 8/21 8/30 9/6 9/13 9/25 10/2   BOMN Used 1 2 3 4 5 6 7 8 9 10 11 12    Remaining                    FOTO - done       Access Code: [email or web address]     Manuals 9/25 10/2 10/9   8/14 8/21 8/30 9/6 9/13   Hip belt mobs             L LAD                                       Neuro Re-Ed             Pt education              Reassessment         8'                                                                     Ther Ex             HS str w/ strap L 3x30\" 3x30\" 3x30\"   3x30\" 3x30\" 3x30\" 3x30\" 3x30\"   SLR L 2# 2x10 3\" 2# 2x10 3\" 2# 3x10 3\"   2# 2x10 3\" 2# 2x10 3\" 2# 2x10 3\" 2# 2x10 3\" 2# 2x10 3\"   Bridge 3x10 3\" 3x10 3\" 3x10 3\"   3x10 3\" 3x10 3\" 3x10 3\" 3x10 3\" 3x10 3\"   S/L hip abd B 2# 2x10 3\" 2# 2x10 3\" 2# 3x10 3\"   2# 2x10 ea 2# 2x10 ea 2# 2x10 3\" 2# 2x10 3\" 2# 2x10 3\"   Reverse clamshell Grn 3x10  Grn 3x10 Grn 3x10   Grn 3x10 ea Grn 3x10 ea Grn 3x10 Grn 3x10 ea    Rec bike for ROM 5' 5' 5'   5' 5' " "5' 5' 5'   Leg press      nv       Ther Activity             STS 3x10 no weight 3x10 no weight 5# 3x10   10# 3x10 10# 3x10 10# 3x10 No weight 3x10 10# 2x10   Step up 8\" x20 8\"x20 8\" x20   8\" x20 8\" x20 8\" x20 8\" x20 8\" x20   Step down forward   Nv?   8\" x20 8\" x20 8\"x20     Gait Training                                       Modalities                                                  "

## 2024-10-16 ENCOUNTER — OFFICE VISIT (OUTPATIENT)
Dept: PHYSICAL THERAPY | Facility: CLINIC | Age: 69
End: 2024-10-16
Payer: COMMERCIAL

## 2024-10-16 DIAGNOSIS — M16.12 PRIMARY OSTEOARTHRITIS OF LEFT HIP: Primary | ICD-10-CM

## 2024-10-16 PROCEDURE — 97110 THERAPEUTIC EXERCISES: CPT

## 2024-10-16 PROCEDURE — 97530 THERAPEUTIC ACTIVITIES: CPT

## 2024-10-16 NOTE — PROGRESS NOTES
"Daily Note     Today's date: 10/16/2024  Patient name: Sheryl Nichols  : 1955  MRN: [id number]  Referring provider: Zenon Rae DO  Dx:   Encounter Diagnosis     ICD-10-CM    1. Primary osteoarthritis of left hip  M16.12           Start Time: 1100  Stop Time: 1145  Total time in clinic (min): 45 minutes    Subjective: Pt reports she was not too sore after last session. She is trying anti-inflammatory supplements recommended by her doctor.       Objective: See treatment diary below      Assessment: Tolerated treatment well. Patient demonstrated fatigue post treatment, exhibited good technique with therapeutic exercises, and would benefit from continued PT. Pt continues to work well through program denying any new or increased symptoms during or following session. She is challenged by program but requires less verbal cuing overall to ensure proper form and technique with exercises.       Plan: Progress treatment as tolerated.       Precautions: Osteopenia    POC expires Unit limit Auth Expiration date PT/OT + Visit Limit?    N/A  BOMN                 Visit/Unit Tracking  AUTH Status:  Date  - FOTO 8/14 8/21 8/30 9/6 9/13 9/25 10/2   BOMN Used 10/9 10/16              Remaining                    FOTO - done       Access Code: [email or web address]     Manuals 9/25 10/2 10/9 10/16  8/14 8/21 8/30 9/6 9/13   Hip belt mobs             L LAD                                       Neuro Re-Ed             Pt education              Reassessment         8'                                                                     Ther Ex             HS str w/ strap L 3x30\" 3x30\" 3x30\" 3x30\"  3x30\" 3x30\" 3x30\" 3x30\" 3x30\"   SLR L 2# 2x10 3\" 2# 2x10 3\" 2# 3x10 3\" 2# 3x10 3\"  2# 2x10 3\" 2# 2x10 3\" 2# 2x10 3\" 2# 2x10 3\" 2# 2x10 3\"   Bridge 3x10 3\" 3x10 3\" 3x10 3\" 3x10 3\"  3x10 3\" 3x10 3\" 3x10 3\" 3x10 3\" 3x10 3\"   S/L hip abd B 2# 2x10 3\" 2# 2x10 3\" 2# 3x10 3\" 2# 3x10  2# 2x10 ea 2# 2x10 ea 2# 2x10 " "3\" 2# 2x10 3\" 2# 2x10 3\"   Reverse clamshell Grn 3x10  Grn 3x10 Grn 3x10 Grn 3x10  Grn 3x10 ea Grn 3x10 ea Grn 3x10 Grn 3x10 ea    Rec bike for ROM 5' 5' 5' 5'  5' 5' 5' 5' 5'   Leg press      nv       Ther Activity             STS 3x10 no weight 3x10 no weight 5# 3x10 5# 3x10  10# 3x10 10# 3x10 10# 3x10 No weight 3x10 10# 2x10   Step up 8\" x20 8\"x20 8\" x20 8\" x20  8\" x20 8\" x20 8\" x20 8\" x20 8\" x20   Step down forward   Nv?   8\" x20 8\" x20 8\"x20     Gait Training                                       Modalities                                                    "

## 2024-10-23 ENCOUNTER — OFFICE VISIT (OUTPATIENT)
Dept: PHYSICAL THERAPY | Facility: CLINIC | Age: 69
End: 2024-10-23
Payer: COMMERCIAL

## 2024-10-23 DIAGNOSIS — M16.12 PRIMARY OSTEOARTHRITIS OF LEFT HIP: Primary | ICD-10-CM

## 2024-10-23 PROCEDURE — 97110 THERAPEUTIC EXERCISES: CPT

## 2024-10-23 PROCEDURE — 97530 THERAPEUTIC ACTIVITIES: CPT

## 2024-10-23 NOTE — PROGRESS NOTES
"Daily Note     Today's date: 10/23/2024  Patient name: Sheryl Nichols  : 1955  MRN: [id number]  Referring provider: Zenon aRe DO  Dx:   Encounter Diagnosis     ICD-10-CM    1. Primary osteoarthritis of left hip  M16.12           Start Time: 1100  Stop Time: 1146  Total time in clinic (min): 46 minutes    Subjective: Pt reports today is a good day with less hip pain.       Objective: See treatment diary below      Assessment: Tolerated treatment well. Patient demonstrated fatigue post treatment, exhibited good technique with therapeutic exercises, and would benefit from continued PT. Progressed weights for leg raises this session to continue improving functional strength. She was challenged by progression but noted only fatigue at end of session without reproduction of pain.       Plan: Progress treatment as tolerated.       Precautions: Osteopenia    POC expires Unit limit Auth Expiration date PT/OT + Visit Limit?    N/A  BOMN                 Visit/Unit Tracking  AUTH Status:  Date  - FOTO 8/14 8/21 8/30 9/6 9/13 9/25 10/2   BOMN Used 10/9 10/16 10/23             Remaining                    FOTO - done       Access Code: [email or web address]     Manuals 9/25 10/2 10/9 10/16   8/21 8/30 9/6 9/13   Hip belt mobs             L LAD                                       Neuro Re-Ed             Pt education              Reassessment         8'                                                                     Ther Ex             HS str w/ strap L 3x30\" 3x30\" 3x30\" 3x30\" 3x30\"  3x30\" 3x30\" 3x30\" 3x30\"   SLR L 2# 2x10 3\" 2# 2x10 3\" 2# 3x10 3\" 2# 3x10 3\" 3# 2x10 3\"  2# 2x10 3\" 2# 2x10 3\" 2# 2x10 3\" 2# 2x10 3\"   Bridge 3x10 3\" 3x10 3\" 3x10 3\" 3x10 3\" 3x10 3\"  3x10 3\" 3x10 3\" 3x10 3\" 3x10 3\"   S/L hip abd B 2# 2x10 3\" 2# 2x10 3\" 2# 3x10 3\" 2# 3x10 3# 2x10  2# 2x10 ea 2# 2x10 3\" 2# 2x10 3\" 2# 2x10 3\"   Reverse clamshell Grn 3x10  Grn 3x10 Grn 3x10 Grn 3x10 Grn 3x10  Grn 3x10 ea Grn " "3x10 Grn 3x10 ea    Rec bike for ROM 5' 5' 5' 5' 5'  5' 5' 5' 5'   Leg press             Ther Activity             STS 3x10 no weight 3x10 no weight 5# 3x10 5# 3x10 5# 3x10  10# 3x10 10# 3x10 No weight 3x10 10# 2x10   Step up 8\" x20 8\"x20 8\" x20 8\" x20 8\" x20  8\" x20 8\" x20 8\" x20 8\" x20   Step down forward   Nv?    8\" x20 8\"x20     Gait Training                                       Modalities                                                      "

## 2024-10-30 ENCOUNTER — EVALUATION (OUTPATIENT)
Dept: PHYSICAL THERAPY | Facility: CLINIC | Age: 69
End: 2024-10-30
Payer: COMMERCIAL

## 2024-10-30 DIAGNOSIS — M16.12 PRIMARY OSTEOARTHRITIS OF LEFT HIP: Primary | ICD-10-CM

## 2024-10-30 PROCEDURE — 97110 THERAPEUTIC EXERCISES: CPT

## 2024-10-30 PROCEDURE — 97530 THERAPEUTIC ACTIVITIES: CPT

## 2024-10-30 NOTE — PROGRESS NOTES
PT Re-Evaluation     Today's date: 10/30/2024  Patient name: Sheryl Nichols  : 1955  MRN: [id number]  Referring provider: Zenon Rae DO  Dx:   Encounter Diagnosis     ICD-10-CM    1. Primary osteoarthritis of left hip  M16.12               Start Time: 1100  Stop Time: 1145  Total time in clinic (min): 45 minutes    Assessment  Impairments: abnormal gait, impaired physical strength and pain with function  Functional limitations: stair negotiation, ambulation  Symptom irritability: low    Assessment details: Pt is a 68 y/o female who has been seen in outpatient PT with a diagnosis of left hip OA. Upon reassessment, pt demonstrates improved hip mobility and hip strength as well as a decreased antalgic gait pattern. Functionally, these improvements have led to increased ease with ambulation, getting out of the car, sleeping, and performing her usual ADLs and household tasks. She is pleased with her progress and motivated to continue with skilled PT. Pt will continue to benefit from skilled PT to address remaining limitations and continue progressing towards stated goals. Plan to continue for 1 month then discharge to [facility].     Understanding of Dx/Px/POC: good     Prognosis: good    Goals  STG - 4 weeks  1. Pt able to sleep in preferred position without disturbance due to pain. - met  2. Hip strength improved to at least 4+/5 to improve tolerance for ADLs. - in progress    LTG - 8 weeks  1. Pt able to ambulate community distances with decreased antalgic gait pattern. - in progress  2. FOTO score will improve by at least 10 points to demonstrate improved functional abilities. - in progress    Plan  Patient would benefit from: PT eval and skilled physical therapy  Planned modality interventions: cryotherapy and unattended electrical stimulation    Planned therapy interventions: IASTM, joint mobilization, kinesiology taping, manual therapy, neuromuscular re-education, patient/caregiver education,  strengthening, stretching, therapeutic activities, therapeutic exercise, home exercise program, graded motor, graded exercise and functional ROM exercises    Frequency: 1-2x week  Plan of Care beginning date: 10/30/2024  Plan of Care expiration date: 2024  Treatment plan discussed with: patient        Subjective Evaluation    History of Present Illness  Date of onset: 2024  Mechanism of injury: Pt current complaint is left hip pain that started about 6 months. Saw the ortho and medications were prescribed. She takes it sometimes and it's only effective sometimes. She notes difficulty with walking, getting in and out of the car, sleeping. She uses ice and heat to help manage symptoms.       10/30 - Pt reports she has improved since starting skilled PT but symptoms are not consistent. She reports having good days and bad days when it comes to activity. She does report she is having an easier time walking but long distances continue to be limited.   Patient Goals  Patient goals for therapy: decreased pain, increased strength and independence with ADLs/IADLs    Pain  Current pain ratin  At best pain ratin  At worst pain ratin  Quality: sharp and dull ache          Objective     Observations     Additional Observation Details  Mild antalgic gait pattern    Palpation     Additional Palpation Details  No tenderness noted based on palpation    Active Range of Motion   Left Hip   Flexion: 110 degrees     Right Hip   Flexion: 110 degrees     Additional Active Range of Motion Details  Limited hip IR/ER on L    Strength/Myotome Testing     Left Hip   Planes of Motion   Flexion: 5  Extension: 4+  Abduction: 4+    Right Hip   Planes of Motion   Flexion: 5  Extension: 4+  Abduction: 4+    Left Knee   Normal strength    Right Knee   Normal strength    Tests     Left Hip   Positive MARY, HERNAN and scour.              Precautions: Osteopenia    POC expires Unit limit Auth Expiration date PT/OT + Visit Limit?  "  12/11 N/A 12/31 BOMN                 Visit/Unit Tracking  AUTH Status:  Date 7/18 7/22 7/24 7/29 8/1 - FOTO 8/14 8/21 8/30 9/6 9/13 9/25 10/2   BOMN Used 10/9 10/16 10/23 10/30            Remaining                    FOTO - done 9/25      Access Code: [email or web address]     Manuals 9/25 10/2 10/9 10/16 10/23 10/30  8/30 9/6 9/13   Hip belt mobs             L LAD                                       Neuro Re-Ed             Pt education              Reassessment         8'                                                                     Ther Ex             HS str w/ strap L 3x30\" 3x30\" 3x30\" 3x30\" 3x30\" 3x30\"  3x30\" 3x30\" 3x30\"   SLR L 2# 2x10 3\" 2# 2x10 3\" 2# 3x10 3\" 2# 3x10 3\" 3# 2x10 3\" 3# 2x10 3\"  2# 2x10 3\" 2# 2x10 3\" 2# 2x10 3\"   Bridge 3x10 3\" 3x10 3\" 3x10 3\" 3x10 3\" 3x10 3\" 3x10 3\"  3x10 3\" 3x10 3\" 3x10 3\"   S/L hip abd B 2# 2x10 3\" 2# 2x10 3\" 2# 3x10 3\" 2# 3x10 3# 2x10 3# 2x10  2# 2x10 3\" 2# 2x10 3\" 2# 2x10 3\"   Reverse clamshell Grn 3x10  Grn 3x10 Grn 3x10 Grn 3x10 Grn 3x10 Grn 3x10  Grn 3x10 Grn 3x10 ea    Rec bike for ROM 5' 5' 5' 5' 5' 5'  5' 5' 5'   Leg press             Ther Activity             STS 3x10 no weight 3x10 no weight 5# 3x10 5# 3x10 5# 3x10 5# 3x10  10# 3x10 No weight 3x10 10# 2x10   Step up 8\" x20 8\"x20 8\" x20 8\" x20 8\" x20 8\" x20  8\" x20 8\" x20 8\" x20   Step down forward   Nv?     8\"x20     Gait Training                                       Modalities                                            "

## 2024-11-06 ENCOUNTER — APPOINTMENT (OUTPATIENT)
Dept: PHYSICAL THERAPY | Facility: CLINIC | Age: 69
End: 2024-11-06
Payer: COMMERCIAL

## 2024-11-13 ENCOUNTER — OFFICE VISIT (OUTPATIENT)
Dept: PHYSICAL THERAPY | Facility: CLINIC | Age: 69
End: 2024-11-13
Payer: COMMERCIAL

## 2024-11-13 DIAGNOSIS — M16.12 PRIMARY OSTEOARTHRITIS OF LEFT HIP: Primary | ICD-10-CM

## 2024-11-13 PROCEDURE — 97530 THERAPEUTIC ACTIVITIES: CPT

## 2024-11-13 PROCEDURE — 97110 THERAPEUTIC EXERCISES: CPT

## 2024-11-13 NOTE — PROGRESS NOTES
"Daily Note     Today's date: 2024  Patient name: Sheryl Nichols  : 1955  MRN: [id number]  Referring provider: Zenon Rae DO  Dx:   Encounter Diagnosis     ICD-10-CM    1. Primary osteoarthritis of left hip  M16.12           Start Time: 1100  Stop Time: 1146  Total time in clinic (min): 46 minutes    Subjective: Pt reports she is sore but thinks it could be due to the cold weather.       Objective: See treatment diary below      Assessment: Tolerated treatment well. Patient demonstrated fatigue post treatment, exhibited good technique with therapeutic exercises, and would benefit from continued PT. Continued with interventions as noted emphasizing improving functional strength and mobility. She was challenged by program, mostly due to fatigue, but denies any increase in symptoms at end of session.       Plan: Progress treatment as tolerated.       Precautions: Osteopenia    POC expires Unit limit Auth Expiration date PT/OT + Visit Limit?    N/A  BOMN                 Visit/Unit Tracking  AUTH Status:  Date  - FOTO 8/14 8/21 8/30 9/6 9/13 9/25 10/2   BOMN Used 10/9 10/16 10/23 10/30 11/13           Remaining                    FOTO - done       Access Code: [email or web address]     Manuals 9/25 10/2 10/9 10/16 10/23 10/30 11/13      Hip belt mobs             L LAD                                       Neuro Re-Ed             Pt education              Reassessment                                                                              Ther Ex             HS str w/ strap L 3x30\" 3x30\" 3x30\" 3x30\" 3x30\" 3x30\" 3x30\"      SLR L 2# 2x10 3\" 2# 2x10 3\" 2# 3x10 3\" 2# 3x10 3\" 3# 2x10 3\" 3# 2x10 3\" 3# 3x10 3\"      Bridge 3x10 3\" 3x10 3\" 3x10 3\" 3x10 3\" 3x10 3\" 3x10 3\" 3x10 3\"      S/L hip abd B 2# 2x10 3\" 2# 2x10 3\" 2# 3x10 3\" 2# 3x10 3# 2x10 3# 2x10 3# 3x10      Reverse clamshell Grn 3x10  Grn 3x10 Grn 3x10 Grn 3x10 Grn 3x10 Grn 3x10 Grn 3x10      Rec bike for ROM 5' 5' 5' 5' " "5' 5' 5'      Leg press             Ther Activity             STS 3x10 no weight 3x10 no weight 5# 3x10 5# 3x10 5# 3x10 5# 3x10 5# 2x10      Step up 8\" x20 8\"x20 8\" x20 8\" x20 8\" x20 8\" x20 8\" x20      Step down forward   Nv?          Gait Training                                       Modalities                                            "

## 2024-11-20 ENCOUNTER — OFFICE VISIT (OUTPATIENT)
Dept: PHYSICAL THERAPY | Facility: CLINIC | Age: 69
End: 2024-11-20
Payer: COMMERCIAL

## 2024-11-20 DIAGNOSIS — M16.12 PRIMARY OSTEOARTHRITIS OF LEFT HIP: Primary | ICD-10-CM

## 2024-11-20 PROCEDURE — 97530 THERAPEUTIC ACTIVITIES: CPT

## 2024-11-20 PROCEDURE — 97110 THERAPEUTIC EXERCISES: CPT

## 2024-11-20 NOTE — PROGRESS NOTES
"Daily Note     Today's date: 2024  Patient name: Sheryl Nichols  : 1955  MRN: [id number]  Referring provider: Zenon Rae DO  Dx:   Encounter Diagnosis     ICD-10-CM    1. Primary osteoarthritis of left hip  M16.12           Start Time: 1100  Stop Time: 1146  Total time in clinic (min): 46 minutes    Subjective: Pt reports she is sore from moving a dining room table the other day.      Objective: See treatment diary below      Assessment: Tolerated treatment well. Patient demonstrated fatigue post treatment, exhibited good technique with therapeutic exercises, and would benefit from continued PT. Pt continues to work through program noting only muscle fatigue at end of session. Plan to discharge pt to [facility] next session to continue symptom management on her own as progress has begun to plateau.       Plan:  Discharge to [facility] next session.      Precautions: Osteopenia    POC expires Unit limit Auth Expiration date PT/OT + Visit Limit?    N/A  BOMN                 Visit/Unit Tracking  AUTH Status:  Date  - FOTO 8/14 8/21 8/30 9/6 9/13 9/25 10/2   BOMN Used 10/9 10/16 10/23 10/30 11/13 11/20          Remaining                    FOTO - done       Access Code: [email or web address]     Manuals 9/25 10/2 10/9 10/16 10/23 10/30 11/13      Hip belt mobs             L LAD                                       Neuro Re-Ed             Pt education              Reassessment                                                                              Ther Ex             HS str w/ strap L 3x30\" 3x30\" 3x30\" 3x30\" 3x30\" 3x30\" 3x30\" 3x30\"     SLR L 2# 2x10 3\" 2# 2x10 3\" 2# 3x10 3\" 2# 3x10 3\" 3# 2x10 3\" 3# 2x10 3\" 3# 3x10 3\" 3# 3x10 3\"     Bridge 3x10 3\" 3x10 3\" 3x10 3\" 3x10 3\" 3x10 3\" 3x10 3\" 3x10 3\" 3x10 3\"     S/L hip abd B 2# 2x10 3\" 2# 2x10 3\" 2# 3x10 3\" 2# 3x10 3# 2x10 3# 2x10 3# 3x10 3# 3x10     Reverse clamshell Grn 3x10  Grn 3x10 Grn 3x10 Grn 3x10 Grn 3x10 Grn 3x10 Grn 3x10 Grn " "3x10     Rec bike for ROM 5' 5' 5' 5' 5' 5' 5' 5'     Leg press             Ther Activity             STS 3x10 no weight 3x10 no weight 5# 3x10 5# 3x10 5# 3x10 5# 3x10 5# 2x10 5# 2x10     Step up 8\" x20 8\"x20 8\" x20 8\" x20 8\" x20 8\" x20 8\" x20 8\" x20     Step down forward   Nv?          Gait Training                                       Modalities                                              "

## 2024-12-04 ENCOUNTER — OFFICE VISIT (OUTPATIENT)
Dept: PHYSICAL THERAPY | Facility: CLINIC | Age: 69
End: 2024-12-04
Payer: COMMERCIAL

## 2024-12-04 DIAGNOSIS — M16.12 PRIMARY OSTEOARTHRITIS OF LEFT HIP: Primary | ICD-10-CM

## 2024-12-04 PROCEDURE — 97110 THERAPEUTIC EXERCISES: CPT

## 2024-12-04 PROCEDURE — 97112 NEUROMUSCULAR REEDUCATION: CPT

## 2024-12-04 NOTE — PROGRESS NOTES
"PT - Discharge     Today's date: 2024  Patient name: Sheryl Nichols  : 1955  MRN: [id number]  Referring provider: Zenon Rae DO  Dx:   Encounter Diagnosis     ICD-10-CM    1. Primary osteoarthritis of left hip  M16.12           Start Time: 1100  Stop Time: 1140  Total time in clinic (min): 40 minutes    Subjective: Pt reports she's feels ready to be discharged this session.       Objective: See treatment diary below      Assessment: Pt has progressed well with improved mobility and strength secondary to skilled PT. Functionally, these improvements have led to increased ease with performance of her usual ADLs and household tasks. She continues to have good days and bad days and was educated on use of HEP for symptom management. She was provided with written instructions for HEP and demonstrates good understanding of program. She was advised to call if she needs to return in the future.       Plan:  Discharge to HEP.     Precautions: Osteopenia    POC expires Unit limit Auth Expiration date PT/OT + Visit Limit?    N/A  BOMN                 Visit/Unit Tracking  AUTH Status:  Date  - FOTO 8/14 8/21 8/30 9/6 9/13 9/25 10/2   BOMN Used 10/9 10/16 10/23 10/30 11/13 11/20 12/4         Remaining                    FOTO - done       Access Code: [email or web address]     Manuals 9/25 10/2 10/9 10/16 10/23 10/30 11/13 11/20 12/4    Hip belt mobs             L LAD                                       Neuro Re-Ed             Pt education          5'    Reassessment                                                                              Ther Ex             HS str w/ strap L 3x30\" 3x30\" 3x30\" 3x30\" 3x30\" 3x30\" 3x30\" 3x30\" 3x30\"    SLR L 2# 2x10 3\" 2# 2x10 3\" 2# 3x10 3\" 2# 3x10 3\" 3# 2x10 3\" 3# 2x10 3\" 3# 3x10 3\" 3# 3x10 3\" 3# 3x10 3\"    Bridge 3x10 3\" 3x10 3\" 3x10 3\" 3x10 3\" 3x10 3\" 3x10 3\" 3x10 3\" 3x10 3\" 3x10 3\"    S/L hip abd B 2# 2x10 3\" 2# 2x10 3\" 2# 3x10 3\" 2# 3x10 3# 2x10 " "3# 2x10 3# 3x10 3# 3x10 3# 3x10    Reverse clamshell Grn 3x10  Grn 3x10 Grn 3x10 Grn 3x10 Grn 3x10 Grn 3x10 Grn 3x10 Grn 3x10     Rec bike for ROM 5' 5' 5' 5' 5' 5' 5' 5' 5'    Leg press             Ther Activity             STS 3x10 no weight 3x10 no weight 5# 3x10 5# 3x10 5# 3x10 5# 3x10 5# 2x10 5# 2x10 5# 2x10    Step up 8\" x20 8\"x20 8\" x20 8\" x20 8\" x20 8\" x20 8\" x20 8\" x20     Step down forward   Nv?          Gait Training                                       Modalities                                                "

## 2024-12-07 DIAGNOSIS — M16.12 PRIMARY OSTEOARTHRITIS OF LEFT HIP: ICD-10-CM

## 2024-12-09 RX ORDER — MELOXICAM 15 MG/1
15 TABLET ORAL DAILY
Qty: 30 TABLET | Refills: 5 | Status: SHIPPED | OUTPATIENT
Start: 2024-12-09

## 2024-12-09 NOTE — TELEPHONE ENCOUNTER
Call to this patient no answer left a message to make her aware the RX she requested was sent in to her pharmacy By Dr Rae as requested

## 2025-02-11 NOTE — PATIENT INSTRUCTIONS
1  UTI-will start Macrobid and send urine for culture  2  Small cut on right eyebrow area-will give Adacel today    Follow up as needed  vomiting

## 2025-03-17 ENCOUNTER — APPOINTMENT (OUTPATIENT)
Dept: RADIOLOGY | Facility: CLINIC | Age: 70
End: 2025-03-17
Payer: COMMERCIAL

## 2025-03-17 ENCOUNTER — OFFICE VISIT (OUTPATIENT)
Dept: OBGYN CLINIC | Facility: CLINIC | Age: 70
End: 2025-03-17
Payer: COMMERCIAL

## 2025-03-17 VITALS — WEIGHT: 150 LBS | BODY MASS INDEX: 26.58 KG/M2 | HEIGHT: 63 IN

## 2025-03-17 DIAGNOSIS — M65.4 RADIAL STYLOID TENOSYNOVITIS (DE QUERVAIN): ICD-10-CM

## 2025-03-17 DIAGNOSIS — M79.642 LEFT HAND PAIN: ICD-10-CM

## 2025-03-17 DIAGNOSIS — M16.12 PRIMARY OSTEOARTHRITIS OF LEFT HIP: Primary | ICD-10-CM

## 2025-03-17 PROCEDURE — 99213 OFFICE O/P EST LOW 20 MIN: CPT | Performed by: FAMILY MEDICINE

## 2025-03-17 PROCEDURE — 73130 X-RAY EXAM OF HAND: CPT

## 2025-03-17 NOTE — PROGRESS NOTES
"    Chief Complaint   Patient presents with    Left Hip - Follow-up, Locking, Pain     Limited ROM       Sheryl Nichols is a 69 y.o. female complains of left hip pain. Onset of the symptoms was several months ago.  Mechanism of injury:  none reported . Aggravating factors: walking  and weight bearing. Treatment to date: ice, PT which was ineffective, and rest. Symptoms have progressed to a point and plateaued. Having tough time with walking up and down stairs.    Additionally complaining of left wrist pain ongoing for the past several weeks without inciting injury.  Pain is experienced along the radial aspect of the wrist.    The following portions of the patient's history were reviewed and updated as appropriate: allergies, current medications, past family history, past medical history, past social history, past surgical history and problem list.    Occupation:    Review of Systems   Constitutional: Negative for fever.   HENT: Negative for dental problem and headaches.    Eyes: Negative for vision loss.   Respiratory: Negative for cough and shortness of breath.    Cardiovascular: Negative for leg swelling and palpitations.   Gastrointestinal: Negative for constipation and diarrhea.   Genitourinary: Negative for bladder incontinence and difficulty urinating.   Musculoskeletal: Negative for back pain and difficulty walking.   Skin: Negative for rash and ulcer.   Neurological: Negative for dizziness and headaches.   Hem/Lymph/Immuno: Negative for blood clots. Does not bruise/bleed easily.   Psychiatric/Behavioral: Negative for confusion.         Objective:  Ht 5' 2.5\" (1.588 m)   Wt 68 kg (150 lb)   LMP  (LMP Unknown)   BMI 27.00 kg/m²     Skin: no rashes, lesions, skin discolorations, lacerations  Vasculature: normal popliteal and pedal pulse, normal skin color, normal capillary refill in extremity, no lower extremity edema  Neurologic: Neurologic exam is normal throughout lower extremities, Awake, alert, " and oriented x3, no apparent distress.    Neuro: no weakness in the L4-S1 nerve distribution  Musculoskeletal:  Inspection: no observable abnormalities  Palpation no tenderness to palpation over greater trochanter  ROM: Full flexion and extension of the hip. full internal and external rotation  Special tests: + FADIR and MARY test         Left hand and wrist  FROM flexion and extension  Negative snuffbox ttp  No swelling or ecchymosis  Negative cmc grind  +finkelsteins         Assessment/Plan:  1. Primary osteoarthritis of left hip (Primary)  Discussed ongoing pain symptoms related to patient's left hip osteoarthritis.  Prior radiographs revealed moderate to severe degenerative changes in the left hip.  We discussed alternative treatment options available including cortisone injection treatment and surgical treatment options.  Patient would like to proceed with surgical consultation and is not interested in proceeding with cortisone injection options.  - Ambulatory Referral to Orthopedic Surgery; Future    2. Radial styloid tenosynovitis (de quervain)  Begin thumb spica brace for activity.  Advised on ice and anti-inflammatory medication for pain relief  - XR hand 3+ vw left; Future

## 2025-04-04 ENCOUNTER — APPOINTMENT (OUTPATIENT)
Dept: RADIOLOGY | Facility: MEDICAL CENTER | Age: 70
End: 2025-04-04
Payer: COMMERCIAL

## 2025-04-04 ENCOUNTER — OFFICE VISIT (OUTPATIENT)
Dept: OBGYN CLINIC | Facility: MEDICAL CENTER | Age: 70
End: 2025-04-04
Payer: COMMERCIAL

## 2025-04-04 VITALS — HEIGHT: 63 IN | WEIGHT: 154 LBS | BODY MASS INDEX: 27.29 KG/M2

## 2025-04-04 DIAGNOSIS — Z51.81 ANTICOAGULATION MANAGEMENT ENCOUNTER: ICD-10-CM

## 2025-04-04 DIAGNOSIS — Z79.01 ANTICOAGULATION MANAGEMENT ENCOUNTER: ICD-10-CM

## 2025-04-04 DIAGNOSIS — M16.12 PRIMARY OSTEOARTHRITIS OF LEFT HIP: ICD-10-CM

## 2025-04-04 DIAGNOSIS — G89.29 CHRONIC LEFT HIP PAIN: ICD-10-CM

## 2025-04-04 DIAGNOSIS — Z01.810 PRE-OPERATIVE CARDIOVASCULAR EXAMINATION: ICD-10-CM

## 2025-04-04 DIAGNOSIS — M16.12 PRIMARY OSTEOARTHRITIS OF LEFT HIP: Primary | ICD-10-CM

## 2025-04-04 DIAGNOSIS — Z47.1 AFTERCARE FOLLOWING LEFT HIP JOINT REPLACEMENT SURGERY: ICD-10-CM

## 2025-04-04 DIAGNOSIS — Z01.812 PRE-OPERATIVE LABORATORY EXAMINATION: ICD-10-CM

## 2025-04-04 DIAGNOSIS — Z96.642 AFTERCARE FOLLOWING LEFT HIP JOINT REPLACEMENT SURGERY: ICD-10-CM

## 2025-04-04 DIAGNOSIS — M25.552 CHRONIC LEFT HIP PAIN: ICD-10-CM

## 2025-04-04 PROCEDURE — 99204 OFFICE O/P NEW MOD 45 MIN: CPT | Performed by: ORTHOPAEDIC SURGERY

## 2025-04-04 PROCEDURE — 73502 X-RAY EXAM HIP UNI 2-3 VIEWS: CPT

## 2025-04-04 RX ORDER — FOLIC ACID 1 MG/1
1 TABLET ORAL DAILY
Qty: 30 TABLET | Refills: 2 | Status: SHIPPED | OUTPATIENT
Start: 2025-04-04

## 2025-04-04 RX ORDER — MUPIROCIN 20 MG/G
OINTMENT TOPICAL
Qty: 15 G | Refills: 1 | Status: SHIPPED | OUTPATIENT
Start: 2025-04-04

## 2025-04-04 RX ORDER — SODIUM CHLORIDE, SODIUM LACTATE, POTASSIUM CHLORIDE, CALCIUM CHLORIDE 600; 310; 30; 20 MG/100ML; MG/100ML; MG/100ML; MG/100ML
125 INJECTION, SOLUTION INTRAVENOUS CONTINUOUS
OUTPATIENT
Start: 2025-04-04

## 2025-04-04 RX ORDER — TRANEXAMIC ACID 10 MG/ML
1000 INJECTION, SOLUTION INTRAVENOUS ONCE
OUTPATIENT
Start: 2025-04-04 | End: 2025-04-04

## 2025-04-04 RX ORDER — CHLORHEXIDINE GLUCONATE ORAL RINSE 1.2 MG/ML
15 SOLUTION DENTAL ONCE
OUTPATIENT
Start: 2025-04-04 | End: 2025-04-04

## 2025-04-04 RX ORDER — ASCORBIC ACID 500 MG
500 TABLET ORAL 2 TIMES DAILY
Qty: 60 TABLET | Refills: 2 | Status: SHIPPED | OUTPATIENT
Start: 2025-04-04

## 2025-04-04 RX ORDER — GABAPENTIN 300 MG/1
300 CAPSULE ORAL ONCE
OUTPATIENT
Start: 2025-04-04 | End: 2025-04-04

## 2025-04-04 RX ORDER — MULTIVIT-MIN/IRON FUM/FOLIC AC 7.5 MG-4
1 TABLET ORAL DAILY
Qty: 30 TABLET | Refills: 2 | Status: SHIPPED | OUTPATIENT
Start: 2025-04-04

## 2025-04-04 RX ORDER — FERROUS SULFATE 324(65)MG
324 TABLET, DELAYED RELEASE (ENTERIC COATED) ORAL
Qty: 30 TABLET | Refills: 2 | Status: SHIPPED | OUTPATIENT
Start: 2025-04-04

## 2025-04-04 RX ORDER — ACETAMINOPHEN 325 MG/1
975 TABLET ORAL ONCE
OUTPATIENT
Start: 2025-04-04 | End: 2025-04-04

## 2025-04-04 RX ORDER — CEFAZOLIN SODIUM 2 G/50ML
2000 SOLUTION INTRAVENOUS ONCE
OUTPATIENT
Start: 2025-04-04 | End: 2025-04-04

## 2025-04-04 NOTE — ASSESSMENT & PLAN NOTE
Orders:    Comprehensive metabolic panel; Future    Hemoglobin A1C W/EAG Estimation; Future    CBC and differential; Future    MRSA culture; Future    Protime-INR; Future    APTT; Future    Type and screen; Future    Anemia Panel w/Reflex; Future

## 2025-04-04 NOTE — ASSESSMENT & PLAN NOTE
Will schedule for a left total hip arthroplasty  Orders:    Case request operating room: Left total hip arthroplasty, all associated procedures as indicated; Standing    Notify physician; Standing    Diet NPO; Sips with meds; Standing    Nursing Communication Warmimg Interventions Implemented; Standing    Nursing Communication CHG bath, have staff wash entire body (neck down) per pre-op bathing protocol. Routine, evening prior to, and day of surgery.; Standing    Nursing Communication Swab both nares with Povidone-Iodine solution, EXCLUDE if patient has shellfish/Iodine allergy, and replace with nasal alcohol swabstick. Routine, day of surgery, on call to OR; Standing    Void on call to OR; Standing    Insert peripheral IV; Standing    Ambulatory Referral to [facility]; Future    Ambulatory referral to Physical Therapy; Future    Place sequential compression device; Standing    Ambulatory referral to Family Practice; Future

## 2025-04-04 NOTE — ASSESSMENT & PLAN NOTE
Will schedule for a left total hip arthroplasty  Orders:    Ambulatory Referral to Orthopedic Surgery    XR hip/pelv 2-3 vws left if performed; Future    Case request operating room: Left total hip arthroplasty, all associated procedures as indicated; Standing    Notify physician; Standing    Diet NPO; Sips with meds; Standing    Nursing Communication Warmimg Interventions Implemented; Standing    Nursing Communication CHG bath, have staff wash entire body (neck down) per pre-op bathing protocol. Routine, evening prior to, and day of surgery.; Standing    Nursing Communication Swab both nares with Povidone-Iodine solution, EXCLUDE if patient has shellfish/Iodine allergy, and replace with nasal alcohol swabstick. Routine, day of surgery, on call to OR; Standing    Void on call to OR; Standing    Insert peripheral IV; Standing    Ambulatory Referral to [facility]; Future    Ambulatory referral to Physical Therapy; Future    Place sequential compression device; Standing    Ambulatory referral to Family Practice; Future

## 2025-04-04 NOTE — PROGRESS NOTES
Name: Sheryl Nichols      : 1955       MRN: [id number]   Encounter Provider: Chester Barron MD   Encounter Date: 25  Encounter department: [facility] [place]     ASSESSMENT & PLAN:  Assessment & Plan  Primary osteoarthritis of left hip  Will schedule for a left total hip arthroplasty  Orders:    Ambulatory Referral to Orthopedic Surgery    XR hip/pelv 2-3 vws left if performed; Future    Case request operating room: Left total hip arthroplasty, all associated procedures as indicated; Standing    Notify physician; Standing    Diet NPO; Sips with meds; Standing    Nursing Communication Warmimg Interventions Implemented; Standing    Nursing Communication CHG bath, have staff wash entire body (neck down) per pre-op bathing protocol. Routine, evening prior to, and day of surgery.; Standing    Nursing Communication Swab both nares with Povidone-Iodine solution, EXCLUDE if patient has shellfish/Iodine allergy, and replace with nasal alcohol swabstick. Routine, day of surgery, on call to OR; Standing    Void on call to OR; Standing    Insert peripheral IV; Standing    Ambulatory Referral to [facility]; Future    Ambulatory referral to Physical Therapy; Future    Place sequential compression device; Standing    Ambulatory referral to Family Practice; Future    Chronic left hip pain  Will schedule for a left total hip arthroplasty  Orders:    Case request operating room: Left total hip arthroplasty, all associated procedures as indicated; Standing    Notify physician; Standing    Diet NPO; Sips with meds; Standing    Nursing Communication Warmimg Interventions Implemented; Standing    Nursing Communication CHG bath, have staff wash entire body (neck down) per pre-op bathing protocol. Routine, evening prior to, and day of surgery.; Standing    Nursing Communication Swab both nares with Povidone-Iodine solution, EXCLUDE if patient has  shellfish/Iodine allergy, and replace with nasal alcohol swabstick. Routine, day of surgery, on call to OR; Standing    Void on call to OR; Standing    Insert peripheral IV; Standing    Ambulatory Referral to [facility]; Future    Ambulatory referral to Physical Therapy; Future    Place sequential compression device; Standing    Ambulatory referral to Family Practice; Future    Pre-operative laboratory examination    Orders:    Comprehensive metabolic panel; Future    Hemoglobin A1C W/EAG Estimation; Future    CBC and differential; Future    MRSA culture; Future    Protime-INR; Future    APTT; Future    Type and screen; Future    Anemia Panel w/Reflex; Future    Pre-operative cardiovascular examination    Orders:    EKG 12 lead; Future    Aftercare following left hip joint replacement surgery    Orders:    Ambulatory referral to Physical Therapy; Future    Anticoagulation management encounter    Orders:    CBC and differential; Future    Protime-INR; Future    APTT; Future      ___________________________________________________  Diagnostics reviewed and physical exam performed.  Diagnosis, treatment options and associated risks were discussed with the patient including no treatment, nonsurgical treatment and potential for surgical intervention.  The patient was given the opportunity to ask questions regarding each.  Quality of life decision to pursue elective left total hip arthroplasty. Risks and benefits of a left total hip arthroplasty discussed. Consents obtained. No guarantees given. Reasonable expectations of surgical outcome advised.   Pre-operative vitamins, mupirocin and post-operative lovenox sent to her pharmacy.  Discussed and recommend using a single point cane in her right hand for assistance      To do next visit:  Return for post-op with x-rays upon arrival left hip and pelvis.  ____________________________________________________  CHIEF COMPLAINT:  Chief Complaint   Patient  presents with    Left Hip - Pain         SUBJECTIVE:  Sheryl Nichols is a 69 y.o. female who presents today for initial evaluation of her left hip due to pain. She has been experiencing groin and medial thigh pain over the past year or two which has progressed. Her pain currently does limit her day-to-day activities and impede on her quality of life. Her pain can be severe and unrelenting at times.  She limps when she walks. She has difficulty putting on/off socks and or shoes, turning in bed and getting in her husbands truck.  She had a course of PT and PO Mobic without resolution of her symptoms.    They have plans to move to [place] this Summer, where she has family.            PAST MEDICAL HISTORY:  Past Medical History:   Diagnosis Date    Arthritis     Vocal cord nodules     removed       PAST SURGICAL HISTORY:  Past Surgical History:   Procedure Laterality Date     SECTION      LARYNGOSCOPY      with stripping of vocal cords       FAMILY HISTORY:  Family History   Problem Relation Age of Onset    Lung cancer Mother     Breast cancer Mother 45    Diabetes type I Mother     Lung cancer Father     Cancer Father     Diabetes type I Father     Diabetes Daughter         mellitus    Diabetes Son         mellitus    Mental illness Son         Recently had a change in medication    No Known Problems Sister     No Known Problems Maternal Grandmother     No Known Problems Paternal Grandmother     No Known Problems Daughter     Stroke Son         Recently experienced another stroke    Diabetes Son        SOCIAL HISTORY:  Social History     Tobacco Use    Smoking status: Former     Current packs/day: 0.00     Average packs/day: 0.5 packs/day for 30.0 years (15.0 ttl pk-yrs)     Types: Cigarettes     Start date: 1971     Quit date: 2001     Years since quittin.2    Smokeless tobacco: Never   Vaping Use    Vaping status: Never Used   Substance Use Topics    Alcohol use: Yes     Alcohol/week: 2.0  "standard drinks of alcohol     Types: 2 Glasses of wine per week     Comment: weekly    Drug use: No       MEDICATIONS:    Current Outpatient Medications:     meloxicam (MOBIC) 15 mg tablet, TAKE 1 TABLET (15 MG TOTAL) BY MOUTH DAILY., Disp: 30 tablet, Rfl: 5    multivitamin-minerals (CENTRUM) tablet, Take by mouth , Disp: , Rfl:     Vitamin D-Vitamin K (VITAMIN K2-VITAMIN D3 PO), Take by mouth, Disp: , Rfl:     ALLERGIES:  No Known Allergies    LABS:  HgA1c:   Lab Results   Component Value Date    HGBA1C 5.6 10/08/2024     BMP:   Lab Results   Component Value Date    CALCIUM 9.4 10/08/2024    K 4.5 10/08/2024    CO2 28 10/08/2024     10/08/2024    BUN 13 10/08/2024    CREATININE 0.63 10/08/2024     CBC: No components found for: \"CBC\"    _____________________________________________________  PHYSICAL EXAMINATION:  Vital signs: Ht 5' 2.5\" (1.588 m)   Wt 69.9 kg (154 lb)   LMP  (LMP Unknown)   BMI 27.72 kg/m²   General: No acute distress, awake and alert  Psychiatric: Mood and affect appear appropriate  HEENT: Trachea Midline, No torticollis, no apparent facial trauma  Cardiovascular: No audible murmurs; Extremities appear perfused  Pulmonary: No audible wheezing or stridor  Skin: Intact, no open lesions; see further details (if any) below    MUSCULOSKELETAL EXAMINATION:    Left Hip Exam     Range of Motion   Flexion:  80 (leg goes into ER and limited by restriction, pain, and guarding)   External rotation:  20   Left hip internal rotation: essentially no IR.     Muscle Strength   The patient has normal left hip strength (pain with resistance to hip flexion).     Other   Sensation: normal    Comments:    Upon entering the exam room and during her interview part of the exam, she will sit with her left leg externally rotated. Upon standing she stands with her leg externally rotated and on her toes    Mild leg length inequality with left leg being slightly shorter than her " right          _____________________________________________________  STUDIES REVIEWED:    The attending physician has personally reviewed the pertinent films in PACS and their interpretation is as follows:    Left hip and pelvis x-rays taken and reviewed in the office today and show: advanced degenerative changes with bone-on-bone opposition, deformity of her femoral head with mild superior flattening, subchondral sclerosis and cystic formation present. Osteophyte formation present too.  Very minimal right hip degenerative changes. Limited visualization of her lower lumbar spine which does show multi-level degenerative changes.       PROCEDURES PERFORMED:    Procedures    None Performed        Scribe Attestation      I,:  Marvin Hernandez am acting as a scribe while in the presence of the attending physician.:       I,:  Chester Barron MD personally performed the services described in this documentation    as scribed in my presence.:

## 2025-04-10 ENCOUNTER — RA CDI HCC (OUTPATIENT)
Dept: OTHER | Facility: HOSPITAL | Age: 70
End: 2025-04-10

## 2025-04-14 ENCOUNTER — TELEPHONE (OUTPATIENT)
Dept: OBGYN CLINIC | Facility: HOSPITAL | Age: 70
End: 2025-04-14

## 2025-04-14 DIAGNOSIS — M16.12 PRIMARY OSTEOARTHRITIS OF ONE HIP, LEFT: Primary | ICD-10-CM

## 2025-04-15 ENCOUNTER — APPOINTMENT (OUTPATIENT)
Dept: LAB | Facility: HOSPITAL | Age: 70
End: 2025-04-15
Payer: COMMERCIAL

## 2025-04-15 ENCOUNTER — OFFICE VISIT (OUTPATIENT)
Dept: LAB | Facility: HOSPITAL | Age: 70
End: 2025-04-15
Payer: COMMERCIAL

## 2025-04-15 ENCOUNTER — TELEPHONE (OUTPATIENT)
Dept: OBGYN CLINIC | Facility: MEDICAL CENTER | Age: 70
End: 2025-04-15

## 2025-04-15 DIAGNOSIS — Z01.812 PRE-OPERATIVE LABORATORY EXAMINATION: ICD-10-CM

## 2025-04-15 DIAGNOSIS — Z01.818 PRE-OP EVALUATION: ICD-10-CM

## 2025-04-15 DIAGNOSIS — Z79.01 ANTICOAGULATION MANAGEMENT ENCOUNTER: ICD-10-CM

## 2025-04-15 DIAGNOSIS — R03.0 ELEVATED BLOOD PRESSURE READING WITHOUT DIAGNOSIS OF HYPERTENSION: ICD-10-CM

## 2025-04-15 DIAGNOSIS — Z51.81 ANTICOAGULATION MANAGEMENT ENCOUNTER: ICD-10-CM

## 2025-04-15 DIAGNOSIS — Z01.810 PRE-OPERATIVE CARDIOVASCULAR EXAMINATION: ICD-10-CM

## 2025-04-15 LAB
ANION GAP SERPL CALCULATED.3IONS-SCNC: 5 MMOL/L (ref 4–13)
ATRIAL RATE: 64 BPM
BASOPHILS # BLD AUTO: 0.04 THOUSANDS/ÂΜL (ref 0–0.1)
BASOPHILS NFR BLD AUTO: 1 % (ref 0–1)
BUN SERPL-MCNC: 12 MG/DL (ref 5–25)
CALCIUM SERPL-MCNC: 9.6 MG/DL (ref 8.4–10.2)
CHLORIDE SERPL-SCNC: 103 MMOL/L (ref 96–108)
CO2 SERPL-SCNC: 30 MMOL/L (ref 21–32)
CREAT SERPL-MCNC: 0.59 MG/DL (ref 0.6–1.3)
EOSINOPHIL # BLD AUTO: 0.13 THOUSAND/ÂΜL (ref 0–0.61)
EOSINOPHIL NFR BLD AUTO: 2 % (ref 0–6)
ERYTHROCYTE [DISTWIDTH] IN BLOOD BY AUTOMATED COUNT: 13.8 % (ref 11.6–15.1)
GFR SERPL CREATININE-BSD FRML MDRD: 94 ML/MIN/1.73SQ M
GLUCOSE P FAST SERPL-MCNC: 85 MG/DL (ref 65–99)
HCT VFR BLD AUTO: 40.7 % (ref 34.8–46.1)
HGB BLD-MCNC: 13.1 G/DL (ref 11.5–15.4)
IMM GRANULOCYTES # BLD AUTO: 0.02 THOUSAND/UL (ref 0–0.2)
IMM GRANULOCYTES NFR BLD AUTO: 0 % (ref 0–2)
LYMPHOCYTES # BLD AUTO: 2.42 THOUSANDS/ÂΜL (ref 0.6–4.47)
LYMPHOCYTES NFR BLD AUTO: 33 % (ref 14–44)
MCH RBC QN AUTO: 29.4 PG (ref 26.8–34.3)
MCHC RBC AUTO-ENTMCNC: 32.2 G/DL (ref 31.4–37.4)
MCV RBC AUTO: 92 FL (ref 82–98)
MONOCYTES # BLD AUTO: 0.55 THOUSAND/ÂΜL (ref 0.17–1.22)
MONOCYTES NFR BLD AUTO: 7 % (ref 4–12)
NEUTROPHILS # BLD AUTO: 4.23 THOUSANDS/ÂΜL (ref 1.85–7.62)
NEUTS SEG NFR BLD AUTO: 57 % (ref 43–75)
NRBC BLD AUTO-RTO: 0 /100 WBCS
P AXIS: 66 DEGREES
PLATELET # BLD AUTO: 288 THOUSANDS/UL (ref 149–390)
PMV BLD AUTO: 10.6 FL (ref 8.9–12.7)
POTASSIUM SERPL-SCNC: 4.3 MMOL/L (ref 3.5–5.3)
PR INTERVAL: 166 MS
QRS AXIS: 13 DEGREES
QRSD INTERVAL: 82 MS
QT INTERVAL: 410 MS
QTC INTERVAL: 423 MS
RBC # BLD AUTO: 4.45 MILLION/UL (ref 3.81–5.12)
SODIUM SERPL-SCNC: 138 MMOL/L (ref 135–147)
T WAVE AXIS: 26 DEGREES
VENTRICULAR RATE: 64 BPM
WBC # BLD AUTO: 7.39 THOUSAND/UL (ref 4.31–10.16)

## 2025-04-15 PROCEDURE — 93005 ELECTROCARDIOGRAM TRACING: CPT

## 2025-04-15 PROCEDURE — 93010 ELECTROCARDIOGRAM REPORT: CPT | Performed by: INTERNAL MEDICINE

## 2025-04-15 PROCEDURE — 85025 COMPLETE CBC W/AUTO DIFF WBC: CPT

## 2025-04-15 PROCEDURE — 36415 COLL VENOUS BLD VENIPUNCTURE: CPT

## 2025-04-15 PROCEDURE — 80048 BASIC METABOLIC PNL TOTAL CA: CPT

## 2025-04-15 NOTE — TELEPHONE ENCOUNTER
Caller: Patient    Doctor: Dr. Barron    Reason for call: Patient is requesting a call back to see if she can complete all labs (blood and EKG)  for up coming surgery // surgery date 05/12/2025    Call back#: [phone]

## 2025-04-16 ENCOUNTER — TELEPHONE (OUTPATIENT)
Dept: OBGYN CLINIC | Facility: MEDICAL CENTER | Age: 70
End: 2025-04-16

## 2025-04-16 DIAGNOSIS — M16.12 PRIMARY OSTEOARTHRITIS OF LEFT HIP: Primary | ICD-10-CM

## 2025-04-16 RX ORDER — ENOXAPARIN SODIUM 100 MG/ML
40 INJECTION SUBCUTANEOUS DAILY
Qty: 11.2 ML | Refills: 0 | Status: SHIPPED | OUTPATIENT
Start: 2025-04-16 | End: 2025-05-14

## 2025-04-16 NOTE — TELEPHONE ENCOUNTER
Patient returning call from ortho nurse navigator.   Patient can be reached at number below.    Call back: [phone]

## 2025-04-16 NOTE — TELEPHONE ENCOUNTER
Preoperative Elective Admission Assessment- spoke to patient     EKG/LAB/MRSA SWAB/CXR date: going 4/17    Living Situation:    Who does pt live with: spouse  What kind of home: multi-level  How do they enter the home: front  How many levels in home: 2  # of steps to enter home: 8  # of steps to second floor: 13  Are there handrails: Yes  Are there landings: No  Sleeping arrangement: first/entry floor- recommended for the first week  Where is Bathroom: 1/2 bath first floor; full bath second floor   Where is the tub or shower: second floor- tub shower   Toilet height? Concerns for low toilets - standard   Dogs or ther pets: small dog      First Floor Setup:   Is there a bathroom: Yes  Where would pt sleep: couch- only option on first floor      DME: ordering RW; advised to bring dos. Ordering cane, RTS , shower transfer bench.Pt does not want BSC- too cumbersome   We discussed clearing pathways in the home and making sure there is accessibly to use the walker, for example, removing throw rugs.      Patient's Current Level of Function: Ambulates: Independently and cane as needed     Post-op Caregiver: spouse  Caregiver Name and phone number for Inpatient discharge needs: Robbie Nichols (spouse) [phone]  Currently receive any HHC/aides/community supports: No     Post-op Transport: spouse  To/from hospital: spouse  To/from PT 2-3x/week: spouse  Uses community transport now: No     Outpatient Physical Therapy Site:  Site: [place]   pre and post-op appts scheduled? Yes     Medication Management: self  Preferred Pharmacy for Post-op Medications: [id number] - [place] - [id number] [[id number]]   Blood Management Vitamin Regimen: Pt confirms taking as prescribed  Post-op anticoagulant: Lovenox? Sent to to surgeon to order.   Has Bactroban for 5 days preop: Yes  Educated on Preoperative Bathing Instructions, and use of Soap for 5 days before surgery.      DC Plan: Pt plans to be discharged  home      Barriers to DC identified preoperatively: home layout    BMI: 27.72    Patient Education:  Pt educated on post-op pain, early mobilization (POD0), LOS goals, OP PT goals, and preoperative bathing. Patient educated that our goal is to appropriately discharge patient based off their post-op function while striving to maintain maximal independence. The goal is to discharge patient to home and for them to attend outpatient physical therapy.    Assigned to care team? Yes    SW referral: Yes- Patient concerned about home lay out. 8 steps to enter, 13 steps to the bedroom. We did discuss sleeping on the entry level for the first week- patient does not want to sleep on the couch. Patient states to have called insurance to see if rehab would be covered because that is her hope/plan. I did notify that it is not guaranteed and would place a SW referral

## 2025-04-16 NOTE — TELEPHONE ENCOUNTER
Called pt about her appointment, MARIA TERESA will not be available the afternoon of April 23 2025, Changed patient appointment. Patient also stated that she went to [facility] for blood work for her jorgito op appointment and they stated to her that it was too early to do the lab work. I stated to the patient that she can get her blood work done the day of the appointment , no fasting needed. Patient Understood,nothing further is needed.

## 2025-04-17 ENCOUNTER — OFFICE VISIT (OUTPATIENT)
Dept: INTERNAL MEDICINE CLINIC | Facility: CLINIC | Age: 70
End: 2025-04-17
Payer: COMMERCIAL

## 2025-04-17 ENCOUNTER — APPOINTMENT (OUTPATIENT)
Dept: LAB | Facility: HOSPITAL | Age: 70
End: 2025-04-17
Payer: COMMERCIAL

## 2025-04-17 VITALS
DIASTOLIC BLOOD PRESSURE: 100 MMHG | HEIGHT: 63 IN | OXYGEN SATURATION: 97 % | SYSTOLIC BLOOD PRESSURE: 160 MMHG | HEART RATE: 81 BPM | BODY MASS INDEX: 27.25 KG/M2 | WEIGHT: 153.8 LBS

## 2025-04-17 DIAGNOSIS — I10 PRIMARY HYPERTENSION: ICD-10-CM

## 2025-04-17 DIAGNOSIS — Z00.00 MEDICARE ANNUAL WELLNESS VISIT, SUBSEQUENT: Primary | ICD-10-CM

## 2025-04-17 DIAGNOSIS — M25.552 CHRONIC LEFT HIP PAIN: ICD-10-CM

## 2025-04-17 DIAGNOSIS — Z01.818 PRE-OP EVALUATION: Primary | ICD-10-CM

## 2025-04-17 DIAGNOSIS — G89.29 CHRONIC LEFT HIP PAIN: ICD-10-CM

## 2025-04-17 LAB
ALBUMIN SERPL BCG-MCNC: 4.3 G/DL (ref 3.5–5)
ALP SERPL-CCNC: 71 U/L (ref 34–104)
ALT SERPL W P-5'-P-CCNC: 12 U/L (ref 7–52)
ANION GAP SERPL CALCULATED.3IONS-SCNC: 7 MMOL/L (ref 4–13)
APTT PPP: 30 SECONDS (ref 23–34)
AST SERPL W P-5'-P-CCNC: 16 U/L (ref 13–39)
BASOPHILS # BLD AUTO: 0.04 THOUSANDS/ÂΜL (ref 0–0.1)
BASOPHILS NFR BLD AUTO: 1 % (ref 0–1)
BILIRUB SERPL-MCNC: 0.33 MG/DL (ref 0.2–1)
BUN SERPL-MCNC: 16 MG/DL (ref 5–25)
CALCIUM SERPL-MCNC: 9.6 MG/DL (ref 8.4–10.2)
CHLORIDE SERPL-SCNC: 102 MMOL/L (ref 96–108)
CO2 SERPL-SCNC: 28 MMOL/L (ref 21–32)
CREAT SERPL-MCNC: 0.62 MG/DL (ref 0.6–1.3)
DME PARACHUTE DELIVERY DATE REQUESTED: NORMAL
DME PARACHUTE ITEM DESCRIPTION: NORMAL
DME PARACHUTE ORDER STATUS: NORMAL
DME PARACHUTE SUPPLIER NAME: NORMAL
DME PARACHUTE SUPPLIER PHONE: NORMAL
EOSINOPHIL # BLD AUTO: 0.12 THOUSAND/ÂΜL (ref 0–0.61)
EOSINOPHIL NFR BLD AUTO: 2 % (ref 0–6)
ERYTHROCYTE [DISTWIDTH] IN BLOOD BY AUTOMATED COUNT: 13.6 % (ref 11.6–15.1)
EST. AVERAGE GLUCOSE BLD GHB EST-MCNC: 120 MG/DL
GFR SERPL CREATININE-BSD FRML MDRD: 92 ML/MIN/1.73SQ M
GLUCOSE P FAST SERPL-MCNC: 71 MG/DL (ref 65–99)
HBA1C MFR BLD: 5.8 %
HCT VFR BLD AUTO: 38.8 % (ref 34.8–46.1)
HGB BLD-MCNC: 12.6 G/DL (ref 11.5–15.4)
IMM GRANULOCYTES # BLD AUTO: 0.02 THOUSAND/UL (ref 0–0.2)
IMM GRANULOCYTES NFR BLD AUTO: 0 % (ref 0–2)
INR PPP: 0.92 (ref 0.85–1.19)
LYMPHOCYTES # BLD AUTO: 3.33 THOUSANDS/ÂΜL (ref 0.6–4.47)
LYMPHOCYTES NFR BLD AUTO: 45 % (ref 14–44)
MCH RBC QN AUTO: 29.7 PG (ref 26.8–34.3)
MCHC RBC AUTO-ENTMCNC: 32.5 G/DL (ref 31.4–37.4)
MCV RBC AUTO: 92 FL (ref 82–98)
MONOCYTES # BLD AUTO: 0.53 THOUSAND/ÂΜL (ref 0.17–1.22)
MONOCYTES NFR BLD AUTO: 7 % (ref 4–12)
NEUTROPHILS # BLD AUTO: 3.38 THOUSANDS/ÂΜL (ref 1.85–7.62)
NEUTS SEG NFR BLD AUTO: 45 % (ref 43–75)
NRBC BLD AUTO-RTO: 0 /100 WBCS
PLATELET # BLD AUTO: 291 THOUSANDS/UL (ref 149–390)
PMV BLD AUTO: 10.3 FL (ref 8.9–12.7)
POTASSIUM SERPL-SCNC: 4.2 MMOL/L (ref 3.5–5.3)
PROT SERPL-MCNC: 7.5 G/DL (ref 6.4–8.4)
PROTHROMBIN TIME: 13 SECONDS (ref 12.3–15)
RBC # BLD AUTO: 4.24 MILLION/UL (ref 3.81–5.12)
RETICS # AUTO: NORMAL 10*3/UL (ref 14097–95744)
RETICS # CALC: 1.6 % (ref 0.37–1.87)
SODIUM SERPL-SCNC: 137 MMOL/L (ref 135–147)
WBC # BLD AUTO: 7.42 THOUSAND/UL (ref 4.31–10.16)

## 2025-04-17 PROCEDURE — 86900 BLOOD TYPING SEROLOGIC ABO: CPT | Performed by: ORTHOPAEDIC SURGERY

## 2025-04-17 PROCEDURE — 85045 AUTOMATED RETICULOCYTE COUNT: CPT

## 2025-04-17 PROCEDURE — 99214 OFFICE O/P EST MOD 30 MIN: CPT | Performed by: INTERNAL MEDICINE

## 2025-04-17 PROCEDURE — 36415 COLL VENOUS BLD VENIPUNCTURE: CPT

## 2025-04-17 PROCEDURE — 86901 BLOOD TYPING SEROLOGIC RH(D): CPT | Performed by: ORTHOPAEDIC SURGERY

## 2025-04-17 PROCEDURE — 83550 IRON BINDING TEST: CPT

## 2025-04-17 PROCEDURE — 85730 THROMBOPLASTIN TIME PARTIAL: CPT

## 2025-04-17 PROCEDURE — 83540 ASSAY OF IRON: CPT

## 2025-04-17 PROCEDURE — 85610 PROTHROMBIN TIME: CPT

## 2025-04-17 PROCEDURE — 80053 COMPREHEN METABOLIC PANEL: CPT

## 2025-04-17 PROCEDURE — G2211 COMPLEX E/M VISIT ADD ON: HCPCS | Performed by: INTERNAL MEDICINE

## 2025-04-17 PROCEDURE — 82728 ASSAY OF FERRITIN: CPT

## 2025-04-17 PROCEDURE — 85025 COMPLETE CBC W/AUTO DIFF WBC: CPT

## 2025-04-17 PROCEDURE — 87081 CULTURE SCREEN ONLY: CPT

## 2025-04-17 PROCEDURE — 83036 HEMOGLOBIN GLYCOSYLATED A1C: CPT

## 2025-04-17 PROCEDURE — G0439 PPPS, SUBSEQ VISIT: HCPCS | Performed by: INTERNAL MEDICINE

## 2025-04-17 PROCEDURE — 86850 RBC ANTIBODY SCREEN: CPT | Performed by: ORTHOPAEDIC SURGERY

## 2025-04-17 NOTE — PROGRESS NOTES
Name: Sheryl Nichols      : 1955      MRN: [id number]  Encounter Provider: Gómez Peralta MD  Encounter Date: 2025   Encounter department: [facility]  :  Assessment & Plan  Medicare annual wellness visit, subsequent  Completed.    Due for colonoscopy, mammo, DEXA, labs.       Primary hypertension  High on several occasions, discussed use of anti-hypertensive medications, will call me with log of BP's.       Chronic left hip pain  Scheduled for left THR on .           Depression Screening and Follow-up Plan: Patient was screened for depression during today's encounter. They screened negative with a PHQ-2 score of 0.      Urinary Incontinence Plan of Care: counseling topics discussed: practice Kegel (pelvic floor strengthening) exercises, limit alcohol, caffeine, spicy foods, and acidic foods, limiting fluid intake 3-4 hours before bed and preventing constipation.       Preventive health issues were discussed with patient, and age appropriate screening tests were ordered as noted in patient's After Visit Summary. Personalized health advice and appropriate referrals for health education or preventive services given if needed, as noted in patient's After Visit Summary.    History of Present Illness     AWV and f/u.       Patient Care Team:  Gómez Peralta MD as PCP - General (Internal Medicine)  MARIA TERESA Alfred, RN as Nurse Navigator (Orthopedics)    Review of Systems   Constitutional:  Negative for chills and fever.   HENT:  Negative for ear pain and sore throat.    Eyes:  Negative for pain and visual disturbance.   Respiratory:  Negative for cough and shortness of breath.    Cardiovascular:  Negative for chest pain and palpitations.   Gastrointestinal:  Negative for abdominal pain and vomiting.   Genitourinary:  Negative for dysuria and hematuria.   Musculoskeletal:  Negative for arthralgias and back pain.   Skin:  Negative for color  change and rash.   Neurological:  Negative for seizures and syncope.   All other systems reviewed and are negative.    Medical History Reviewed by provider this encounter:  Tobacco  Allergies  Meds  Problems  Med Hx  Surg Hx  Fam Hx       Annual Wellness Visit Questionnaire   Sheryl is here for her Subsequent Wellness visit. Last Medicare Wellness visit information reviewed, patient interviewed and updates made to the record today.      Health Risk Assessment:   Patient rates overall health as good. Patient feels that their physical health rating is much worse. Patient is satisfied with their life. Eyesight was rated as same. Hearing was rated as same. Patient feels that their emotional and mental health rating is same. Patients states they are never, rarely angry. Patient states they are often unusually tired/fatigued. Pain experienced in the last 7 days has been a lot. Patient's pain rating has been 9/10. Patient states that she has experienced no weight loss or gain in last 6 months.     Depression Screening:   PHQ-2 Score: 0      Fall Risk Screening:   In the past year, patient has experienced: no history of falling in past year      Urinary Incontinence Screening:   Patient has leaked urine accidently in the last six months.     Home Safety:  Patient has trouble with stairs inside or outside of their home. Patient has working smoke alarms and has working carbon monoxide detector. Home safety hazards include: none.     Nutrition:   Current diet is Regular and Limited junk food.     Medications:   Patient is currently taking over-the-counter supplements. OTC medications include: see medication list. Patient is able to manage medications.     Activities of Daily Living (ADLs)/Instrumental Activities of Daily Living (IADLs):   Walk and transfer into and out of bed and chair?: Yes  Dress and groom yourself?: Yes    Bathe or shower yourself?: Yes    Feed yourself? Yes  Do your laundry/housekeeping?:  Yes  Manage your money, pay your bills and track your expenses?: Yes  Make your own meals?: Yes    Do your own shopping?: No    Previous Hospitalizations:   Any hospitalizations or ED visits within the last 12 months?: No      Advance Care Planning:   Living will: No    Durable POA for healthcare: Yes    Advanced directive: No      Cognitive Screening:   Provider or family/friend/caregiver concerned regarding cognition?: No    Preventive Screenings      Cardiovascular Screening:    General: History Lipid Disorder and Risks and Benefits Discussed    Due for: Lipid Panel      Diabetes Screening:     General: Screening Current      Colorectal Cancer Screening:     General: Risks and Benefits Discussed    Due for: Cologuard      Breast Cancer Screening:     General: Screening Current and Risks and Benefits Discussed    Due for: Mammogram        Cervical Cancer Screening:    General: Screening Not Indicated      Osteoporosis Screening:    General: Risks and Benefits Discussed    Due for: DXA Appendicular      Abdominal Aortic Aneurysm (AAA) Screening:        General: Screening Not Indicated      Lung Cancer Screening:     General: Screening Not Indicated      Hepatitis C Screening:    General: Screening Current    Immunizations:  - Immunizations due: Influenza and Zoster (Shingrix)    Screening, Brief Intervention, and Referral to Treatment (SBIRT)     Screening  Typical number of drinks in a day: 0  Typical number of drinks in a week: 2  Interpretation: Low risk drinking behavior.    AUDIT-C Screenin) How often did you have a drink containing alcohol in the past year? 2 to 3 times a week  2) How many drinks did you have on a typical day when you were drinking in the past year? 0  3) How often did you have 6 or more drinks on one occasion in the past year? never    AUDIT-C Score: 3  Interpretation: Score 3-12 (female): POSITIVE screen for alcohol misuse    AUDIT Screenin) How often during the last year have you  found that you were not able to stop drinking once you had started? 0 - never  5) How often during the last year have you failed to do what was normally expected from you because of drinking? 0 - never  6) How often during the last year have you needed a first drink in the morning to get yourself going after a heavy drinking session? 0 - never  7) How often during the last year have you had a feeling of guilt or remorse after drinking? 0 - never  8) How often during the last year have you been unable to remember what happened the night before because you had been drinking? 0 - never  9) Have you or someone else been injured as a result of your drinking? 0 - no  10) Has a relative or friend or a doctor or another health worker been concerned about your drinking or suggested you cut down? 0 - no    AUDIT Score: 3  Interpretation: Low risk alcohol consumption    Single Item Drug Screening:  How often have you used an illegal drug (including marijuana) or a prescription medication for non-medical reasons in the past year? never    Single Item Drug Screen Score: 0  Interpretation: Negative screen for possible drug use disorder    Brief Intervention  Alcohol & drug use screenings were reviewed. No concerns regarding substance use disorder identified.     Other Counseling Topics:   Car/seat belt/driving safety.     Social Drivers of Health     Financial Resource Strain: Low Risk  (3/6/2023)    Overall Financial Resource Strain (CARDIA)     Difficulty of Paying Living Expenses: Not very hard   Food Insecurity: No Food Insecurity (4/14/2025)    Hunger Vital Sign     Worried About Running Out of Food in the Last Year: Never true     Ran Out of Food in the Last Year: Never true   Transportation Needs: No Transportation Needs (4/14/2025)    PRAPARE - Transportation     Lack of Transportation (Medical): No     Lack of Transportation (Non-Medical): No   Housing Stability: Unknown (4/14/2025)    Housing Stability Vital Sign      "Unable to Pay for Housing in the Last Year: Patient declined     Number of Times Moved in the Last Year: 0     Homeless in the Last Year: No   Utilities: Not At Risk (4/14/2025)    [facility] Utilities     Threatened with loss of utilities: No     No results found.    Objective   /100   Pulse 81   Ht 5' 2.5\" (1.588 m)   Wt 69.8 kg (153 lb 12.8 oz)   LMP  (LMP Unknown)   SpO2 97%   BMI 27.68 kg/m²     Physical Exam  Vitals and nursing note reviewed.   Constitutional:       General: She is not in acute distress.     Appearance: She is well-developed.   HENT:      Head: Normocephalic and atraumatic.   Eyes:      Conjunctiva/sclera: Conjunctivae normal.   Cardiovascular:      Rate and Rhythm: Normal rate and regular rhythm.      Heart sounds: No murmur heard.  Pulmonary:      Effort: Pulmonary effort is normal. No respiratory distress.      Breath sounds: Normal breath sounds.   Abdominal:      Palpations: Abdomen is soft.      Tenderness: There is no abdominal tenderness.   Musculoskeletal:         General: No swelling.      Cervical back: Neck supple.   Skin:     General: Skin is warm and dry.      Capillary Refill: Capillary refill takes less than 2 seconds.   Neurological:      Mental Status: She is alert.   Psychiatric:         Mood and Affect: Mood normal.         "

## 2025-04-17 NOTE — PATIENT INSTRUCTIONS
Medicare Preventive Visit Patient Instructions  Thank you for completing your Welcome to Medicare Visit or Medicare Annual Wellness Visit today. Your next wellness visit will be due in one year (4/18/2026).  The screening/preventive services that you may require over the next 5-10 years are detailed below. Some tests may not apply to you based off risk factors and/or age. Screening tests ordered at today's visit but not completed yet may show as past due. Also, please note that scanned in results may not display below.  Preventive Screenings:  Service Recommendations Previous Testing/Comments   Colorectal Cancer Screening  * Colonoscopy    * Fecal Occult Blood Test (FOBT)/Fecal Immunochemical Test (FIT)  * Fecal DNA/Cologuard Test  * Flexible Sigmoidoscopy Age: 45-75 years old   Colonoscopy: every 10 years (may be performed more frequently if at higher risk)  OR  FOBT/FIT: every 1 year  OR  Cologuard: every 3 years  OR  Sigmoidoscopy: every 5 years  Screening may be recommended earlier than age 45 if at higher risk for colorectal cancer. Also, an individualized decision between you and your healthcare provider will decide whether screening between the ages of 76-85 would be appropriate. Colonoscopy: 03/21/2012  FOBT/FIT: Not on file  Cologuard: Not on file  Sigmoidoscopy: Not on file          Breast Cancer Screening Age: 40+ years old  Frequency: every 1-2 years  Not required if history of left and right mastectomy Mammogram: 05/04/2023    Screening Current   Cervical Cancer Screening Between the ages of 21-29, pap smear recommended once every 3 years.   Between the ages of 30-65, can perform pap smear with HPV co-testing every 5 years.   Recommendations may differ for women with a history of total hysterectomy, cervical cancer, or abnormal pap smears in past. Pap Smear: 06/11/2019    Screening Not Indicated   Hepatitis C Screening Once for adults born between 1945 and 1965  More frequently in patients at high risk  for Hepatitis C Hep C Antibody: 03/10/2022    Screening Current   Diabetes Screening 1-2 times per year if you're at risk for diabetes or have pre-diabetes Fasting glucose: 85 mg/dL (4/15/2025)  A1C: 5.6 % (10/8/2024)  Screening Current   Cholesterol Screening Once every 5 years if you don't have a lipid disorder. May order more often based on risk factors. Lipid panel: 10/08/2024    Screening Not Indicated  History Lipid Disorder     Other Preventive Screenings Covered by Medicare:  Abdominal Aortic Aneurysm (AAA) Screening: covered once if your at risk. You're considered to be at risk if you have a family history of AAA.  Lung Cancer Screening: covers low dose CT scan once per year if you meet all of the following conditions: (1) Age 55-77; (2) No signs or symptoms of lung cancer; (3) Current smoker or have quit smoking within the last 15 years; (4) You have a tobacco smoking history of at least 20 pack years (packs per day multiplied by number of years you smoked); (5) You get a written order from a healthcare provider.  Glaucoma Screening: covered annually if you're considered high risk: (1) You have diabetes OR (2) Family history of glaucoma OR (3)  aged 50 and older OR (4)  American aged 65 and older  Osteoporosis Screening: covered every 2 years if you meet one of the following conditions: (1) You're estrogen deficient and at risk for osteoporosis based off medical history and other findings; (2) Have a vertebral abnormality; (3) On glucocorticoid therapy for more than 3 months; (4) Have primary hyperparathyroidism; (5) On osteoporosis medications and need to assess response to drug therapy.   Last bone density test (DXA Scan): 07/16/2018.  HIV Screening: covered annually if you're between the age of 15-65. Also covered annually if you are younger than 15 and older than 65 with risk factors for HIV infection. For pregnant patients, it is covered up to 3 times per  pregnancy.    Immunizations:  Immunization Recommendations   Influenza Vaccine Annual influenza vaccination during flu season is recommended for all persons aged >= 6 months who do not have contraindications   Pneumococcal Vaccine   * Pneumococcal conjugate vaccine = PCV13 (Prevnar 13), PCV15 (Vaxneuvance), PCV20 (Prevnar 20)  * Pneumococcal polysaccharide vaccine = PPSV23 (Pneumovax) Adults 19-63 yo with certain risk factors or if 65+ yo  If never received any pneumonia vaccine: recommend Prevnar 20 (PCV20)  Give PCV20 if previously received 1 dose of PCV13 or PPSV23   Hepatitis B Vaccine 3 dose series if at intermediate or high risk (ex: diabetes, end stage renal disease, liver disease)   Respiratory syncytial virus (RSV) Vaccine - COVERED BY MEDICARE PART D  * RSVPreF3 (Arexvy) CDC recommends that adults 60 years of age and older may receive a single dose of RSV vaccine using shared clinical decision-making (SCDM)   Tetanus (Td) Vaccine - COST NOT COVERED BY MEDICARE PART B Following completion of primary series, a booster dose should be given every 10 years to maintain immunity against tetanus. Td may also be given as tetanus wound prophylaxis.   Tdap Vaccine - COST NOT COVERED BY MEDICARE PART B Recommended at least once for all adults. For pregnant patients, recommended with each pregnancy.   Shingles Vaccine (Shingrix) - COST NOT COVERED BY MEDICARE PART B  2 shot series recommended in those 19 years and older who have or will have weakened immune systems or those 50 years and older     Health Maintenance Due:      Topic Date Due   • Colorectal Cancer Screening  04/29/2023   • Breast Cancer Screening: Mammogram  05/04/2024   • Hepatitis C Screening  Completed     Immunizations Due:      Topic Date Due   • Influenza Vaccine (1) Never done   • COVID-19 Vaccine (1 - 2024-25 season) Never done     Advance Directives   What are advance directives?  Advance directives are legal documents that state your wishes and  plans for medical care. These plans are made ahead of time in case you lose your ability to make decisions for yourself. Advance directives can apply to any medical decision, such as the treatments you want, and if you want to donate organs.   What are the types of advance directives?  There are many types of advance directives, and each state has rules about how to use them. You may choose a combination of any of the following:  Living will:  This is a written record of the treatment you want. You can also choose which treatments you do not want, which to limit, and which to stop at a certain time. This includes surgery, medicine, IV fluid, and tube feedings.   Durable power of  for healthcare (DPAHC):  This is a written record that states who you want to make healthcare choices for you when you are unable to make them for yourself. This person, called a proxy, is usually a family member or a friend. You may choose more than 1 proxy.  Do not resuscitate (DNR) order:  A DNR order is used in case your heart stops beating or you stop breathing. It is a request not to have certain forms of treatment, such as CPR. A DNR order may be included in other types of advance directives.  Medical directive:  This covers the care that you want if you are in a coma, near death, or unable to make decisions for yourself. You can list the treatments you want for each condition. Treatment may include pain medicine, surgery, blood transfusions, dialysis, IV or tube feedings, and a ventilator (breathing machine).  Values history:  This document has questions about your views, beliefs, and how you feel and think about life. This information can help others choose the care that you would choose.  Why are advance directives important?  An advance directive helps you control your care. Although spoken wishes may be used, it is better to have your wishes written down. Spoken wishes can be misunderstood, or not followed. Treatments  may be given even if you do not want them. An advance directive may make it easier for your family to make difficult choices about your care.   Urinary Incontinence   Urinary incontinence (UI)  is when you lose control of your bladder. UI develops because your bladder cannot store or empty urine properly. The 3 most common types of UI are stress incontinence, urge incontinence, or both.  Medicines:   May be given to help strengthen your bladder control. Report any side effects of medication to your healthcare provider.  Do pelvic muscle exercises often:  Your pelvic muscles help you stop urinating. Squeeze these muscles tight for 5 seconds, then relax for 5 seconds. Gradually work up to squeezing for 10 seconds. Do 3 sets of 15 repetitions a day, or as directed. This will help strengthen your pelvic muscles and improve bladder control.  Train your bladder:  Go to the bathroom at set times, such as every 2 hours, even if you do not feel the urge to go. You can also try to hold your urine when you feel the urge to go. For example, hold your urine for 5 minutes when you feel the urge to go. As that becomes easier, hold your urine for 10 minutes.   Self-care:   Keep a UI record.  Write down how often you leak urine and how much you leak. Make a note of what you were doing when you leaked urine.  Drink liquids as directed. You may need to limit the amount of liquid you drink to help control your urine leakage. Do not drink any liquid right before you go to bed. Limit or do not have drinks that contain caffeine or alcohol.   Prevent constipation.  Eat a variety of high-fiber foods. Good examples are high-fiber cereals, beans, vegetables, and whole-grain breads. Walking is the best way to trigger your intestines to have a bowel movement.  Exercise regularly and maintain a healthy weight.  Weight loss and exercise will decrease pressure on your bladder and help you control your leakage.   Use a catheter as directed  to help  empty your bladder. A catheter is a tiny, plastic tube that is put into your bladder to drain your urine.   Go to behavior therapy as directed.  Behavior therapy may be used to help you learn to control your urge to urinate.    Weight Management   Why it is important to manage your weight:  Being overweight increases your risk of health conditions such as heart disease, high blood pressure, type 2 diabetes, and certain types of cancer. It can also increase your risk for osteoarthritis, sleep apnea, and other respiratory problems. Aim for a slow, steady weight loss. Even a small amount of weight loss can lower your risk of health problems.  How to lose weight safely:  A safe and healthy way to lose weight is to eat fewer calories and get regular exercise. You can lose up about 1 pound a week by decreasing the number of calories you eat by 500 calories each day.   Healthy meal plan for weight management:  A healthy meal plan includes a variety of foods, contains fewer calories, and helps you stay healthy. A healthy meal plan includes the following:  Eat whole-grain foods more often.  A healthy meal plan should contain fiber. Fiber is the part of grains, fruits, and vegetables that is not broken down by your body. Whole-grain foods are healthy and provide extra fiber in your diet. Some examples of whole-grain foods are whole-wheat breads and pastas, oatmeal, brown rice, and bulgur.  Eat a variety of vegetables every day.  Include dark, leafy greens such as spinach, kale, bassam greens, and mustard greens. Eat yellow and orange vegetables such as carrots, sweet potatoes, and winter squash.   Eat a variety of fruits every day.  Choose fresh or canned fruit (canned in its own juice or light syrup) instead of juice. Fruit juice has very little or no fiber.  Eat low-fat dairy foods.  Drink fat-free (skim) milk or 1% milk. Eat fat-free yogurt and low-fat cottage cheese. Try low-fat cheeses such as mozzarella and other  "reduced-fat cheeses.  Choose meat and other protein foods that are low in fat.  Choose beans or other legumes such as split peas or lentils. Choose fish, skinless poultry (chicken or turkey), or lean cuts of red meat (beef or pork). Before you cook meat or poultry, cut off any visible fat.   Use less fat and oil.  Try baking foods instead of frying them. Add less fat, such as margarine, sour cream, regular salad dressing and mayonnaise to foods. Eat fewer high-fat foods. Some examples of high-fat foods include french fries, doughnuts, ice cream, and cakes.  Eat fewer sweets.  Limit foods and drinks that are high in sugar. This includes candy, cookies, regular soda, and sweetened drinks.  Exercise:  Exercise at least 30 minutes per day on most days of the week. Some examples of exercise include walking, biking, dancing, and swimming. You can also fit in more physical activity by taking the stairs instead of the elevator or parking farther away from stores. Ask your healthcare provider about the best exercise plan for you.   Alcohol Use and Your Health    Drinking too much can harm your health.  Excessive alcohol use leads to about 88,000 death in the [place] each year, and shortens the life of those who diet by almost 30 years.  Further, excessive drinking cost the economy $249 billion in 2010.  Most excessive drinkers are not alcohol dependent.    Excessive alcohol use has immediate effects that increase the risk of many harmful health conditions.  These are most often the result of binge drinking.  Over time, excessive alcohol use can lead to the development of chronic diseases and other series health problems.    What is considered a \"drink\"?        Excessive alcohol use includes:  Binge Drinking: For women, 4 or more drinks consumed on one occasion. For men, 5 or more drinks consumed on one occasion.  Heavy Drinking: For women, 8 or more drinks per week. For men, 15 or more drinks per week  Any alcohol used " by pregnant women  Any alcohol used by those under the age of 21 years    If you choose to drink, do so in moderation:  Do not drink at all if you are under the age of 21, or if you are or may be pregnant, or have health problems that could be made worse by drinking.  For women, up to 1 drink per day  For men, up to 2 drinks a day    No one should begin drinking or drink more frequently based on potential health benefits    Short-Term Health Risks:  Injuries: motor vehicle crashes, falls, drownings, burns  Violence: homicide, suicide, sexual assault, intimate partner violence  Alcohol poisoning  Reproductive health: risky sexual behaviors, unintended prengnacy, sexually transmitted diseases, miscarriage, stillbirth, fetal alcohol syndrome    Long-Term Health Risks:  Chronic diseases: high blood pressure, heart disease, stroke, liver disease, digestive problems  Cancers: breast, mouth and throat, liver, colon  Learning and memory problems: dementia, poor school performance  Mental health: depression, anxiety, insomnia  Social problems: lost productivity, family problems, unemployment  Alcohol dependence    For support and more information:  Substance Abuse and Mental Health Services Administration  [address]  [place] [address]  Web Address: http://www.[facility].gov    Alcoholics Anonymous        Web Address: http://www.aa.org    https://www.cdc.gov/alcohol/fact-sheets/alcohol-use.htm     © Copyright [employer] 2018 Information is for End User's use only and may not be sold, redistributed or otherwise used for commercial purposes. All illustrations and images included in CareNotes® are the copyrighted property of [employer] Inc. or [employer]

## 2025-04-18 ENCOUNTER — LAB REQUISITION (OUTPATIENT)
Dept: LAB | Facility: HOSPITAL | Age: 70
End: 2025-04-18
Payer: COMMERCIAL

## 2025-04-18 DIAGNOSIS — Z01.818 ENCOUNTER FOR OTHER PREPROCEDURAL EXAMINATION: ICD-10-CM

## 2025-04-18 LAB
ABO GROUP BLD: NORMAL
BLD GP AB SCN SERPL QL: NEGATIVE
FERRITIN SERPL-MCNC: 99 NG/ML (ref 30–307)
IRON SATN MFR SERPL: 21 % (ref 15–50)
IRON SERPL-MCNC: 64 UG/DL (ref 50–212)
MRSA NOSE QL CULT: NORMAL
RH BLD: POSITIVE
SPECIMEN EXPIRATION DATE: NORMAL
TIBC SERPL-MCNC: 302.4 UG/DL (ref 250–450)
TRANSFERRIN SERPL-MCNC: 216 MG/DL (ref 203–362)
UIBC SERPL-MCNC: 238 UG/DL (ref 155–355)

## 2025-04-21 PROBLEM — R73.03 PREDIABETES: Status: ACTIVE | Noted: 2025-04-21

## 2025-04-21 NOTE — PATIENT INSTRUCTIONS
BEFORE SURGERY    Contact your surgical nurse navigator or surgical provider with any questions regarding preoperative plan or schedule.  Stop all over the counter supplements, herbal, naturopathic  medications for 1 week prior to surgery UNLESS prescribed by your surgeon  Hold NSAIDS (i.e. advil, alleve, motrin, ibuprofen, celebrex) minimum 5 days prior to surgery  Follow presurgical medication instructions provided by preadmission nursing team reviewed during your presurgery phone call  Strategies for optimizing your surgery through breathing exercises, nutrition and physical activity can be found at [email or web address]  Call [phone] with any presurgical concerns or medications questions or use the messaging feature in your [employer] deanna to contact your provider    AFTER SURGERY    Recommend using Tylenol ( acetaminophen ) 1000 mg every eight hours during the first week post discharge along with icing the area for 20 mins every 3-4 hours while awake can be helpful in reducing your need for post operative opioid use. This opioid sparing plan can be used along side your surgeons pain plan.  Use stool softener over the counter (colace) daily after surgery during the first 1-2 weeks to avoid post operative constipation issues  If no bowel movement within 3 days after surgery then use over the counter Miralax in addition to your stool softener   If cleared by your surgical team for activity then early and often walking is encouraged and can be important in prevention of post surgical blood clots. Additionally spend as much time out of bed as possible and allowed by your surgical team  Use your incentive spirometer twice per hour in the first seven days after surgery to help prevent post surgery lung complications and infections  It is very important you follow the instructions from your surgeon regarding any medications for after surgery blood clot prevention. Compliance with these medications or interventions is very  important.  Call [phone] with any post discharge concerns or medical issues or use the messaging feature in your [employer] deanna to contact your provider

## 2025-04-21 NOTE — PROGRESS NOTES
Internal Medicine Pre-Operative Evaluation:     Reason for Visit: Pre-operative Evaluation for Risk Stratification and Optimization    Patient ID: Sheryl Nichols is a 69 y.o. female.     Future cases for Carey Nichols [[id number]]       Case ID Status Date Time Kar Procedure Provider Location    [id number] [facility] 5/12/2025  3:30  Left total hip arthroplasty, all associated procedures as indicated Chester Barron MD [[id number]] [facility]               Recommendations to Proceed withSurgery    Patient is considered to be Low risk for Medium risk procedure.     After evaluation and discussion with patient with emphasis that all surgery has some degree of inherent risk it is acknowledged by patient this risk is Acceptable.    Patient is optimized and may proceed with planned procedure.     Assessment    Pre-operative Medical Evaluation for planned surgery  Recommendations as listed in PLAN section below  Contact surgical nurse  navigator with any questions regarding preoperative plan or schedule.      Assessment & Plan  Preoperative clearance    Primary osteoarthritis of left hip  Failed outpatient conservative measures  Electing to undergo surgical procedure as stated above    Prediabetes  Hgb A1c   Lab Results   Component Value Date    HGBA1C 5.8 (H) 04/17/2025     Recommend following DM diet             Plan:     1. Further preoperative workup as follows:   - none no further testing required may proceed with surgery    2. Preoperative Medication Management Review performed by PAT nursing  YES    3. Patient requires further consultation with:   No Consults Required    4. Discharge Planning / Barriers to Discharge  none identified - patients has post discharge therapy plan in place, transportation arranged for discharge day, adequate family support at home to assist with discharge to home.        Subjective:           History of Present Illness:     Sheryl Nichols is a 69 y.o. female who  presents to the office today for a preoperative consultation at the request of surgeon. The patient understands this is an elective procedure and not emergent. They are electing to undergo planned procedure with an understanding that all surgery has inherent risk. They have worked with their surgeon and failed conservative treatment measures. Today they present for preoperative risk assessment and recommendations for optimization in preparation for surgery.    Pt seen in center for perioperative medicine for upcoming proposed surgery. They have failed previous conservative measures and have elected surgical intervention.     Pt meets presurgical lab and BMI optimization goals. Pt EKG listed as abnormal however her functional capacity is excellent.       Sheyrl Nichols has an IN HOSPITAL cardiac risk of RCI RISK CLASS I (0 risk factors, risk of major cardiac compl. appr. 0.5%) based on RCRI calculator    Cardiac Risk Estimation: per the Revised Cardiac Risk Index (Circ. 100:1043, 1999),         Pre-op Exam    Previous history of bleeding disorders or clots?: No  Previous Anesthesia reaction?: No  Prolonged steroid use in the last 6 months?: No    Assessment of Cardiac Risk:   - Unstable or severe angina or MI in the last 6 weeks or history of stent placement in the last year?: No   - Decompensated heart failure (e.g. New onset heart failure, NYHA  Class IV heart failure, or worsening existing heart failure)?: No  - Significant arrhythmias such as high grade AV block, symptomatic ventricular arrhythmia, newly recognized ventricular tachycardia, supraventricular tachycardia with resting heart rate >100, or symptomatic bradycardia?: No  - Severe heart valve disease including aortic stenosis or symptomatic mitral stenosis?: No      Pre-operative Risk Factors:  Elevated-risk surgery: No    History of cerebrovascular disease: No    History of ischemic heart disease: No  Pre-operative treatment with insulin:  "No  Pre-operative creatinine >2 mg/dL: No    History of congestive heart failure: No    Duke Activity Status Index (DASI):   DASI Total Score: 23.45  METs: 5.6        ROS: No TIA's or unusual headaches, no dysphagia.  No prolonged cough. No dyspnea or chest pain on exertion.  No abdominal pain, change in bowel habits, black or bloody stools.  No urinary tract or BPH symptoms.  Positive reported pain in arthritic joint. Positive difficulty with gait. No skin rashes or issues.      Objective:    /84 (BP Location: Left arm, Patient Position: Sitting, Cuff Size: Standard)   Ht 5' 2.5\" (1.588 m)   Wt 68.9 kg (152 lb)   LMP  (LMP Unknown)   BMI 27.36 kg/m²       General Appearance: no distress, conversive  HEENT: PERRLA, conjuctiva normal; oropharynx clear; mucous membranes moist;   Neck:  Supple, no lymphadenopathy or thyromegaly  Lungs: breath sounds normal, normal respiratory effort, no retractions, expiratory effort normal  CV: normal heart sounds S1/S2, PMI normal   ABD: soft non tender, no masses , no hepatic or splenomegaly  EXT: DP pulses intact, no lymphadenopathy, no edema  Skin: normal turgor, normal texture, no rash  Psych: affect normal, mood normal  Neuro: AAOx3        The following portions of the patient's history were reviewed and updated as appropriate: allergies, current medications, past family history, past medical history, past social history, past surgical history and problem list.     Past History:       Past Medical History:   Diagnosis Date    Arthritis     Vocal cord nodules     removed    Past Surgical History:   Procedure Laterality Date     SECTION      COLONOSCOPY      LARYNGOSCOPY      with stripping of vocal cords          Social History     Tobacco Use    Smoking status: Former     Current packs/day: 0.00     Average packs/day: 0.5 packs/day for 30.0 years (15.0 ttl pk-yrs)     Types: Cigarettes     Start date: 1971     Quit date: 2001     Years since quitting: " "24.3    Smokeless tobacco: Never   Vaping Use    Vaping status: Never Used   Substance Use Topics    Alcohol use: Yes     Alcohol/week: 2.0 standard drinks of alcohol     Types: 2 Glasses of wine per week     Comment: weekly    Drug use: No     Family History   Problem Relation Age of Onset    Lung cancer Mother     Breast cancer Mother 45    Diabetes type I Mother     Lung cancer Father     Cancer Father     Diabetes type I Father     Diabetes Daughter         mellitus    Diabetes Son         mellitus    Mental illness Son         Recently had a change in medication    No Known Problems Sister     No Known Problems Maternal Grandmother     No Known Problems Paternal Grandmother     No Known Problems Daughter     Stroke Son         Recently experienced another stroke    Diabetes Son           Allergies:     No Known Allergies     Current Medications:     Current Outpatient Medications   Medication Instructions    ascorbic acid (VITAMIN C) 500 mg, Oral, 2 times daily    enoxaparin (LOVENOX) 40 mg, Subcutaneous, Daily, To start Post-Op    ferrous sulfate 324 mg, Oral, Daily before breakfast    folic acid (FOLVITE) 1 mg, Oral, Daily    Multiple Vitamin (Daily-Nicanor Multivitamin) TABS 1 tablet, Daily    multivitamin-minerals (CENTRUM) tablet Take by mouth     mupirocin (BACTROBAN) 2 % ointment Apply topically to the inside of the left and right nostrils twice daily for 5 days before surgery, including the morning of surgery.    Vitamin D-Vitamin K (VITAMIN K2-VITAMIN D3 PO) Take by mouth           PRE-OP WORKSHEET DATA    Assessment of Pre-Operative Risks     MLJ Quality Hard Stops:    BMI (<40) : Estimated body mass index is 27.36 kg/m² as calculated from the following:    Height as of this encounter: 5' 2.5\" (1.588 m).    Weight as of this encounter: 68.9 kg (152 lb).    Hgb ( >11):   Lab Results   Component Value Date    HGB 12.6 04/17/2025    HGB 13.1 04/15/2025    HGB 13.1 10/08/2024       HbA1c (<7.5) :   Lab " Results   Component Value Date    HGBA1C 5.8 (H) 04/17/2025       GFR (>60) (Less then 45 = Nephrology consult):    Lab Results   Component Value Date    EGFR 92 04/17/2025    EGFR 94 04/15/2025    EGFR 92 10/08/2024            Pre-Op Data Reviewed:       Laboratory Results: I have personally reviewed the pertinent reports    EKG: I personally reviewed and interpreted available tracings in the electronic medical record    Encounter Date: 04/15/25   ECG 12 lead   Result Value    Ventricular Rate 64    Atrial Rate 64    AK Interval 166    QRSD Interval 82    QT Interval 410    QTC Interval 423    P Axis 66    QRS Axis 13    T Wave Axis 26    Narrative    Normal sinus rhythm  Low voltage QRS  Cannot rule out Anterior infarct , age undetermined  No previous ECGs available  Confirmed by Roney Fowler ([id number]) on 4/15/2025 2:21:19 PM       OLD RECORDS: reviewed old records in the chart review section if EHR on day of visit.    Previous cardiopulmonary studies within the past year:  Echocardiogram: no   Cardiac Catheterization: no  Stress Test: no      Time of visit including pre-visit chart review, visit and post-visit coordination of plan and care , review of pre-surgical lab work, preparation and time spent documenting note in electronic medical record, time spent face-to-face in physical examination answering patient questions by care team 35 minutes             [facility]

## 2025-04-21 NOTE — PRE-PROCEDURE INSTRUCTIONS
Pre-Surgery Instructions:   Medication Instructions    ascorbic acid (VITAMIN C) 500 MG tablet Hold day of surgery.    ferrous sulfate 324 (65 Fe) mg Hold day of surgery.    folic acid (FOLVITE) 1 mg tablet Hold day of surgery.    Multiple Vitamin (Daily-Nicanor Multivitamin) TABS Hold day of surgery.    multivitamin-minerals (CENTRUM) tablet Stop taking 7 days prior to surgery.    Vitamin D-Vitamin K (VITAMIN K2-VITAMIN D3 PO) Stop taking 7 days prior to surgery.   Medication instructions for day of surgery reviewed. Please take all instructed medications with only a sip of water.       You will receive a call one business day prior to surgery with an arrival time and hospital directions. If your surgery is scheduled on a Monday, the hospital will be calling you on the Friday prior to your surgery. If you have not heard from anyone by 8pm, please call the hospital supervisor through the hospital  at [phone]. ([place] [phone] or [place] [phone]).    Do not eat or drink anything after midnight the night before your surgery, including candy, mints, lifesavers, or chewing gum. Do not drink alcohol 24hrs before your surgery. Try not to smoke at least 24hrs before your surgery.       Follow the pre surgery showering instructions as listed in the “My Surgical Experience Booklet” or otherwise provided by your surgeon's office. Do not use a blade to shave the surgical area 1 week before surgery. It is okay to use a clean electric clippers up to 24 hours before surgery. Do not apply any lotions, creams, including makeup, cologne, deodorant, or perfumes after showering on the day of your surgery. Do not use dry shampoo, hair spray, hair gel, or any type of hair products.     No contact lenses, eye make-up, or artificial eyelashes. Remove nail polish, including gel polish, and any artificial, gel, or acrylic nails if possible. Remove all jewelry including rings and body piercing jewelry.     Wear causal  clothing that is easy to take on and off. Consider your type of surgery.    Keep any valuables, jewelry, piercings at home. Please bring any specially ordered equipment (sling, braces) if indicated.    Arrange for a responsible person to drive you to and from the hospital on the day of your surgery. Please confirm the visitor policy for the day of your procedure when you receive your phone call with an arrival time.     Call the surgeon's office with any new illnesses, exposures, or additional questions prior to surgery.    Please reference your “My Surgical Experience Booklet” for additional information to prepare for your upcoming surgery.

## 2025-04-23 ENCOUNTER — OFFICE VISIT (OUTPATIENT)
Age: 70
End: 2025-04-23
Attending: ORTHOPAEDIC SURGERY
Payer: COMMERCIAL

## 2025-04-23 VITALS
WEIGHT: 152 LBS | DIASTOLIC BLOOD PRESSURE: 84 MMHG | BODY MASS INDEX: 26.93 KG/M2 | SYSTOLIC BLOOD PRESSURE: 142 MMHG | HEIGHT: 63 IN

## 2025-04-23 DIAGNOSIS — M16.12 PRIMARY OSTEOARTHRITIS OF LEFT HIP: ICD-10-CM

## 2025-04-23 DIAGNOSIS — R73.03 PREDIABETES: ICD-10-CM

## 2025-04-23 DIAGNOSIS — G89.29 CHRONIC LEFT HIP PAIN: ICD-10-CM

## 2025-04-23 DIAGNOSIS — M25.552 CHRONIC LEFT HIP PAIN: ICD-10-CM

## 2025-04-23 DIAGNOSIS — Z01.818 PREOPERATIVE CLEARANCE: Primary | ICD-10-CM

## 2025-04-23 PROCEDURE — 99213 OFFICE O/P EST LOW 20 MIN: CPT | Performed by: NURSE PRACTITIONER

## 2025-04-23 NOTE — ASSESSMENT & PLAN NOTE
Hgb A1c   Lab Results   Component Value Date    HGBA1C 5.8 (H) 04/17/2025     Recommend following DM diet

## 2025-04-25 ENCOUNTER — PATIENT OUTREACH (OUTPATIENT)
Dept: OBGYN CLINIC | Facility: HOSPITAL | Age: 70
End: 2025-04-25

## 2025-04-25 LAB
DME PARACHUTE DELIVERY DATE ACTUAL: NORMAL
DME PARACHUTE DELIVERY DATE REQUESTED: NORMAL
DME PARACHUTE ITEM DESCRIPTION: NORMAL
DME PARACHUTE ORDER STATUS: NORMAL
DME PARACHUTE SUPPLIER NAME: NORMAL
DME PARACHUTE SUPPLIER PHONE: NORMAL

## 2025-04-25 NOTE — PROGRESS NOTES
JABARICM received a new referral in regard to pt scheduled for arthroplasty of left hip on 05/12/2025. SWCM reviewed NN notes prior to contacting pt. Pt lives with her spouse in a multi level home. Pt ambulates independently (with cane as needed) and is independent with ADLs. Pt caregiver support person will be her spouse, Robbie. Pt spouse will take pt to and from surgery, post op appts and OP PT. Pt did express her hope is to go to [facility] after surgery due to home layout.   SWCM contacted pt today and introduced self and role. SWCM and pt first reviewed caregiver support. If pt is DC to home her spouse will provide caregiver support. Pt spouse will also provide all transportation to and from surgery, post op appts, and OP PT. Pt did express concerns about OP PT because she has a gravel driveway. Pt would like in home PT to start and then transition to OP PT. SWCM will message NN in regard to same.   Pt did express concerns about home layout and the number of stairs. SWCM and pt spoke at length about a first floor set up. Pt concerned she may not be able to sleep on the sofa. SWCM and pt discussed hospital bed rental and rental of recliner chair to have a first floor set up as pt is concerned about the stairs. Pt plans to contact rental places and JOEL will follow up next Tuesday. SWCM will remain available and continue to support pt.

## 2025-04-26 ENCOUNTER — ANESTHESIA EVENT (OUTPATIENT)
Age: 70
End: 2025-04-26
Payer: COMMERCIAL

## 2025-04-27 DIAGNOSIS — M25.552 CHRONIC LEFT HIP PAIN: ICD-10-CM

## 2025-04-27 DIAGNOSIS — G89.29 CHRONIC LEFT HIP PAIN: ICD-10-CM

## 2025-04-27 DIAGNOSIS — M16.12 PRIMARY OSTEOARTHRITIS OF LEFT HIP: ICD-10-CM

## 2025-04-28 ENCOUNTER — EVALUATION (OUTPATIENT)
Dept: PHYSICAL THERAPY | Facility: CLINIC | Age: 70
End: 2025-04-28
Payer: COMMERCIAL

## 2025-04-28 DIAGNOSIS — M16.12 PRIMARY OSTEOARTHRITIS OF LEFT HIP: Primary | ICD-10-CM

## 2025-04-28 DIAGNOSIS — Z47.1 AFTERCARE FOLLOWING LEFT HIP JOINT REPLACEMENT SURGERY: ICD-10-CM

## 2025-04-28 DIAGNOSIS — Z96.642 AFTERCARE FOLLOWING LEFT HIP JOINT REPLACEMENT SURGERY: ICD-10-CM

## 2025-04-28 DIAGNOSIS — M25.552 CHRONIC LEFT HIP PAIN: ICD-10-CM

## 2025-04-28 DIAGNOSIS — G89.29 CHRONIC LEFT HIP PAIN: ICD-10-CM

## 2025-04-28 PROCEDURE — 97110 THERAPEUTIC EXERCISES: CPT

## 2025-04-28 PROCEDURE — 97161 PT EVAL LOW COMPLEX 20 MIN: CPT

## 2025-04-28 PROCEDURE — 97140 MANUAL THERAPY 1/> REGIONS: CPT

## 2025-04-28 NOTE — PROGRESS NOTES
PT Evaluation     Today's date: 2025  Patient name: Sheryl Nichols  : 1955  MRN: [id number]  Referring provider: Chester Barron,*  Dx:   Encounter Diagnosis     ICD-10-CM    1. Primary osteoarthritis of left hip  M16.12 Ambulatory referral to Physical Therapy      2. Chronic left hip pain  M25.552 Ambulatory referral to Physical Therapy    G89.29       3. Aftercare following left hip joint replacement surgery  Z47.1 Ambulatory referral to Physical Therapy    Z96.642           Start Time: 1100  Stop Time: 1145  Total time in clinic (min): 45 minutes    Assessment  Impairments: abnormal gait, abnormal or restricted ROM, activity intolerance, impaired physical strength, lacks appropriate home exercise program and pain with function  Functional limitations: difficulty standing, walking, negotiating stairs  Symptom irritability: moderate    Assessment details: Pt is a 69 y.o. female presenting to physical therapy with chief complaint of L hip ain and stiffness for pre-operative evaluation ahead of planned L RENAY. Upon examination, pt presents with decreased ROM and decreased strength of L hip. Pt's impairments are resulting in limitations with standing, walking, negotiating stairs, transfers. PT POC will focus on restoring independence all ADLs following surgical intervention. Pt is a good candidate for skilled PT to address impairments and facilitate return to prior level of function.   Understanding of Dx/Px/POC: good     Prognosis: good    Goals  STG 4 weeks:  1.   Pt will demonstrate L hip flexion >90 degrees AROM to facilitate improved function in stepping in/out of tub  2.   Pt will demonstrate >1 grade improvement in all hip MMT to improve fucntion in transfers and stair negotiation  3.   Pt will be able to perform stair negotiation w/ 1 HR w/o % of attempts    LTG 8 weeks:  1.   Pt will demonstrate return to PLOF in independent ambulation w/o AD  2.   Pt will be able to perform  sit to stand transfer w/o UE support 100% of attempts  3.   Pt will be able to negotiate >2 flights of stairs w/o HR to improve ability to access community resources and impropve independence within the home    Plan  Planned modality interventions: cryotherapy and low level laser therapy    Planned therapy interventions: IASTM, manual therapy, balance, balance/weight bearing training, neuromuscular re-education, patient/caregiver education, strengthening, stretching, therapeutic activities, therapeutic exercise, home exercise program, gait training, functional ROM exercises and flexibility    Frequency: 2x week  Duration in weeks: 8  Plan of Care beginning date: 2025  Plan of Care expiration date: 2025  Treatment plan discussed with: patient  Plan details: Pt will undergo planned RENAY on , will re-evaluate and modify POC following the intervention        Subjective Evaluation    History of Present Illness  Mechanism of injury: surgery  Mechanism of injury: Pt reports onset of chronic hip pain ~1.5 years prior to IE. Is planning surgical intervention for 25 to address pain and functional limitations  Quality of life: fair    Patient Goals  Patient goals for therapy: decreased pain, improved balance, increased motion, return to sport/leisure activities, independence with ADLs/IADLs and increased strength    Pain  Current pain rating: 3  At best pain rating: 3  At worst pain ratin  Location: L hip  Quality: dull ache and sharp  Relieving factors: change in position  Progression: worsening    Social Support  7  13  Lives with: spouse    Treatments  Previous treatment: physical therapy        Objective     Strength/Myotome Testing     Left Hip   Planes of Motion   Flexion: 3+  Extension: 4-  Abduction: 3    Right Hip   Planes of Motion   Flexion: 4+  Extension: 4+  Abduction: 4+    Left Knee   Flexion: 4-  Extension: 4-    Right Knee   Flexion: 4  Extension: 4    Left Ankle/Foot   Dorsiflexion:  "4+  Plantar flexion: 4    Right Ankle/Foot   Dorsiflexion: 4+  Plantar flexion: 4             Precautions: n/a    POC expires Unit limit Auth Expiration date PT/OT + Visit Limit?   6/23 N/a 12/31 BOMN                 Visit/Unit Tracking  AUTH Status:  Date 4/28              BOMN Used 1               Remaining                       Manuals 4/28                                                                Neuro Re-Ed             HEP, condition, acitivty modification discussed 10'                                                                                          Ther Ex             bridges 20x            Active heel slide 20x            Piri s 3x30\"            bkfo 30x rtb                                                                Ther Activity                                       Gait Training                                       Modalities                                            "

## 2025-04-29 ENCOUNTER — PATIENT OUTREACH (OUTPATIENT)
Dept: OBGYN CLINIC | Facility: HOSPITAL | Age: 70
End: 2025-04-29

## 2025-04-29 RX ORDER — ASCORBIC ACID 500 MG
500 TABLET ORAL 2 TIMES DAILY
Qty: 180 TABLET | Refills: 1 | Status: SHIPPED | OUTPATIENT
Start: 2025-04-29

## 2025-04-29 RX ORDER — FOLIC ACID 1 MG/1
1000 TABLET ORAL DAILY
Qty: 90 TABLET | Refills: 1 | Status: SHIPPED | OUTPATIENT
Start: 2025-04-29

## 2025-04-29 NOTE — PROGRESS NOTES
JOEL contacted pt today to follow up in regard to first floor set up, hospital bed vs recliner. Pt went to rent a center and received a price quote for a month to rent a recliner chair. Pt today shared that her plan will be to have a first floor set up for at least the first few days. JOEL shared with pt that I did speak with NN in regard to pt requesting in home PT to start because she has a gravel driveway. Pt appreciative of same. At this time, pt has all caregiver support and transportation planned. Pt also has plans for a first set up since she had concerns about home layout. JABARICM will remain available.

## 2025-05-08 ENCOUNTER — PATIENT OUTREACH (OUTPATIENT)
Dept: OBGYN CLINIC | Facility: HOSPITAL | Age: 70
End: 2025-05-08

## 2025-05-08 NOTE — PROGRESS NOTES
SWCM contacted pt today to let pt know the NN has made the TEAM aware of pt request to have in home PT due to gravel driveway. Pt also shared that the electric recliner chair is going to be delivered tomorrow 05/09/2025. Pt has no other concerns in regard to DC, SWCM will remain available and provide support as needed.    See scanned ICD report in Chart Review. Chargeable visit.

## 2025-05-09 NOTE — ANESTHESIA PREPROCEDURE EVALUATION
Procedure:  Left total hip arthroplasty, all associated procedures as indicated (Left: Hip)    Relevant Problems   CARDIO   (+) Other hyperlipidemia      MUSCULOSKELETAL   (+) Primary osteoarthritis of left hip      Lab Results   Component Value Date    WBC 7.42 04/17/2025    HGB 12.6 04/17/2025    HCT 38.8 04/17/2025    MCV 92 04/17/2025     04/17/2025     Past Medical History:   Diagnosis Date    Arthritis     Vocal cord nodules     removed       Physical Exam    Airway    Mallampati score: II  TM Distance: >3 FB  Neck ROM: full     Dental   No notable dental hx     Cardiovascular  Rhythm: regular, Rate: normal    Pulmonary   Breath sounds clear to auscultation    Other Findings  Intercisor Distance > 3cm    post-pubertal.      Anesthesia Plan  ASA Score- 2     Anesthesia Type- spinal with ASA Monitors.         Additional Monitors:     Airway Plan:     Comment: Discussed benefits/risks of neuraxial anesthesia including possibility of discomfort at site of injection, post-dural puncture headache, block failure, and more rare complications such as bleeding, infection, and permanent nerve damage. If block fails or there is difficulty placing neuraxial block, general anesthesia was discussed as back-up plan. Patient understands and wishes to proceed. All questions answered.   .       Plan Factors-Exercise tolerance (METS): >4 METS.    Chart reviewed. EKG reviewed.  Existing labs reviewed. Patient summary reviewed.                  Induction- intravenous.    Postoperative Plan- Plan for postoperative opioid use.         Informed Consent- Anesthetic plan and risks discussed with patient.  I personally reviewed this patient with the CRNA. Discussed and agreed on the Anesthesia Plan with the CRNA..      NPO Status:  No vitals data found for the desired time range.

## 2025-05-09 NOTE — DISCHARGE INSTR - AVS FIRST PAGE
Discharge Instructions: Hip replacement with Dr. Barron    Weight Bearing Status:                                           Weight Bearing as tolerated to left lower extremity with assistive devices.     Be sure to maintain posterior hip precautions: no bending at waist, no crossing legs, on internally rotating surgical leg    Pain Management/Medications  You may resume your usual medications.  You may stop pre-operative vitamins (Folic acid, Ferrous sulfate, and Vitamin C) and Bactroban ointment.    Please take the following medications:  Anti-coagulation (blood clot prevention) - Complete Lovenox injections for 28 days.   Pain medication:  Oxycodone 5 m tablet every 6 hours as needed for severe pain  Robaxin (Muscle relaxer) 500 m tablet up to 3 times a day as needed for mild pain and muscle discomfort  Tylenol 1000 mg: up to three times daily as needed for mild to moderate pain. Do not exceed 3000mg daily   The pain medications will likely cause constipation, in order to decrease this risk consider taking over the counter stool softeners or MiraLax    Continue applying ice to your hip on and off for about 20 minutes as needed.  Continue to elevate your operative leg, with ankle above the level of your heart when possible.  If you have questions or pain concerns, please contact the office.   If you need refills of your medications prior to your next office visit, please contact the office.      Showering/Dressing Instructions:   Do not shower until first follow up appointment.  Leave bandage covering surgical incision on until seen in office.   No baths, swimming, or submerging your leg until cleared to do so.      Driving Instructions:  No driving until cleared by Orthopaedic Surgery.    PT/OT:  Continue PT/OT on outpatient basis as directed    Appt Instructions:   Follow up as scheduled on 2025 in [place].  If you need to change or cancel your appointment for any reason, please call the clinic  at [phone]    Please contact the office if you experience any of the following:  Excessive bleeding (bleeding through your dressing)  Fever greater than 101 degrees F after 48 hours (low grade fevers the day or two after surgery are normal)  Persistent nausea or vomiting  Decreased sensation or discoloration of the operative limb  Pain or swelling that is getting worse and not better with medication    Miscellaneous:  Advise use of abduction pillow (pink pillow) for 6 weeks after surgery when sleeping.    Advise against any dental cleanings or procedures for 3 months after surgery.   - If there is a dental emergency, please contact the office for further instructions.

## 2025-05-12 ENCOUNTER — ANESTHESIA (OUTPATIENT)
Age: 70
End: 2025-05-12
Payer: COMMERCIAL

## 2025-05-12 ENCOUNTER — HOSPITAL ENCOUNTER (OUTPATIENT)
Age: 70
Setting detail: OUTPATIENT SURGERY
Discharge: HOME | End: 2025-05-13
Attending: ORTHOPAEDIC SURGERY | Admitting: ORTHOPAEDIC SURGERY
Payer: COMMERCIAL

## 2025-05-12 DIAGNOSIS — M25.552 CHRONIC LEFT HIP PAIN: ICD-10-CM

## 2025-05-12 DIAGNOSIS — G89.29 CHRONIC LEFT HIP PAIN: ICD-10-CM

## 2025-05-12 DIAGNOSIS — M16.12 PRIMARY OSTEOARTHRITIS OF LEFT HIP: ICD-10-CM

## 2025-05-12 DIAGNOSIS — Z47.1 AFTERCARE FOLLOWING LEFT HIP JOINT REPLACEMENT SURGERY: Primary | ICD-10-CM

## 2025-05-12 DIAGNOSIS — Z96.642 AFTERCARE FOLLOWING LEFT HIP JOINT REPLACEMENT SURGERY: Primary | ICD-10-CM

## 2025-05-12 PROCEDURE — 27130 TOTAL HIP ARTHROPLASTY: CPT

## 2025-05-12 PROCEDURE — 27130 TOTAL HIP ARTHROPLASTY: CPT | Performed by: ORTHOPAEDIC SURGERY

## 2025-05-12 PROCEDURE — C1776 JOINT DEVICE (IMPLANTABLE): HCPCS | Performed by: ORTHOPAEDIC SURGERY

## 2025-05-12 PROCEDURE — NC001 PR NO CHARGE: Performed by: ORTHOPAEDIC SURGERY

## 2025-05-12 PROCEDURE — A6250 SKIN SEAL PROTECT MOISTURIZR: HCPCS | Performed by: ORTHOPAEDIC SURGERY

## 2025-05-12 PROCEDURE — C1713 ANCHOR/SCREW BN/BN,TIS/BN: HCPCS | Performed by: ORTHOPAEDIC SURGERY

## 2025-05-12 PROCEDURE — 99222 1ST HOSP IP/OBS MODERATE 55: CPT | Performed by: INTERNAL MEDICINE

## 2025-05-12 DEVICE — PINNACLE POROCOAT ACETABULAR SHELL SECTOR II 52MM OD
Type: IMPLANTABLE DEVICE | Site: HIP | Status: FUNCTIONAL
Brand: PINNACLE POROCOAT

## 2025-05-12 DEVICE — CORAIL HIP SYSTEM CEMENTLESS FEMORAL STEM 12/14 AMT 135 DEGREES KHO SIZE 13 HA COATED HIGH OFFSET NO COLLAR
Type: IMPLANTABLE DEVICE | Site: HIP | Status: FUNCTIONAL
Brand: CORAIL

## 2025-05-12 DEVICE — PINNACLE CANCELLOUS BONE SCREW 6.5MM X 25MM
Type: IMPLANTABLE DEVICE | Site: HIP | Status: FUNCTIONAL
Brand: PINNACLE

## 2025-05-12 DEVICE — PINNACLE HIP SOLUTIONS ALTRX POLYETHYLENE ACETABULAR LINER +4 10 DEGREE 36MM ID 52MM OD
Type: IMPLANTABLE DEVICE | Site: HIP | Status: FUNCTIONAL
Brand: PINNACLE ALTRX

## 2025-05-12 DEVICE — M-SPEC METAL FEMORAL HEAD 12/14 TAPER DIAMETER 36MM +1.5: Type: IMPLANTABLE DEVICE | Site: HIP | Status: FUNCTIONAL

## 2025-05-12 RX ORDER — SODIUM CHLORIDE, SODIUM LACTATE, POTASSIUM CHLORIDE, CALCIUM CHLORIDE 600; 310; 30; 20 MG/100ML; MG/100ML; MG/100ML; MG/100ML
125 INJECTION, SOLUTION INTRAVENOUS CONTINUOUS
Status: DISCONTINUED | OUTPATIENT
Start: 2025-05-12 | End: 2025-05-13 | Stop reason: HOSPADM

## 2025-05-12 RX ORDER — FENTANYL CITRATE/PF 50 MCG/ML
25 SYRINGE (ML) INJECTION
Refills: 0 | Status: CANCELLED | OUTPATIENT
Start: 2025-05-12

## 2025-05-12 RX ORDER — MIDAZOLAM HYDROCHLORIDE 2 MG/2ML
INJECTION, SOLUTION INTRAMUSCULAR; INTRAVENOUS AS NEEDED
Status: DISCONTINUED | OUTPATIENT
Start: 2025-05-12 | End: 2025-05-12

## 2025-05-12 RX ORDER — PROMETHAZINE HYDROCHLORIDE 25 MG/ML
12.5 INJECTION, SOLUTION INTRAMUSCULAR; INTRAVENOUS ONCE AS NEEDED
Status: CANCELLED | OUTPATIENT
Start: 2025-05-12

## 2025-05-12 RX ORDER — CEFAZOLIN SODIUM 2 G/50ML
2000 SOLUTION INTRAVENOUS ONCE
Status: DISCONTINUED | OUTPATIENT
Start: 2025-05-12 | End: 2025-05-12 | Stop reason: HOSPADM

## 2025-05-12 RX ORDER — CEFAZOLIN SODIUM 2 G/50ML
SOLUTION INTRAVENOUS AS NEEDED
Status: DISCONTINUED | OUTPATIENT
Start: 2025-05-12 | End: 2025-05-12

## 2025-05-12 RX ORDER — MAGNESIUM HYDROXIDE 1200 MG/15ML
LIQUID ORAL AS NEEDED
Status: DISCONTINUED | OUTPATIENT
Start: 2025-05-12 | End: 2025-05-12 | Stop reason: HOSPADM

## 2025-05-12 RX ORDER — SODIUM CHLORIDE, SODIUM LACTATE, POTASSIUM CHLORIDE, CALCIUM CHLORIDE 600; 310; 30; 20 MG/100ML; MG/100ML; MG/100ML; MG/100ML
INJECTION, SOLUTION INTRAVENOUS CONTINUOUS PRN
Status: DISCONTINUED | OUTPATIENT
Start: 2025-05-12 | End: 2025-05-12

## 2025-05-12 RX ORDER — OXYCODONE HYDROCHLORIDE 5 MG/1
5 TABLET ORAL EVERY 6 HOURS PRN
Qty: 30 TABLET | Refills: 0 | Status: SHIPPED | OUTPATIENT
Start: 2025-05-12 | End: 2025-05-22

## 2025-05-12 RX ORDER — ENOXAPARIN SODIUM 100 MG/ML
40 INJECTION SUBCUTANEOUS DAILY
Status: DISCONTINUED | OUTPATIENT
Start: 2025-05-13 | End: 2025-05-13 | Stop reason: HOSPADM

## 2025-05-12 RX ORDER — OXYCODONE HYDROCHLORIDE 5 MG/1
5 TABLET ORAL EVERY 4 HOURS PRN
Status: DISCONTINUED | OUTPATIENT
Start: 2025-05-12 | End: 2025-05-13 | Stop reason: HOSPADM

## 2025-05-12 RX ORDER — CEFAZOLIN SODIUM 1 G/50ML
1000 SOLUTION INTRAVENOUS EVERY 8 HOURS
Status: COMPLETED | OUTPATIENT
Start: 2025-05-12 | End: 2025-05-13

## 2025-05-12 RX ORDER — ACETAMINOPHEN 325 MG/1
975 TABLET ORAL EVERY 6 HOURS PRN
Status: DISCONTINUED | OUTPATIENT
Start: 2025-05-12 | End: 2025-05-13 | Stop reason: HOSPADM

## 2025-05-12 RX ORDER — HYDROMORPHONE HCL/PF 1 MG/ML
0.5 SYRINGE (ML) INJECTION EVERY 2 HOUR PRN
Status: DISCONTINUED | OUTPATIENT
Start: 2025-05-12 | End: 2025-05-13 | Stop reason: HOSPADM

## 2025-05-12 RX ORDER — CELECOXIB 200 MG/1
200 CAPSULE ORAL ONCE
Status: COMPLETED | OUTPATIENT
Start: 2025-05-12 | End: 2025-05-12

## 2025-05-12 RX ORDER — CALCIUM CARBONATE 500 MG/1
1000 TABLET, CHEWABLE ORAL DAILY PRN
Status: DISCONTINUED | OUTPATIENT
Start: 2025-05-12 | End: 2025-05-13 | Stop reason: HOSPADM

## 2025-05-12 RX ORDER — DOCUSATE SODIUM 100 MG/1
100 CAPSULE, LIQUID FILLED ORAL 2 TIMES DAILY
Status: DISCONTINUED | OUTPATIENT
Start: 2025-05-12 | End: 2025-05-13 | Stop reason: HOSPADM

## 2025-05-12 RX ORDER — HYDROMORPHONE HCL/PF 1 MG/ML
0.5 SYRINGE (ML) INJECTION
Refills: 0 | Status: CANCELLED | OUTPATIENT
Start: 2025-05-12

## 2025-05-12 RX ORDER — ONDANSETRON 2 MG/ML
4 INJECTION INTRAMUSCULAR; INTRAVENOUS ONCE AS NEEDED
Status: CANCELLED | OUTPATIENT
Start: 2025-05-12

## 2025-05-12 RX ORDER — OXYCODONE HYDROCHLORIDE 10 MG/1
10 TABLET ORAL EVERY 4 HOURS PRN
Status: DISCONTINUED | OUTPATIENT
Start: 2025-05-12 | End: 2025-05-13 | Stop reason: HOSPADM

## 2025-05-12 RX ORDER — TRANEXAMIC ACID 10 MG/ML
1000 INJECTION, SOLUTION INTRAVENOUS ONCE
Status: COMPLETED | OUTPATIENT
Start: 2025-05-12 | End: 2025-05-12

## 2025-05-12 RX ORDER — ONDANSETRON 2 MG/ML
4 INJECTION INTRAMUSCULAR; INTRAVENOUS EVERY 6 HOURS PRN
Status: DISCONTINUED | OUTPATIENT
Start: 2025-05-12 | End: 2025-05-13 | Stop reason: HOSPADM

## 2025-05-12 RX ORDER — LABETALOL HYDROCHLORIDE 5 MG/ML
5 INJECTION, SOLUTION INTRAVENOUS
Status: CANCELLED | OUTPATIENT
Start: 2025-05-12

## 2025-05-12 RX ORDER — METHOCARBAMOL 500 MG/1
500 TABLET, FILM COATED ORAL 3 TIMES DAILY PRN
Qty: 60 TABLET | Refills: 0 | Status: SHIPPED | OUTPATIENT
Start: 2025-05-12

## 2025-05-12 RX ORDER — CHLORHEXIDINE GLUCONATE ORAL RINSE 1.2 MG/ML
15 SOLUTION DENTAL ONCE
Status: COMPLETED | OUTPATIENT
Start: 2025-05-12 | End: 2025-05-12

## 2025-05-12 RX ORDER — GABAPENTIN 100 MG/1
100 CAPSULE ORAL EVERY 8 HOURS
Status: DISCONTINUED | OUTPATIENT
Start: 2025-05-12 | End: 2025-05-13 | Stop reason: HOSPADM

## 2025-05-12 RX ORDER — ACETAMINOPHEN 500 MG
1000 TABLET ORAL EVERY 6 HOURS PRN
Qty: 120 TABLET | Refills: 0 | Status: SHIPPED | OUTPATIENT
Start: 2025-05-12

## 2025-05-12 RX ORDER — PROPOFOL 10 MG/ML
INJECTION, EMULSION INTRAVENOUS CONTINUOUS PRN
Status: DISCONTINUED | OUTPATIENT
Start: 2025-05-12 | End: 2025-05-12

## 2025-05-12 RX ORDER — ACETAMINOPHEN 325 MG/1
975 TABLET ORAL ONCE
Status: COMPLETED | OUTPATIENT
Start: 2025-05-12 | End: 2025-05-12

## 2025-05-12 RX ORDER — ALBUTEROL SULFATE 0.83 MG/ML
2.5 SOLUTION RESPIRATORY (INHALATION) ONCE AS NEEDED
Status: CANCELLED | OUTPATIENT
Start: 2025-05-12

## 2025-05-12 RX ORDER — METHOCARBAMOL 500 MG/1
500 TABLET, FILM COATED ORAL EVERY 6 HOURS SCHEDULED
Status: DISCONTINUED | OUTPATIENT
Start: 2025-05-12 | End: 2025-05-13 | Stop reason: HOSPADM

## 2025-05-12 RX ADMIN — SODIUM CHLORIDE, SODIUM LACTATE, POTASSIUM CHLORIDE, AND CALCIUM CHLORIDE 125 ML/HR: .6; .31; .03; .02 INJECTION, SOLUTION INTRAVENOUS at 11:59

## 2025-05-12 RX ADMIN — TRANEXAMIC ACID 1000 MG: 10 INJECTION, SOLUTION INTRAVENOUS at 13:02

## 2025-05-12 RX ADMIN — SODIUM CHLORIDE, SODIUM LACTATE, POTASSIUM CHLORIDE, AND CALCIUM CHLORIDE 125 ML/HR: .6; .31; .03; .02 INJECTION, SOLUTION INTRAVENOUS at 23:45

## 2025-05-12 RX ADMIN — SODIUM CHLORIDE, SODIUM LACTATE, POTASSIUM CHLORIDE, AND CALCIUM CHLORIDE: .6; .31; .03; .02 INJECTION, SOLUTION INTRAVENOUS at 14:26

## 2025-05-12 RX ADMIN — CEFAZOLIN SODIUM 2000 MG: 2 SOLUTION INTRAVENOUS at 12:58

## 2025-05-12 RX ADMIN — DOCUSATE SODIUM 100 MG: 100 CAPSULE, LIQUID FILLED ORAL at 17:53

## 2025-05-12 RX ADMIN — METHOCARBAMOL 500 MG: 500 TABLET ORAL at 17:53

## 2025-05-12 RX ADMIN — OXYCODONE HYDROCHLORIDE 5 MG: 5 TABLET ORAL at 20:04

## 2025-05-12 RX ADMIN — ACETAMINOPHEN 975 MG: 325 TABLET ORAL at 11:51

## 2025-05-12 RX ADMIN — GABAPENTIN 100 MG: 100 CAPSULE ORAL at 16:25

## 2025-05-12 RX ADMIN — MEPIVACAINE HYDROCHLORIDE 3 ML: 15 INJECTION, SOLUTION EPIDURAL; INFILTRATION at 13:01

## 2025-05-12 RX ADMIN — CELECOXIB 200 MG: 200 CAPSULE ORAL at 11:51

## 2025-05-12 RX ADMIN — CEFAZOLIN SODIUM 1000 MG: 1 SOLUTION INTRAVENOUS at 21:27

## 2025-05-12 RX ADMIN — PROPOFOL 80 MCG/KG/MIN: 10 INJECTION, EMULSION INTRAVENOUS at 13:02

## 2025-05-12 RX ADMIN — OXYCODONE HYDROCHLORIDE 5 MG: 5 TABLET ORAL at 16:04

## 2025-05-12 RX ADMIN — METHOCARBAMOL 500 MG: 500 TABLET ORAL at 23:43

## 2025-05-12 RX ADMIN — GABAPENTIN 100 MG: 100 CAPSULE ORAL at 23:43

## 2025-05-12 RX ADMIN — SODIUM CHLORIDE, SODIUM LACTATE, POTASSIUM CHLORIDE, AND CALCIUM CHLORIDE: .6; .31; .03; .02 INJECTION, SOLUTION INTRAVENOUS at 12:54

## 2025-05-12 RX ADMIN — CHLORHEXIDINE GLUCONATE 15 ML: 1.2 SOLUTION ORAL at 11:51

## 2025-05-12 RX ADMIN — SODIUM CHLORIDE, SODIUM LACTATE, POTASSIUM CHLORIDE, AND CALCIUM CHLORIDE 125 ML/HR: .6; .31; .03; .02 INJECTION, SOLUTION INTRAVENOUS at 15:56

## 2025-05-12 RX ADMIN — MIDAZOLAM 2 MG: 1 INJECTION INTRAMUSCULAR; INTRAVENOUS at 12:52

## 2025-05-12 NOTE — ANESTHESIA PROCEDURE NOTES
Spinal Block    Patient location during procedure: OR  Start time: 5/12/2025 1:01 PM  Reason for block: primary anesthetic  Staffing  Performed by: Jayden Hemphill CRNA  Authorized by: Cesar Santos MD    Preanesthetic Checklist  Completed: patient identified, IV checked, site marked, risks and benefits discussed, surgical consent, monitors and equipment checked, pre-op evaluation and timeout performed  Spinal Block  Patient position: sitting  Prep: Betadine  Patient monitoring: heart rate, cardiac monitor, continuous pulse ox and frequent blood pressure checks  Approach: midline  Location: L3-4  Needle  Needle type: Shakila   Needle gauge: 25 G  Needle length: 5 in  Assessment  Sensory level: T4  Injection Assessment:  negative aspiration for heme, no paresthesia on injection and positive aspiration for clear CSF.  Post-procedure:  site cleaned

## 2025-05-12 NOTE — OP NOTE
OPERATIVE REPORT  PATIENT NAME: Sheryl Nichols  : 1955  MRN: [id number]  Pt Location:  [facility] OR ROOM 05    Surgery Date: 2025    Surgeons and Role:     * Chester Barron MD - Primary     * Tara Lorenzo PA-C - Assisting     Preop Diagnosis:  Primary osteoarthritis of left hip [M16.12]  Chronic left hip pain [M25.552, G89.29]    Post-Op Diagnosis Codes:     * Primary osteoarthritis of left hip [M16.12]     * Chronic left hip pain [M25.552, G89.29]    Procedure(s):  Left - Left total hip arthroplasty    Specimens:  * No specimens in log *    Estimated Blood Loss:   Minimal    Drains:  [REMOVED] Urethral Catheter Double-lumen 16 Fr. (Removed)   Number of days: 0       Anesthesia Type:   Choice     Operative Indications:  Primary osteoarthritis of left hip [M16.12]  Chronic left hip pain [M25.552, G89.29]    Operative Findings:  Depuy   Cup-52mm metal   Liiner-10 degree lipped poly   Head/neck-32 +1.5mm metal   Femur-13 HO    Complications:   None      Hip Approach: Posterior    Chronic Narcotic Use:  No      Procedure and Technique:  Following the induction of adequate level of spinal anesthesia, Chung catheter was sterilely introduced in this patient's bladder.  Antibiotics administered.  She was then placed in the right lateral cubitus, left side up position.  An axillary roll was placed underneath the right axilla.  Left hip and lateral thigh were then prepped draped sterilely.  A posterolateral approach was created gaining access to the hip.  Full-thickness flaps were raised to get to the tensor fascia.  This was split expose the deep layer of the hip.  With the hip in internal rotation, the piriformis tendon was identified, transected, and retracted in a posterior fashion.  The remainder the short external rotators were sectioned as well.  A T-type capsulotomy was used to open up the hip.  The femoral head was then delivered posteriorly.  Utilizing femoral neck osteotomy guide, the proper  femoral cut was then made.  A posterior capsulectomy, and anterior capsulotomy were then created in order to circumferentially expose the acetabulum.  Reaming started at 50 and extended 52 which point time a Hemisphere bleeding cancellous bone was encountered.  52 trial was inserted and noted to fit well, therefore the 52 mm insert was then hammered into place.  2 screws were placed in the posterior superior quadrant for fixation.  10 degree lipped liner was snapped into position.  The proximal femur was then prepared for insertion of press-fit component.  Box osteotome was used the proximal femur.  Patient's canal sequentially broached.  With a 13 high offset and a 32+1.5 femoral head and neck, it was located, taken through range of motion, found a quite stable.  The trial components removed if that hip was carefully dislocated.  The insert components were assembled and introduced the patient's left hip in standard fashion.  The hip was once more located, taken through range of motion graft adequate stable.  Satisfied with the extent of surgery, the wounds were flushed with saline and closed.  A Betadine soak initiated.  The piriformis tendon was reapproximated to the greater trochanter number Vicryl suture.  The tensor fascia was then closed number Vicryl suture.  The subcu tissues were closed with a mixture #1 for deep layer, 2-0 Vicryl for the subcutaneous tissues, and skin staples in the skin.  Sterile dressings were applied.  She was then transferred to recovery in stable condition with plans to include physical therapy for weightbearing to tolerance.  She will require DVT prophylaxis with Lovenox.  Please note, there is no qualified orthopedic resident was available to assist, the assistance of Tara Lorenzo PA-C was instrumental in the safe execution of this patient surgery.  Start the patient position, patient prepped draped, intraoperative assistance, wound closure, dressing application, patient transfers,  all of these were performed under my direct supervision   I was present for the entire procedure.    Patient Disposition:  PACU               SIGNATURE: Chester Barron MD  DATE: May 12, 2025  TIME: 2:30 PM

## 2025-05-12 NOTE — PROGRESS NOTES
Progress Note - Internal Medicine   Name: Sheryl Nichols 69 y.o. female I MRN: [id number]  Unit/Bed#: [facility] 2 N -01 I Date of Admission: 5/12/2025   Date of Service: 5/12/2025 I Hospital Day: 0     Assessment & Plan  Aftercare following left hip joint replacement surgery  69 year old female POD 1 s/p left RENAY   Hgb: 10.2 from 12.6. Asymptomatic.    Plan:  - WBAT on LLE  - Pain and nause control PRN  - Posterior hip precautions   - Abduction pillow   - PT/OT  - DVT ppx  - Trend labs   - Frequent vitals   - Dispo pending PT    24 Hour Events : No acute overnight events   Subjective : Patient in minimal amount of pain located in the LLE. She denies N/V, fevers or chills    Objective :  Temp:  [97.2 °F (36.2 °C)-99.1 °F (37.3 °C)] 97.6 °F (36.4 °C)  HR:  [37-72] 70  BP: ()/(33-88) 135/79  Resp:  [12-21] 17  SpO2:  [92 %-100 %] 97 %  O2 Device: None (Room air)    Physical Exam  Constitutional:       General: She is not in acute distress.     Appearance: Normal appearance. She is not ill-appearing, toxic-appearing or diaphoretic.   Cardiovascular:      Rate and Rhythm: Normal rate and regular rhythm.      Pulses: Normal pulses.      Heart sounds: Normal heart sounds. No murmur heard.     No gallop.   Pulmonary:      Effort: Pulmonary effort is normal. No respiratory distress.      Breath sounds: Normal breath sounds. No wheezing or rales.   Abdominal:      General: Abdomen is flat. Bowel sounds are normal. There is no distension.      Palpations: Abdomen is soft. There is no mass.      Tenderness: There is no abdominal tenderness. There is no guarding or rebound.      Hernia: No hernia is present.   Skin:     General: Skin is warm and dry.   Neurological:      General: No focal deficit present.      Mental Status: She is alert and oriented to person, place, and time.     LLE: Mepilex bandage is clean, dry and intact. Some surrounding ecchymosis. Area soft without erythema or induration. Neurovascularly  intact. Toes warm and pink.       Lab Results: I have reviewed the following results:  Recent Labs     05/13/25  0528   WBC 6.44   HGB 10.2*      SODIUM 137   K 4.4      CO2 27   BUN 12   CREATININE 0.61   GLUC 121   CALCIUM 8.4          VTE Pharmacologic Prophylaxis: VTE covered by:  [START ON 5/13/2025] enoxaparin, Subcutaneous     VTE Mechanical Prophylaxis: sequential compression device

## 2025-05-12 NOTE — CONSULTS
"Consultation - Internal Medicine   Name: Sheryl Nichols 69 y.o. female I MRN: [id number]  Unit/Bed#: [facility] 2 N -01 I Date of Admission: 2025   Date of Service: 2025 I Hospital Day: 0   Inpatient consult to Internal Medicine  Consult performed by: Hemal Felder PA-C  Consult ordered by: Tara Lorenzo PA-C        Physician Requesting Evaluation: Chester Barron MD   Reason for Evaluation / Principal Problem: Left RENAY    Assessment & Plan  Aftercare following left hip joint replacement surgery  69 year old female POD 0 s/p left RENAY     Plan:  - WBAT on LLE  - Pain and nause control PRN  - Posterior hip precautions   - Abduction pillow   - PT/OT  - DVT ppx  - Trend labs   - Frequent vitals   - Dispo pending PT  Please contact the SecureChat role for the Internal Medicine service with any questions/concerns.    Code Status: Level 1 - Full Code  Admission Status: INPATIENT   Disposition: Patient requires Med Surg      History of Present Illness   69 year old female with a past medical history significant for arthritis is now POD 0 s/p left RENAY. Upon evaluation, patient describes pain as \"soreness\" that has significantly decreased since arriving to floor. She rates her pain a 0 out of 10 at the moment. She denies any nausea, vomiting, fevers or chills at this time. She does mention some numbness that wore off an hour ago likely due to the nerve block. Patient has no other questions or concerns at this time.    Review of Systems   Constitutional: Negative.    Respiratory: Negative.     Cardiovascular: Negative.    Gastrointestinal: Negative.    Genitourinary: Negative.    Musculoskeletal: Negative.    Neurological: Negative.    Psychiatric/Behavioral: Negative.       Historical Information   Past Medical History:   Diagnosis Date    Arthritis     Vocal cord nodules     removed     Past Surgical History:   Procedure Laterality Date     SECTION      COLONOSCOPY      LARYNGOSCOPY      with " stripping of vocal cords     Social History     Tobacco Use    Smoking status: Former     Current packs/day: 0.00     Average packs/day: 0.5 packs/day for 30.0 years (15.0 ttl pk-yrs)     Types: Cigarettes     Start date: 1971     Quit date: 2001     Years since quittin.3    Smokeless tobacco: Never   Vaping Use    Vaping status: Never Used   Substance and Sexual Activity    Alcohol use: Yes     Alcohol/week: 2.0 standard drinks of alcohol     Types: 2 Glasses of wine per week     Comment: weekly    Drug use: No    Sexual activity: Not Currently     Partners: Male     Birth control/protection: Post-menopausal     E-Cigarette/Vaping    E-Cigarette Use Never User      E-Cigarette/Vaping Substances    Nicotine No     THC No     CBD No     Flavoring No     Other No     Unknown No      Family History   Problem Relation Age of Onset    Lung cancer Mother     Breast cancer Mother 45    Diabetes type I Mother     Lung cancer Father     Cancer Father     Diabetes type I Father     Diabetes Daughter         mellitus    Diabetes Son         mellitus    Mental illness Son         Recently had a change in medication    No Known Problems Sister     No Known Problems Maternal Grandmother     No Known Problems Paternal Grandmother     No Known Problems Daughter     Stroke Son         Recently experienced another stroke    Diabetes Son      Social History     Tobacco Use    Smoking status: Former     Current packs/day: 0.00     Average packs/day: 0.5 packs/day for 30.0 years (15.0 ttl pk-yrs)     Types: Cigarettes     Start date: 1971     Quit date: 2001     Years since quittin.3    Smokeless tobacco: Never   Vaping Use    Vaping status: Never Used   Substance and Sexual Activity    Alcohol use: Yes     Alcohol/week: 2.0 standard drinks of alcohol     Types: 2 Glasses of wine per week     Comment: weekly    Drug use: No    Sexual activity: Not Currently     Partners: Male     Birth control/protection:  Post-menopausal       Current Facility-Administered Medications:     acetaminophen (TYLENOL) tablet 975 mg, Q6H PRN    calcium carbonate (TUMS) chewable tablet 1,000 mg, Daily PRN    ceFAZolin (ANCEF) IVPB (premix in dextrose) 1,000 mg 50 mL, Q8H    docusate sodium (COLACE) capsule 100 mg, BID    [START ON 5/13/2025] enoxaparin (LOVENOX) subcutaneous injection 40 mg, Daily    gabapentin (NEURONTIN) capsule 100 mg, Q8H    HYDROmorphone (DILAUDID) injection 0.5 mg, Q2H PRN    lactated ringers bolus 1,000 mL, Once PRN **AND** lactated ringers bolus 1,000 mL, Once PRN    lactated ringers infusion, Continuous    lactated ringers infusion, Continuous, Last Rate: 125 mL/hr (05/12/25 1448)    lactated ringers infusion, Continuous, Last Rate: 125 mL/hr (05/12/25 1556)    methocarbamol (ROBAXIN) tablet 500 mg, Q6H BARON    ondansetron (ZOFRAN) injection 4 mg, Q6H PRN    oxyCODONE (ROXICODONE) immediate release tablet 10 mg, Q4H PRN    oxyCODONE (ROXICODONE) IR tablet 5 mg, Q4H PRN    sodium chloride 0.9 % bolus 1,000 mL, Once PRN **AND** sodium chloride 0.9 % bolus 1,000 mL, Once PRN  Prior to Admission Medications   Prescriptions Last Dose Informant Patient Reported? Taking?   Multiple Vitamin (Daily-Nicanor Multivitamin) TABS 5/11/2025 Self Yes Yes   Sig: Take 1 tablet by mouth in the morning   Vitamin D-Vitamin K (VITAMIN K2-VITAMIN D3 PO) 4/21/2025 Self Yes Yes   Sig: Take by mouth   Patient not taking: Reported on 4/23/2025   ascorbic acid (VITAMIN C) 500 mg tablet 5/11/2025  No Yes   Sig: TAKE 1 TABLET BY MOUTH TWICE A DAY   enoxaparin (LOVENOX) 40 mg/0.4 mL  Self No Yes   Sig: Inject 0.4 mL (40 mg total) under the skin daily for 28 days To start Post-Op   ferrous sulfate 324 (65 Fe) mg 5/11/2025 Self No Yes   Sig: Take 1 tablet (324 mg total) by mouth daily before breakfast   folic acid (FOLVITE) 1 mg tablet 5/11/2025  No Yes   Sig: TAKE 1 TABLET BY MOUTH EVERY DAY   multivitamin-minerals (CENTRUM) tablet Past Month Self  Yes Yes   Sig: Take by mouth    Patient not taking: Reported on 4/23/2025   mupirocin (BACTROBAN) 2 % ointment  Self No No   Sig: Apply topically to the inside of the left and right nostrils twice daily for 5 days before surgery, including the morning of surgery.   Patient not taking: Reported on 4/23/2025      Facility-Administered Medications: None     Morphine    Objective :  Temp:  [97.2 °F (36.2 °C)-99.1 °F (37.3 °C)] 97.5 °F (36.4 °C)  HR:  [37-72] 71  BP: ()/(33-88) 142/77  Resp:  [12-21] 17  SpO2:  [92 %-100 %] 97 %  O2 Device: None (Room air)    Intake & Output:  I/O         05/10 0701  05/11 0700 05/11 0701  05/12 0700 05/12 0701  05/13 0700    I.V. (mL/kg)   1000 (14.3)    Total Intake(mL/kg)   1000 (14.3)    Net   +1000                 Weights:        Body mass index is 27.79 kg/m².  Weight (last 2 days)       Date/Time Weight    05/12/25 1127 70 (154.4)          Physical Exam  Constitutional:       General: She is not in acute distress.     Appearance: Normal appearance. She is not ill-appearing, toxic-appearing or diaphoretic.   Cardiovascular:      Rate and Rhythm: Normal rate and regular rhythm.      Pulses: Normal pulses.      Heart sounds: Normal heart sounds. No murmur heard.  Pulmonary:      Effort: Pulmonary effort is normal. No respiratory distress.      Breath sounds: Normal breath sounds.   Abdominal:      General: Abdomen is flat. Bowel sounds are normal. There is no distension.      Palpations: Abdomen is soft. There is no mass.      Tenderness: There is no abdominal tenderness. There is no guarding or rebound.      Hernia: No hernia is present.   Skin:     General: Skin is warm and dry.      Coloration: Skin is not jaundiced.      Findings: No erythema.   Neurological:      General: No focal deficit present.      Mental Status: She is alert and oriented to person, place, and time.     LLE: Mepilex bandage is clean, dry and intact. Some surrounding ecchymosis. Mild edema  surrounding  "the area. Area soft without erythema or induration. Neurovascularly intact. Toes warm and pink.      Lab Results: I have reviewed the following results:  No results for input(s): \"WBC\", \"HGB\", \"HCT\", \"PLT\", \"BANDSPCT\", \"SODIUM\", \"K\", \"CL\", \"CO2\", \"BUN\", \"CREATININE\", \"GLUC\", \"CAIONIZED\", \"MG\", \"PHOS\", \"AST\", \"ALT\", \"ALB\", \"TBILI\", \"DBILI\", \"ALKPHOS\", \"PTT\", \"INR\", \"HSTNI0\", \"HSTNI2\", \"BNP\", \"LACTICACID\" in the last 72 hours.  Micro:  Lab Results   Component Value Date    URINECX >100,000 cfu/ml Escherichia coli (A) 08/03/2018         Currently Ordered Meds:   Current Facility-Administered Medications:     acetaminophen (TYLENOL) tablet 975 mg, Q6H PRN    calcium carbonate (TUMS) chewable tablet 1,000 mg, Daily PRN    ceFAZolin (ANCEF) IVPB (premix in dextrose) 1,000 mg 50 mL, Q8H    docusate sodium (COLACE) capsule 100 mg, BID    [START ON 5/13/2025] enoxaparin (LOVENOX) subcutaneous injection 40 mg, Daily    gabapentin (NEURONTIN) capsule 100 mg, Q8H    HYDROmorphone (DILAUDID) injection 0.5 mg, Q2H PRN    lactated ringers bolus 1,000 mL, Once PRN **AND** lactated ringers bolus 1,000 mL, Once PRN    lactated ringers infusion, Continuous    lactated ringers infusion, Continuous, Last Rate: 125 mL/hr (05/12/25 1448)    lactated ringers infusion, Continuous, Last Rate: 125 mL/hr (05/12/25 1556)    methocarbamol (ROBAXIN) tablet 500 mg, Q6H BARON    ondansetron (ZOFRAN) injection 4 mg, Q6H PRN    oxyCODONE (ROXICODONE) immediate release tablet 10 mg, Q4H PRN    oxyCODONE (ROXICODONE) IR tablet 5 mg, Q4H PRN    sodium chloride 0.9 % bolus 1,000 mL, Once PRN **AND** sodium chloride 0.9 % bolus 1,000 mL, Once PRN  VTE Pharmacologic Prophylaxis: VTE covered by:  [START ON 5/13/2025] enoxaparin, Subcutaneous     VTE Mechanical Prophylaxis: sequential compression device    Administrative Statements   I have spent a total time of 30 minutes in caring for this patient on the day of the visit/encounter including Counseling / " "Coordination of care, Documenting in the medical record, Reviewing/placing orders in the medical record (including tests, medications, and/or procedures), Obtaining or reviewing history  , and Communicating with other healthcare professionals .  Portions of the record may have been created with voice recognition software.  Occasional wrong word or \"sound a like\" substitutions may have occurred due to the inherent limitations of voice recognition software.  Read the chart carefully and recognize, using context, where substitutions have occurred.  ==  Hemal Felder PA-C  [facility]  "

## 2025-05-12 NOTE — ASSESSMENT & PLAN NOTE
69 year old female POD 0 s/p left RENAY     Plan:  - WBAT on LLE  - Pain and nause control PRN  - Posterior hip precautions   - Abduction pillow   - PT/OT  - DVT ppx  - Trend labs   - Frequent vitals   - Dispo pending PT

## 2025-05-12 NOTE — ANESTHESIA PROCEDURE NOTES
Spinal Block    Patient location during procedure: OR  Start time: 5/12/2025 1:02 PM  Reason for block: procedure for pain and at surgeon's request  Staffing  Performed by: Jayden Hemphill CRNA  Authorized by: Cesar Santos MD    Preanesthetic Checklist  Completed: patient identified, IV checked, site marked, risks and benefits discussed, surgical consent, monitors and equipment checked, pre-op evaluation and timeout performed  Spinal Block  Patient position: sitting  Prep: Betadine and site prepped and draped  Patient monitoring: frequent blood pressure checks, continuous pulse ox and heart rate  Approach: midline  Location: L3-4  Needle  Needle type: Pencan   Needle gauge: 24 G  Needle length: 4 in  Assessment  Sensory level: T4  Injection Assessment:  negative aspiration for heme, no paresthesia on injection and positive aspiration for clear CSF.  Post-procedure:  site cleaned

## 2025-05-12 NOTE — ASSESSMENT & PLAN NOTE
69 year old female POD 1 s/p left RENAY   Hgb: 10.2 from 12.6. Asymptomatic.    Plan:  - WBAT on LLE  - Pain and nause control PRN  - Posterior hip precautions   - Abduction pillow   - PT/OT  - DVT ppx  - Trend labs   - Frequent vitals   - Dispo pending PT

## 2025-05-12 NOTE — H&P
H&P Exam - Orthopedics   Sheryl Nichols 69 y.o. female MRN: [id number]      Assessment/Plan   Assessment:  left Hip Osteoarthritis in this adult female who continues to have both pain and dysfunction despite appropriate nonsurgical treatments    Plan:  left posterior Total Hip Arthroplasty.  Patient appears familiar with risks, benefits, alternatives      TOTAL HIP REPLACEMENT INDICATIONS AND RISKS  We had a lengthy discussion with the patient regarding the potential options for treatment.  Based on current presentation, radiographic exam, and lack of sufficient response to nonsurgical management including activity modification, NSAIDs and therapeutic exercise, I would offer treatment in the form of total hip arthroplasty at this time.      The potential risks and benefits were discussed in detail.    While no guarantees can be made, total hip replacement has a very high success rate in terms of relieving a patient's hip pain and returning them to a more active, independent lifestyle for 10-15 years or more. All surgery carries some risk; for hip replacement, the complication rate is low but may include: death (very rare), infection, bleeding requiring transfusion, blood clots in legs traveling to lungs, nerve and/or blood vessel damage, bone fracture, leg length difference, prosthetic hip dislocation, persistent hip pain and/or stiffness, and repeat surgery(ies). The risk of a major complication is generally 1-2 per 1000 cases. Total hip replacement should only be done if conservative treatment has failed. The predicted revision rate is approximately 1% per year; in other words, 90% of hip replacements last 10 years or more, 80% last 20 years or more, and so on, assuming no injury. Additionally, we discussed anesthesia related complications which will be discussed in greater detail with the anesthesia team before surgery. The patient voiced their understanding of the surgical plan and potential  complications and wishes to proceed with surgery.    History of Present Illness   HPI:  Sheryl Nichols is a 69 y.o. female who presents with pain in the left hip. Patient is no longer getting adequate relief from non-operative modalities. Patient is continuing to have debilitating pain from their hip, interfering with daily activities and sleep. Patient denies any concerns with infections, new neuropathies, or any acute injuries.     Historical Information  Review Of Systems:   Skin: Normal  Neuro: See HPI  Musculoskeletal: See HPI  14 point review of systems negative except as stated above     Past Medical History:   Past Medical History:   Diagnosis Date    Arthritis     Vocal cord nodules     removed       Past Surgical History:   Past Surgical History:   Procedure Laterality Date     SECTION      COLONOSCOPY      LARYNGOSCOPY      with stripping of vocal cords       Family History:  Family history reviewed and non-contributory  Family History   Problem Relation Age of Onset    Lung cancer Mother     Breast cancer Mother 45    Diabetes type I Mother     Lung cancer Father     Cancer Father     Diabetes type I Father     Diabetes Daughter         mellitus    Diabetes Son         mellitus    Mental illness Son         Recently had a change in medication    No Known Problems Sister     No Known Problems Maternal Grandmother     No Known Problems Paternal Grandmother     No Known Problems Daughter     Stroke Son         Recently experienced another stroke    Diabetes Son        Social History:  Social History     Socioeconomic History    Marital status: /Civil Union     Spouse name: None    Number of children: None    Years of education: None    Highest education level: None   Occupational History    None   Tobacco Use    Smoking status: Former     Current packs/day: 0.00     Average packs/day: 0.5 packs/day for 30.0 years (15.0 ttl pk-yrs)     Types: Cigarettes     Start date: 1971     Quit  date: 2001     Years since quittin.3    Smokeless tobacco: Never   Vaping Use    Vaping status: Never Used   Substance and Sexual Activity    Alcohol use: Yes     Alcohol/week: 2.0 standard drinks of alcohol     Types: 2 Glasses of wine per week     Comment: weekly    Drug use: No    Sexual activity: Not Currently     Partners: Male     Birth control/protection: Post-menopausal   Other Topics Concern    None   Social History Narrative    Always uses seat belt    Daily caffeine consumption, 4-5 servings a day     Social Drivers of Health     Financial Resource Strain: Low Risk  (3/6/2023)    Overall Financial Resource Strain (CARDIA)     Difficulty of Paying Living Expenses: Not very hard   Food Insecurity: No Food Insecurity (2025)    Hunger Vital Sign     Worried About Running Out of Food in the Last Year: Never true     Ran Out of Food in the Last Year: Never true   Transportation Needs: No Transportation Needs (2025)    PRAPARE - Transportation     Lack of Transportation (Medical): No     Lack of Transportation (Non-Medical): No   Physical Activity: Not on file   Stress: Not on file   Social Connections: Unknown (2024)    Received from [employer]     How often do you feel lonely or isolated from those around you? (Adult - for ages 18 years and over): Not on file   Intimate Partner Violence: Not on file   Housing Stability: Unknown (2025)    Housing Stability Vital Sign     Unable to Pay for Housing in the Last Year: Patient declined     Number of Times Moved in the Last Year: 0     Homeless in the Last Year: No       Allergies:   No Known Allergies        Labs:  0   Lab Value Date/Time    HCT 38.8 2025 1603    HCT 40.7 04/15/2025 1021    HCT 40.3 10/08/2024 1232    HGB 12.6 2025 1603    HGB 13.1 04/15/2025 1021    HGB 13.1 10/08/2024 1232    INR 0.92 2025 1603    WBC 7.42 2025 1603    WBC 7.39 04/15/2025 1021    WBC 6.02 10/08/2024 1232     "ESR 37 (H) 08/11/2021 1949    CRP 19.5 (H) 08/11/2021 1949       Meds:    Current Facility-Administered Medications:     ceFAZolin (ANCEF) IVPB (premix in dextrose) 2,000 mg 50 mL, 2,000 mg, Intravenous, Once, Chester Barron MD    lactated ringers infusion, 125 mL/hr, Intravenous, Continuous, Chester Barron MD    tranexamic acid (CYKLOKAPRON) 1000-0.7 MG/100ML-% injection 1,000 mg, 1,000 mg, Intravenous, Once, Chester Barron MD    Blood Culture:   No results found for: \"BLOODCX\"    Wound Culture:   No results found for: \"WOUNDCULT\"    Ins and Outs:  No intake/output data recorded.          Physical Exam  /88   Pulse 69   Temp 99.1 °F (37.3 °C) (Temporal)   Resp 18   Wt 70 kg (154 lb 6.4 oz)   LMP  (LMP Unknown)   SpO2 99%   BMI 27.79 kg/m²   /88   Pulse 69   Temp 99.1 °F (37.3 °C) (Temporal)   Resp 18   Wt 70 kg (154 lb 6.4 oz)   LMP  (LMP Unknown)   SpO2 99%   BMI 27.79 kg/m²   Gen: No acute distress, resting comfortably in bed  HEENT: Eyes clear, moist mucus membranes, hearing intact  Respiratory: No audible wheezing or stridor  Cardiovascular: Well Perfused peripherally, 2+ distal pulse  Abdomen: nondistended, no peritoneal signs  Ortho Exam: limited hip ROM due to pain and mechanical blocking  Neuro Exam: intact    Lab Results: Reviewed  Imaging: Reviewed   "

## 2025-05-12 NOTE — PLAN OF CARE
Problem: PAIN - ADULT  Goal: Verbalizes/displays adequate comfort level or baseline comfort level  Description: Interventions:- Encourage patient to monitor pain and request assistance- Assess pain using appropriate pain scale- Administer analgesics based on type and severity of pain and evaluate response- Implement non-pharmacological measures as appropriate and evaluate response- Consider cultural and social influences on pain and pain management- Notify physician/advanced practitioner if interventions unsuccessful or patient reports new pain  Outcome: Progressing     Problem: SAFETY ADULT  Goal: Patient will remain free of falls  Description: INTERVENTIONS:- Educate patient/family on patient safety including physical limitations- Instruct patient to call for assistance with activity - Consult OT/PT to assist with strengthening/mobility - Keep Call bell within reach- Keep bed low and locked with side rails adjusted as appropriate- Keep care items and personal belongings within reach- Initiate and maintain comfort rounds- Make Fall Risk Sign visible to staff- Offer Toileting every  Hours, in advance of need- Initiate/Maintain bed/.chair alarm- Obtain necessary fall risk management equipment: - Apply yellow socks and bracelet for high fall risk patients- Consider moving patient to room near nurses station  Outcome: Progressing     Problem: SAFETY ADULT  Goal: Maintain or return to baseline ADL function  Description: INTERVENTIONS:-  Assess patient's ability to carry out ADLs; assess patient's baseline for ADL function and identify physical deficits which impact ability to perform ADLs (bathing, care of mouth/teeth, toileting, grooming, dressing, etc.)- Assess/evaluate cause of self-care deficits - Assess range of motion- Assess patient's mobility; develop plan if impaired- Assess patient's need for assistive devices and provide as appropriate- Encourage maximum independence but intervene and supervise when  necessary- Involve family in performance of ADLs- Assess for home care needs following discharge - Consider OT consult to assist with ADL evaluation and planning for discharge- Provide patient education as appropriate  Outcome: Progressing     Problem: DISCHARGE PLANNING  Goal: Discharge to home or other facility with appropriate resources  Description: INTERVENTIONS:- Identify barriers to discharge w/patient and caregiver- Arrange for needed discharge resources and transportation as appropriate- Identify discharge learning needs (meds, wound care, etc.)- Arrange for interpretive services to assist at discharge as needed- Refer to Case Management Department for coordinating discharge planning if the patient needs post-hospital services based on physician/advanced practitioner order or complex needs related to functional status, cognitive ability, or social support system  Outcome: Progressing     Problem: Knowledge Deficit  Goal: Patient/family/caregiver demonstrates understanding of disease process, treatment plan, medications, and discharge instructions  Description: Complete learning assessment and assess knowledge base.Interventions:- Provide teaching at level of understanding- Provide teaching via preferred learning methods  Outcome: Progressing     Problem: MUSCULOSKELETAL - ADULT  Goal: Maintain or return mobility to safest level of function  Description: INTERVENTIONS:- Assess patient's ability to carry out ADLs; assess patient's baseline for ADL function and identify physical deficits which impact ability to perform ADLs (bathing, care of mouth/teeth, toileting, grooming, dressing, etc.)- Assess/evaluate cause of self-care deficits - Assess range of motion- Assess patient's mobility- Assess patient's need for assistive devices and provide as appropriate- Encourage maximum independence but intervene and supervise when necessary- Involve family in performance of ADLs- Assess for home care needs following  discharge - Consider OT consult to assist with ADL evaluation and planning for discharge- Provide patient education as appropriate  Outcome: Progressing     Problem: MUSCULOSKELETAL - ADULT  Goal: Maintain proper alignment of affected body part  Description: INTERVENTIONS:- Support, maintain and protect limb and body alignment- Provide patient/ family with appropriate education  Outcome: Progressing

## 2025-05-12 NOTE — ANESTHESIA POSTPROCEDURE EVALUATION
Post-Op Assessment Note    CV Status:  Stable    Pain management: adequate       Mental Status:  Alert and awake   Hydration Status:  Euvolemic   PONV Controlled:  Controlled   Airway Patency:  Patent     Post Op Vitals Reviewed: Yes    No anethesia notable event occurred.    Staff: Anesthesiologist, CRNA           Last Filed PACU Vitals:  Vitals Value Taken Time   Temp 97    Pulse 76    /64    Resp 16    SpO2 100

## 2025-05-13 ENCOUNTER — APPOINTMENT (OUTPATIENT)
Age: 70
End: 2025-05-13
Payer: COMMERCIAL

## 2025-05-13 ENCOUNTER — VBI (OUTPATIENT)
Dept: ADMINISTRATIVE | Facility: OTHER | Age: 70
End: 2025-05-13

## 2025-05-13 VITALS
HEART RATE: 101 BPM | WEIGHT: 154.4 LBS | DIASTOLIC BLOOD PRESSURE: 76 MMHG | TEMPERATURE: 98.1 F | BODY MASS INDEX: 27.79 KG/M2 | OXYGEN SATURATION: 98 % | RESPIRATION RATE: 16 BRPM | SYSTOLIC BLOOD PRESSURE: 125 MMHG

## 2025-05-13 LAB
ANION GAP SERPL CALCULATED.3IONS-SCNC: 7 MMOL/L (ref 4–13)
BUN SERPL-MCNC: 12 MG/DL (ref 5–25)
CALCIUM SERPL-MCNC: 8.4 MG/DL (ref 8.4–10.2)
CHLORIDE SERPL-SCNC: 103 MMOL/L (ref 96–108)
CO2 SERPL-SCNC: 27 MMOL/L (ref 21–32)
CREAT SERPL-MCNC: 0.61 MG/DL (ref 0.6–1.3)
ERYTHROCYTE [DISTWIDTH] IN BLOOD BY AUTOMATED COUNT: 13.9 % (ref 11.6–15.1)
GFR SERPL CREATININE-BSD FRML MDRD: 93 ML/MIN/1.73SQ M
GLUCOSE P FAST SERPL-MCNC: 121 MG/DL (ref 65–99)
GLUCOSE SERPL-MCNC: 121 MG/DL (ref 65–140)
HCT VFR BLD AUTO: 30.8 % (ref 34.8–46.1)
HGB BLD-MCNC: 10.2 G/DL (ref 11.5–15.4)
MCH RBC QN AUTO: 30.3 PG (ref 26.8–34.3)
MCHC RBC AUTO-ENTMCNC: 33.1 G/DL (ref 31.4–37.4)
MCV RBC AUTO: 91 FL (ref 82–98)
PLATELET # BLD AUTO: 216 THOUSANDS/UL (ref 149–390)
PMV BLD AUTO: 10.2 FL (ref 8.9–12.7)
POTASSIUM SERPL-SCNC: 4.4 MMOL/L (ref 3.5–5.3)
RBC # BLD AUTO: 3.37 MILLION/UL (ref 3.81–5.12)
SODIUM SERPL-SCNC: 137 MMOL/L (ref 135–147)
WBC # BLD AUTO: 6.44 THOUSAND/UL (ref 4.31–10.16)

## 2025-05-13 PROCEDURE — 73502 X-RAY EXAM HIP UNI 2-3 VIEWS: CPT

## 2025-05-13 PROCEDURE — 97535 SELF CARE MNGMENT TRAINING: CPT | Performed by: PHYSICAL THERAPIST

## 2025-05-13 PROCEDURE — 99232 SBSQ HOSP IP/OBS MODERATE 35: CPT | Performed by: INTERNAL MEDICINE

## 2025-05-13 PROCEDURE — 85027 COMPLETE CBC AUTOMATED: CPT

## 2025-05-13 PROCEDURE — 80048 BASIC METABOLIC PNL TOTAL CA: CPT

## 2025-05-13 PROCEDURE — 97167 OT EVAL HIGH COMPLEX 60 MIN: CPT

## 2025-05-13 PROCEDURE — 97116 GAIT TRAINING THERAPY: CPT | Performed by: PHYSICAL THERAPIST

## 2025-05-13 PROCEDURE — 97530 THERAPEUTIC ACTIVITIES: CPT | Performed by: PHYSICAL THERAPIST

## 2025-05-13 PROCEDURE — 97535 SELF CARE MNGMENT TRAINING: CPT

## 2025-05-13 PROCEDURE — 99024 POSTOP FOLLOW-UP VISIT: CPT | Performed by: PHYSICIAN ASSISTANT

## 2025-05-13 PROCEDURE — 97163 PT EVAL HIGH COMPLEX 45 MIN: CPT | Performed by: PHYSICAL THERAPIST

## 2025-05-13 RX ADMIN — OXYCODONE HYDROCHLORIDE 5 MG: 5 TABLET ORAL at 15:24

## 2025-05-13 RX ADMIN — ENOXAPARIN SODIUM 40 MG: 100 INJECTION SUBCUTANEOUS at 09:04

## 2025-05-13 RX ADMIN — DOCUSATE SODIUM 100 MG: 100 CAPSULE, LIQUID FILLED ORAL at 09:04

## 2025-05-13 RX ADMIN — OXYCODONE HYDROCHLORIDE 10 MG: 10 TABLET ORAL at 07:30

## 2025-05-13 RX ADMIN — ACETAMINOPHEN 975 MG: 325 TABLET ORAL at 14:04

## 2025-05-13 RX ADMIN — OXYCODONE HYDROCHLORIDE 5 MG: 5 TABLET ORAL at 11:31

## 2025-05-13 RX ADMIN — CEFAZOLIN SODIUM 1000 MG: 1 SOLUTION INTRAVENOUS at 05:23

## 2025-05-13 RX ADMIN — SODIUM CHLORIDE 1000 ML: 0.9 INJECTION, SOLUTION INTRAVENOUS at 07:23

## 2025-05-13 RX ADMIN — ACETAMINOPHEN 975 MG: 325 TABLET ORAL at 07:30

## 2025-05-13 RX ADMIN — GABAPENTIN 100 MG: 100 CAPSULE ORAL at 07:30

## 2025-05-13 RX ADMIN — METHOCARBAMOL 500 MG: 500 TABLET ORAL at 05:23

## 2025-05-13 RX ADMIN — METHOCARBAMOL 500 MG: 500 TABLET ORAL at 11:31

## 2025-05-13 NOTE — ANESTHESIA POSTPROCEDURE EVALUATION
Post-Op Assessment Note    CV Status:  Stable    Pain management: adequate       Mental Status:  Awake   Hydration Status:  Euvolemic   PONV Controlled:  Controlled   Airway Patency:  Patent     Post Op Vitals Reviewed: Yes    No anethesia notable event occurred.    Staff: Anesthesiologist           Last Filed PACU Vitals:  Vitals Value Taken Time   Temp 97.4 °F (36.3 °C) 05/12/25 1515   Pulse 55 05/12/25 1517   /69 05/12/25 1515   Resp 19 05/12/25 1517   SpO2 99 % 05/12/25 1517   Vitals shown include unfiled device data.    Modified Eliu:     Vitals Value Taken Time   Activity 2 05/12/25 1515   Respiration 2 05/12/25 1515   Circulation 2 05/12/25 1515   Consciousness 2 05/12/25 1515   Oxygen Saturation 2 05/12/25 1515     Modified Eliu Score: 10

## 2025-05-13 NOTE — ASSESSMENT & PLAN NOTE
POD 1 s/p left total hip arthroplasty  Weightbearing as tolerated left lower extremity  Posterior hip precautions  Abduction pillow while in bed or in recliner  PT/OT for ambulation training  Analgesics as needed for pain  DVT prophylaxis with Lovenox  Discharge planning

## 2025-05-13 NOTE — PLAN OF CARE
Problem: OCCUPATIONAL THERAPY ADULT  Goal: Performs self-care activities at highest level of function for planned discharge setting.  See evaluation for individualized goals.  Description: Treatment Interventions: ADL retraining, Functional transfer training, Endurance training, Patient/family training, Equipment evaluation/education, Compensatory technique education, Continued evaluation, Energy conservation, Activityengagement  Equipment Recommended: Hip Kit ($)       See flowsheet documentation for full assessment, interventions and recommendations.   Note: Limitation: Decreased ADL status, Decreased endurance, Decreased self-care trans, Decreased high-level ADLs  Prognosis: Good  Assessment: Pt is a 69 y.o. female seen for OT evaluation s/p adm to [facility] on 5/12/2025 w/ Aftercare following left hip joint replacement surgery . Comorbidities affecting pt’s functional performance include a significant PMH of arthritis, hyperlipidemia, osteopenia of neck femur, chronic left hip pain. Pt with active OT orders and activity orders for Activity beginning POD #0. Pt lives w/ spouse in a multilevel house with 8 DARIO. Pt has 1/2 bath on main level with recliner. Tub/shower and standard toilet upstairs with RTS. At baseline, pt was independent with all ADLs/IADLs. Pt completed supine to sit with Ariela. Pt completed sit to stand with Ariela and use of bedrails and RW. Educated pt on posterior hip precautions. See additional treatment session focusing on ADLs/IADLs, transfers and LE management. Upon evaluation, pt currently requires S for UB ADLs, Ariela for LB ADLs, Ariela for toileting, Ariela for bed mobility, Ariela for functional mobility, and Ariela for transfers 2* the following deficits impacting occupational performance: weakness, decreased strength , decreased balance, decreased activity tolerance, increased pain, and orthopedic restrictions. These impairments, as well at pt’s personal factors of: DARIO home environment,  steps within home environment, difficulty performing ADLs, difficulty performing IADLs, difficulty performing transfers/mobility, WBS, fall risk , and new use of AD for functional transfers/mobility limit pt’s ability to safely engage in all baseline areas of occupation. Based on the aforementioned OT evaluation, functional performance deficits, and assessments, pt has been identified as a high complexity evaluation. Pt to continue to benefit from continued acute OT services during hospital stay to address defined deficits and to maximize level of functional independence in the following Occupational Performance areas: grooming, bathing/shower, toilet hygiene, dressing, health maintenance, functional mobility, community mobility, clothing management, cleaning, household maintenance, and job performance/volunteering. From OT standpoint, recommend III; Minimum Resource Intensity upon D/C. OT will continue to follow pt 3-5x/wk.     Rehab Resource Intensity Level, OT: III (Minimum Resource Intensity) (HHOT)

## 2025-05-13 NOTE — PROGRESS NOTES
"Progress Note - Orthopedics   Name: Sheryl Nichols 69 y.o. female I MRN: [id number]  Unit/Bed#: WE 2 N -01 I Date of Admission: 5/12/2025   Date of Service: 5/13/2025 I Hospital Day: 0     Assessment & Plan  Primary osteoarthritis of left hip  POD 1 s/p left total hip arthroplasty  Weightbearing as tolerated left lower extremity  Posterior hip precautions  Abduction pillow while in bed or in recliner  PT/OT for ambulation training  Analgesics as needed for pain  DVT prophylaxis with Lovenox  Discharge planning      Subjective   69 y.o.female POD 1 s/p left total hip arthroplasty no acute events, no new complaints. Pain well controlled. Denies fevers, chills, CP, SOB, N/V, numbness or tingling. Patient reports no issues with urination or bowel movements.     Objective :  Temp:  [97.2 °F (36.2 °C)-99.1 °F (37.3 °C)] 98.7 °F (37.1 °C)  HR:  [37-75] 75  BP: ()/(33-88) 137/80  Resp:  [12-21] 17  SpO2:  [92 %-100 %] 93 %  O2 Device: None (Room air)    Physical Exam  Musculoskeletal: Left hip  Skin clean, dry and intact with no erythema or ecchymosis.  Dressing clean, dry and intact with no evidence of strikethrough  Tenderness to palpation over the lateral aspect of the hip and over the incision site  Motor intact to +FHL/EHL, +ankle dorsi/plantar flexion  Sensation intact to saphenous, sural, tibial, superficial peroneal nerve, and deep peroneal  2+ DP pulse  No calf swelling or tenderness to palpation      Lab Results: I have reviewed the following results:  Recent Labs     05/13/25  0528   WBC 6.44   HGB 10.2*   HCT 30.8*      BUN 12   CREATININE 0.61     Blood Culture:  No results found for: \"BLOODCX\"  Wound Culture: No results found for: \"WOUNDCULT\"    "

## 2025-05-13 NOTE — CASE MANAGEMENT
Case Management Discharge Planning Note    Patient name Sheryl Nichols  Location 2 N /WE 2 N * MRN [id number]  : 1955 Date 2025       Current Admission Date: 2025  Current Admission Diagnosis:Aftercare following left hip joint replacement surgery   Patient Active Problem List    Diagnosis Date Noted Date Diagnosed    Prediabetes 2025     Primary osteoarthritis of left hip 2025     Chronic left hip pain 2025     Aftercare following left hip joint replacement surgery 2025     Radial styloid tenosynovitis (de quervain) 2025     Elevated HDL 2020     Elevated LDL cholesterol level 2020     Other hyperlipidemia 2020     Osteopenia of neck of femur 2018     Pre-operative cardiovascular examination 2018     Mild vitamin D deficiency 2017       LOS (days): 0  Geometric Mean LOS (GMLOS) (days):   Days to GMLOS:     OBJECTIVE:            Current admission status: Outpatient Surgery   Preferred Pharmacy:   [facility]/pharmacy #[id number] - [place], [place] - [address]  [address]  [place] [address]  Phone: [phone] Fax: [phone]    Primary Care Provider: Gómez Peralta MD    Primary Insurance: [employer]  Secondary Insurance: [employer]    DISCHARGE DETAILS:    Discharge planning discussed with:: Pt via phone  Freedom of Choice: Yes  Comments - Freedom of Choice: Pt made aware she choice choice in d/c plan  CM contacted family/caregiver?: No- see comments (CM spoke w/ pt)  Were Treatment Team discharge recommendations reviewed with patient/caregiver?: Yes  Did patient/caregiver verbalize understanding of patient care needs?: Yes  Were patient/caregiver advised of the risks associated with not following Treatment Team discharge recommendations?: Yes       Other Referral/Resources/Interventions Provided:  Interventions: HHA  Referral Comments: Pt refferred to [facility] via [facility] for Home PT         Treatment  Team Recommendation: Home  Discharge Destination Plan:: Home         CM consulted for post-acute home needs. CM called patient to confirm she is agreeable to home PT. CM initiated referral for HHA via [employer]. CM to provide choice list and gather pt choice. CM to close consult.

## 2025-05-13 NOTE — PHYSICAL THERAPY NOTE
PT EVALUATION    Pt. Name: Sheryl Nichols  Pt. Age: 69 y.o.  MRN: [id number]  LENGTH OF STAY: 0    Patient Active Problem List   Diagnosis    Mild vitamin D deficiency    Pre-operative cardiovascular examination    Osteopenia of neck of femur    Other hyperlipidemia    Elevated HDL    Elevated LDL cholesterol level    Radial styloid tenosynovitis (de quervain)    Primary osteoarthritis of left hip    Chronic left hip pain    Aftercare following left hip joint replacement surgery    Prediabetes       Admitting Diagnoses:   Primary osteoarthritis of left hip [M16.12]  Chronic left hip pain [M25.552, G89.29]    Past Medical History:   Diagnosis Date    Arthritis     Primary osteoarthritis of left hip 2025    Vocal cord nodules     removed       Past Surgical History:   Procedure Laterality Date     SECTION      COLONOSCOPY      LARYNGOSCOPY      with stripping of vocal cords    WI ARTHRP ACETBLR/PROX FEM PROSTC AGRFT/ALGRFT Left 2025    Procedure: Left total hip arthroplasty;  Surgeon: Chester Barron MD;  Location: [facility];  Service: Orthopedics       Imaging Studies:  XR hip/pelv 2-3 vws left if performed    (Results Pending)        25 0937   PT Last Visit   PT Visit Date 25   Note Type   Note type Evaluation   Pain Assessment   Pain Assessment Tool 0-10   Pain Score 1   Pain Location/Orientation Orientation: Left;Location: Hip   Hospital Pain Intervention(s) Cold applied;Repositioned;Ambulation/increased activity;Elevated;Emotional support;Rest   Restrictions/Precautions   Weight Bearing Precautions Per Order Yes   LLE Weight Bearing Per Order WBAT   Braces or Orthoses   (hip abduction pillow)   Other Precautions Chair Alarm;Bed Alarm;WBS;THR;Fall Risk;Pain  (posterior hip precautions)   Home Living   Type of Home House   Home Layout Multi-level;Stairs to enter without rails;1/2 bath on main level;Bed/bath upstairs  (8 DARIO R hand rail)   Bathroom Shower/Tub  Tub/shower unit   Bathroom Toilet Standard   Bathroom Equipment Shower chair;Toilet raiser   Home Equipment Walker;Cane   Additional Comments Pt plans to stay on main level with use of recliner and 1/2 bath   Prior Function   Level of [place] Independent with ADLs;Independent with functional mobility;Independent with IADLS  (w/o AD)   Lives With Spouse   Receives Help From Family   IADLs Independent with driving;Independent with medication management;Independent with meal prep   Falls in the last 6 months 0   Vocational Retired   General   Family/Caregiver Present No   Cognition   Overall Cognitive Status WFL   Arousal/Participation Alert   Orientation Level Oriented X4   Following Commands Follows all commands and directions without difficulty   Subjective   Subjective Pt agreeable to PT/OT evaluations.   RUE Assessment   RUE Assessment   (refer to OT)   LUE Assessment   LUE Assessment   (refer to OT)   RLE Assessment   RLE Assessment WFL  (4+/5 grossly)   LLE Assessment   LLE Assessment X   Strength LLE   L Knee Flexion 3/5   L Knee Extension 3/5   L Ankle Dorsiflexion 4+/5   L Ankle Plantar Flexion 4+/5   Light Touch   RLE Light Touch Grossly intact   LLE Light Touch Grossly intact   Bed Mobility   Supine to Sit 4  Minimal assistance   Additional items Assist x 1;HOB elevated;Bedrails;Increased time required;Verbal cues;LE management   Additional Comments Pt greeted in supine. /74. BP /81.   Transfers   Sit to Stand 4  Minimal assistance   Additional items Assist x 1;Bedrails;Increased time required;Verbal cues  (w/ RW)   Stand to Sit 5  Supervision   Additional items Armrests;Increased time required;Verbal cues  (w/ RW)   Toilet transfer 5  Supervision   Additional items Increased time required;Verbal cues;Commode  (BSC over standard toilet; w/ grab bars and RW)   Additional Comments cues for hand placement and maintaining posterior hip precautions   Ambulation/Elevation   Gait pattern Improper  Weight shift;Antalgic;Decreased foot clearance;Decreased L stance;Short stride;Step to;Excessively slow   Gait Assistance 5  Supervision   Additional items Verbal cues   Assistive Device Rolling walker   Distance 10'x2   Ambulation/Elevation Additional Comments significant cues to mainitain posterior hip precautions specifcally with turns and RW; mild lightheadedness with ambulation. RN aware   Balance   Static Sitting Good   Dynamic Sitting Fair +   Static Standing Fair  (w/ RW)   Dynamic Standing Fair -  (w/ RW)   Ambulatory Fair -  (w/ RW)   Activity Tolerance   Activity Tolerance Patient tolerated treatment well   Medical Staff Made Aware OT Vilma   Nurse Made Aware JOSE ENRIQUE Cruz   Assessment   Prognosis Good   Problem List Decreased strength;Impaired balance;Decreased endurance;Decreased range of motion;Orthopedic restrictions;Pain   Assessment Pt. 69 y.o.female presents for elective surgery. Past medical hx includes osteopenia, HLD, prediabetes. Pt admitted for Aftercare following left hip joint replacement surgery w/ Primary osteoarthritis of left hip (M16.12)  Chronic left hip pain (M25.552, G89.29). S/p elective THR (posterior) POD #1. Pt referred to PT for functional mobility evaluation & D/C planning w/ orders of  activity beginning POD#0 . WBAT LLE. Posterior hip precautions with use of hip abduction pillow. PTA, pt reports being I w/o AD. Personal factors affecting pt at time of IE include: decreased independence in ADLs/IADLs, steps to enter, and two story home. During evaluation, deficits included dec mobility, balance, ambulation. Required minAx1 for supine to sit, sit to stand from bed, and S for ambulation, stand to sit, and toilet transfers. Pt able to ambulate 10'x2 with RW in room. Antalgic step to gait with no gross LOB noted. Significant cues required throughout session for maintaining posterior hip precautions. Reinforcement of precautions required for improved safety and carry over. Held on  stair navigation this session due to inc lightheadedness during session. BP stable see flowsheet. Pt demonstrated dec endurance and tolerance to activity. Denies reports of dizziness or SOB t/o session. Pt was educated on fall precautions and reinforced w/ good understanding. Pt would benefit from continued PT to address deficits as defined above and maximize level of independence with functional mobility and safety. Based on pt presentation and impaired function, pt would benefit from level III, (minimum resource intensity) at D/C. The patient's AM-PAC Basic Mobility Inpatient Short Form Raw Score is 18. A Raw score of greater than 16 suggests the patient may benefit from discharge to home. Please also refer to the recommendation of the Physical Therapist for safe discharge planning. Nsg staff to continue to mobilized pt (OOB in chair for all meals & ambulate in room/unit) as tolerated to prevent further decline in function. Nsg notified. Co-eval performed to complete this PT evaluation for the pts best interest given pts medical complexity and functional level.   Barriers to Discharge Inaccessible home environment   Barriers to Discharge Comments DARIO   Goals   Patient Goals to use the bathroom   STG Expiration Date 05/20/25   Short Term Goal #1 1) Inc overall LE strength by 1/2 MMT grade to improve functional mobility; 2) Pt will demonstrate improved bed mobility with mod I to dec caregiver burden; 3) Pt will demonstrate improved transfers w/ mod I for inc safety; 4) Pt will be able to amb w/ mod I >150' w/ RW for household distances to inc safety and dec caregiver burden; 5) Pt will be able to navigate stairs with mod I to dec caregiver burden and inc safety with functional mobility; 6) Improve general balance by 1 grade to inc safety; 7) PT for ongoing patient and caregiver education   PT Treatment Day 0   Plan   Treatment/Interventions Functional transfer training;LE strengthening/ROM;Elevations;Therapeutic  exercise;Endurance training;Patient/family training;Bed mobility;Gait training;Spoke to nursing;OT   PT Frequency Twice a day  (PRN)   Discharge Recommendation   Rehab Resource Intensity Level, PT III (Minimum Resource Intensity)  (HHPT)   Equipment Recommended Walker  (pt owns)   AM-PAC Basic Mobility Inpatient   Turning in Flat Bed Without Bedrails 3   Lying on Back to Sitting on Edge of Flat Bed Without Bedrails 3   Moving Bed to Chair 3   Standing Up From Chair Using Arms 3   Walk in Room 3   Climb 3-5 Stairs With Railing 3   Basic Mobility Inpatient Raw Score 18   Basic Mobility Standardized Score 41.05   [facility] Highest Level Of Mobility   -HLM Goal 6: Walk 10 steps or more   JH-HLM Achieved 6: Walk 10 steps or more   End of Consult   Patient Position at End of Consult Bedside chair;Bed/Chair alarm activated;All needs within reach   End of Consult Comments BP in chair 117/68.       Hx/personal factors: co-morbidities, inaccessible home, advanced age, use of AD, pain, total hip precautions, and fall risk, coping styles, social background, past experience, behavior pattern, post op precautions  Examination: dec mobility, dec balance, dec endurance, dec amb, risk for falls, pain, assessed body system, balance, endurance, amb, D/C disposition & fall risk, impairements in locomotion, musculoskeletal, balance, endurance, posture, coordination, assessed cognition, impairments in systems including multiple body structures involved; musculoskeletal (ROM, strength, posture, BMI), neuromuscular (balance,locomotion, gait, transfers, motor control and learning, sensation), joint integrity, integumentary (skin integrity, presence of scars or wounds), cardiopulmonary (vitals, edema); activity limitations (difficulties executing an action); participation restrictions (problems associated w involvement in life situations)  Clinical: unpredictable (ongoing medical status, risk for falls, imaging test/result pending, and  pain mgt)  Complexity: high      Taya Loya, PT

## 2025-05-13 NOTE — PLAN OF CARE
Problem: PHYSICAL THERAPY ADULT  Goal: Performs mobility at highest level of function for planned discharge setting.  See evaluation for individualized goals.  Description: Treatment/Interventions: Functional transfer training, LE strengthening/ROM, Elevations, Therapeutic exercise, Endurance training, Patient/family training, Bed mobility, Gait training, Spoke to nursing, OT  Equipment Recommended: Walker (pt owns)       See flowsheet documentation for full assessment, interventions and recommendations.  Note: Prognosis: Good  Problem List: Decreased strength, Impaired balance, Decreased endurance, Decreased range of motion, Orthopedic restrictions, Pain  Assessment: Pt. 69 y.o.female presents for elective surgery. Past medical hx includes osteopenia, HLD, prediabetes. Pt admitted for Aftercare following left hip joint replacement surgery w/ Primary osteoarthritis of left hip (M16.12)  Chronic left hip pain (M25.552, G89.29). S/p elective THR (posterior) POD #1. Pt referred to PT for functional mobility evaluation & D/C planning w/ orders of  activity beginning POD#0 . WBAT LLE. Posterior hip precautions with use of hip abduction pillow. PTA, pt reports being I w/o AD. Personal factors affecting pt at time of IE include: decreased independence in ADLs/IADLs, steps to enter, and two story home. During evaluation, deficits included dec mobility, balance, ambulation. Required minAx1 for supine to sit, sit to stand from bed, and S for ambulation, stand to sit, and toilet transfers. Pt able to ambulate 10'x2 with RW in room. Antalgic step to gait with no gross LOB noted. Significant cues required throughout session for maintaining posterior hip precautions. Reinforcement of precautions required for improved safety and carry over. Held on stair navigation this session due to inc lightheadedness during session. BP stable see flowsheet. Pt demonstrated dec endurance and tolerance to activity. Denies reports of dizziness  or SOB t/o session. Pt was educated on fall precautions and reinforced w/ good understanding. Pt would benefit from continued PT to address deficits as defined above and maximize level of independence with functional mobility and safety. Based on pt presentation and impaired function, pt would benefit from level III, (minimum resource intensity) at D/C. The patient's AM-PAC Basic Mobility Inpatient Short Form Raw Score is 18. A Raw score of greater than 16 suggests the patient may benefit from discharge to home. Please also refer to the recommendation of the Physical Therapist for safe discharge planning. Nsg staff to continue to mobilized pt (OOB in chair for all meals & ambulate in room/unit) as tolerated to prevent further decline in function. Nsg notified. Co-eval performed to complete this PT evaluation for the pts best interest given pts medical complexity and functional level.  Barriers to Discharge: Inaccessible home environment  Barriers to Discharge Comments: DARIO  Rehab Resource Intensity Level, PT: III (Minimum Resource Intensity) (HHPT)    See flowsheet documentation for full assessment.

## 2025-05-13 NOTE — TELEPHONE ENCOUNTER
05/13/25 1:58 PM     Chart reviewed for CRC: Colonoscopy ; nothing is submitted to the patient's insurance at this time.     Tomasa Garcia [facility]

## 2025-05-13 NOTE — PLAN OF CARE
Problem: PAIN - ADULT  Goal: Verbalizes/displays adequate comfort level or baseline comfort level  Description: Interventions:- Encourage patient to monitor pain and request assistance- Assess pain using appropriate pain scale- Administer analgesics based on type and severity of pain and evaluate response- Implement non-pharmacological measures as appropriate and evaluate response- Consider cultural and social influences on pain and pain management- Notify physician/advanced practitioner if interventions unsuccessful or patient reports new pain  Outcome: Progressing     Problem: SAFETY ADULT  Goal: Patient will remain free of falls  Description: INTERVENTIONS:- Educate patient/family on patient safety including physical limitations- Instruct patient to call for assistance with activity - Consult OT/PT to assist with strengthening/mobility - Keep Call bell within reach- Keep bed low and locked with side rails adjusted as appropriate- Keep care items and personal belongings within reach- Initiate and maintain comfort rounds- Make Fall Risk Sign visible to staff- Offer Toileting every  Hours, in advance of need- Initiate/Maintain alarm- Obtain necessary fall risk management equipment: Apply yellow socks and bracelet for high fall risk patients- Consider moving patient to room near nurses station  Outcome: Progressing  Goal: Maintain or return to baseline ADL function  Description: INTERVENTIONS:-  Assess patient's ability to carry out ADLs; assess patient's baseline for ADL function and identify physical deficits which impact ability to perform ADLs (bathing, care of mouth/teeth, toileting, grooming, dressing, etc.)- Assess/evaluate cause of self-care deficits - Assess range of motion- Assess patient's mobility; develop plan if impaired- Assess patient's need for assistive devices and provide as appropriate- Encourage maximum independence but intervene and supervise when necessary- Involve family in performance of  ADLs- Assess for home care needs following discharge - Consider OT consult to assist with ADL evaluation and planning for discharge- Provide patient education as appropriate  Outcome: Progressing     Problem: DISCHARGE PLANNING  Goal: Discharge to home or other facility with appropriate resources  Description: INTERVENTIONS:- Identify barriers to discharge w/patient and caregiver- Arrange for needed discharge resources and transportation as appropriate- Identify discharge learning needs (meds, wound care, etc.)- Arrange for interpretive services to assist at discharge as needed- Refer to Case Management Department for coordinating discharge planning if the patient needs post-hospital services based on physician/advanced practitioner order or complex needs related to functional status, cognitive ability, or social support system  Outcome: Progressing     Problem: Knowledge Deficit  Goal: Patient/family/caregiver demonstrates understanding of disease process, treatment plan, medications, and discharge instructions  Description: Complete learning assessment and assess knowledge base.Interventions:- Provide teaching at level of understanding- Provide teaching via preferred learning methods  Outcome: Progressing     Problem: MUSCULOSKELETAL - ADULT  Goal: Maintain or return mobility to safest level of function  Description: INTERVENTIONS:- Assess patient's ability to carry out ADLs; assess patient's baseline for ADL function and identify physical deficits which impact ability to perform ADLs (bathing, care of mouth/teeth, toileting, grooming, dressing, etc.)- Assess/evaluate cause of self-care deficits - Assess range of motion- Assess patient's mobility- Assess patient's need for assistive devices and provide as appropriate- Encourage maximum independence but intervene and supervise when necessary- Involve family in performance of ADLs- Assess for home care needs following discharge - Consider OT consult to assist with  ADL evaluation and planning for discharge- Provide patient education as appropriate  Outcome: Progressing  Goal: Maintain proper alignment of affected body part  Description: INTERVENTIONS:- Support, maintain and protect limb and body alignment- Provide patient/ family with appropriate education  Outcome: Progressing

## 2025-05-13 NOTE — OCCUPATIONAL THERAPY NOTE
Occupational Therapy Evaluation and Treatment     Patient Name: Sheryl Nichols  Today's Date: 2025  Problem List  Principal Problem:    Aftercare following left hip joint replacement surgery  Active Problems:    Primary osteoarthritis of left hip    Past Medical History  Past Medical History:   Diagnosis Date    Arthritis     Primary osteoarthritis of left hip 2025    Vocal cord nodules     removed     Past Surgical History  Past Surgical History:   Procedure Laterality Date     SECTION      COLONOSCOPY      LARYNGOSCOPY      with stripping of vocal cords    [place] ARTHRP ACETBLR/PROX FEM PROSTC AGRFT/ALGRFT Left 2025    Procedure: Left total hip arthroplasty;  Surgeon: Chester Barron MD;  Location:  MAIN OR;  Service: Orthopedics        25   OT Last Visit   OT Visit Date 25   Note Type   Note type Evaluation   Additional Comments pt greeted in supine, agreeable to OT evaluation   Pain Assessment   Pain Assessment Tool 0-10   Pain Score 1  (at rest)   Pain Location/Orientation Orientation: Left;Location: Hip   Hospital Pain Intervention(s) Repositioned;Ambulation/increased activity;Elevated;Emotional support;Rest   Restrictions/Precautions   Weight Bearing Precautions Per Order Yes   LLE Weight Bearing Per Order WBAT   Braces or Orthoses Other (Comment)  (hip abduction pillow)   Other Precautions Chair Alarm;Bed Alarm;WBS;THR;Multiple lines;Fall Risk;Pain  (posterior hip precautions)   Home Living   Type of Home House   Home Layout Multi-level;Stairs to enter without rails;1/2 bath on main level;Bed/bath upstairs  (8 DARIO R rail)   Bathroom Shower/Tub Tub/shower unit   Bathroom Toilet Standard   Bathroom Equipment Shower chair;Toilet raiser   Bathroom Accessibility Accessible   Home Equipment Walker;Cane   Additional Comments Pt plans to stay on main level with use of recliner and 1/2 bath   Prior Function   Level of [place] Independent with  ADLs;Independent with functional mobility;Independent with IADLS   Lives With Spouse   Receives Help From Family   IADLs Independent with driving;Independent with medication management;Independent with meal prep   Falls in the last 6 months 0   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)   Comments (+) , no AD use at baseline   Lifestyle   Autonomy Independent with all ADLs/IADLs, no AD use at baseline   Reciprocal Relationships spouse   Service to Others retired   Intrinsic Gratification beach vacation   General   Family/Caregiver Present No   ADL   Where Assessed Edge of bed   Eating Assistance 5  Supervision/Setup   Grooming Assistance 5  Supervision/Setup   UB Bathing Assistance 5  Supervision/Setup   LB Bathing Assistance 4  Minimal Assistance   UB Dressing Assistance 5  Supervision/Setup   LB Dressing Assistance 4  Minimal Assistance   Toileting Assistance  4  Minimal Assistance   Bed Mobility   Supine to Sit 4  Minimal assistance   Additional items Assist x 1;HOB elevated;Bedrails;Increased time required;LE management;Verbal cues   Sit to Supine Unable to assess   Additional Comments Pt greeted in supine. /74. BP /81.   Transfers   Sit to Stand 4  Minimal assistance   Additional items Assist x 1;Bedrails;Increased time required;Verbal cues  (RW)   Stand to Sit 5  Supervision   Additional items Armrests;Increased time required;Verbal cues  (RW)   Toilet transfer 5  Supervision   Additional items Increased time required;Verbal cues;Standard toilet;Commode  (BSC over standard toilet with use of grab bar and RW)   Additional Comments cues for hand placement and maintaining posterior hip precautions   Functional Mobility   Functional Mobility 4  Minimal assistance   Additional Comments Pt completed functional mobility in room distance from EOB to bathroom then to chair with Ariela and use of RW.   Additional items Rolling walker   Balance   Static Sitting Good   Dynamic Sitting Fair +   Static  Standing Fair   Dynamic Standing Fair -   Ambulatory Fair -   Activity Tolerance   Activity Tolerance Patient tolerated treatment well   Medical Staff Made Aware PT Taya, Pt seen for co-evaluation/treatment with skilled Physical Therapy due to pt's medical complexity, decreased endurance, overall functional level, overall safety, and post surgical day #1.   Nurse Made Aware RN C   RUE Assessment   RUE Assessment WFL   LUE Assessment   LUE Assessment WFL   Hand Function   Gross Motor Coordination Functional   Fine Motor Coordination Functional   Cognition   Overall Cognitive Status WFL   Arousal/Participation Alert;Cooperative   Attention Within functional limits   Orientation Level Oriented X4   Memory Within functional limits   Following Commands Follows all commands and directions without difficulty   Comments Cooperative and pleasant   Assessment   Limitation Decreased ADL status;Decreased endurance;Decreased self-care trans;Decreased high-level ADLs   Prognosis Good   Assessment Pt is a 69 y.o. female seen for OT evaluation s/p adm to [facility] on 5/12/2025 w/ Aftercare following left hip joint replacement surgery . Comorbidities affecting pt’s functional performance include a significant PMH of arthritis, hyperlipidemia, osteopenia of neck femur, chronic left hip pain. Pt with active OT orders and activity orders for Activity beginning POD #0. Pt lives w/ spouse in a multilevel house with 8 DARIO. Pt has 1/2 bath on main level with recliner. Tub/shower and standard toilet upstairs with RTS. At baseline, pt was independent with all ADLs/IADLs. Pt completed supine to sit with Ariela. Pt completed sit to stand with Ariela and use of bedrails and RW. Educated pt on posterior hip precautions. See additional treatment session focusing on ADLs/IADLs, transfers and LE management. Upon evaluation, pt currently requires S for UB ADLs, Ariela for LB ADLs, Ariela for toileting, Ariela for bed mobility, Ariela for functional  mobility, and Ariela for transfers 2* the following deficits impacting occupational performance: weakness, decreased strength , decreased balance, decreased activity tolerance, increased pain, and orthopedic restrictions. These impairments, as well at pt’s personal factors of: DARIO home environment, steps within home environment, difficulty performing ADLs, difficulty performing IADLs, difficulty performing transfers/mobility, WBS, fall risk , and new use of AD for functional transfers/mobility limit pt’s ability to safely engage in all baseline areas of occupation. Based on the aforementioned OT evaluation, functional performance deficits, and assessments, pt has been identified as a high complexity evaluation. Pt to continue to benefit from continued acute OT services during hospital stay to address defined deficits and to maximize level of functional independence in the following Occupational Performance areas: grooming, bathing/shower, toilet hygiene, dressing, health maintenance, functional mobility, community mobility, clothing management, cleaning, household maintenance, and job performance/volunteering. From OT standpoint, recommend III; Minimum Resource Intensity upon D/C. OT will continue to follow pt 3-5x/wk.   Goals   Patient Goals to use the bathroom   STG Time Frame 1-3   Short Term Goal #1 Pt will demonstrate 100% adherence to 3/3 posterolateral hip precautions during all functional activities w/o cues from therapist   Short Term Goal #2 Pt will be able to state 3/3 posterolateral hip precautions w/o cues from therapist 100% of the time each session to increase safety at home and reduce risk of injury   Short Term Goal  Pt will improve activity tolerance to G for min 30 min treatment sessions for increase engagement in functional tasks   LTG Time Frame 3-7   Long Term Goal #1 Pt will complete bed mobility at a mod I level w/ G balance/safety demonstrated to decrease caregiver assistance required   Long  Term Goal #2 Pt will complete LB dressing/self care w/ mod I using adaptive device and DME as needed   Long Term Goal Pt will complete toileting w/ mod I w/ G hygiene/thoroughness using DME as needed   Plan   Treatment Interventions ADL retraining;Functional transfer training;Endurance training;Patient/family training;Equipment evaluation/education;Compensatory technique education;Continued evaluation;Energy conservation;Activityengagement   Goal Expiration Date 05/20/25   OT Treatment Day 0   OT Frequency 3-5x/wk BID PRN   Discharge Recommendation   Rehab Resource Intensity Level, OT III (Minimum Resource Intensity)  (HHOT)   Equipment Recommended Hip Kit ($)   Additional Comments  The patient's raw score on the AM-PAC Daily Activity Inpatient Short Form is  19. A raw score of greater than or equal to 19 suggests the patient may benefit from discharge to home. Please refer to the recommendation of the Occupational Therapist for safe discharge planning.   AM-PAC Daily Activity Inpatient   Lower Body Dressing 2   Bathing 3   Toileting 3   Upper Body Dressing 4   Grooming 3   Eating 4   Daily Activity Raw Score 19   Daily Activity Standardized Score (Calc for Raw Score >=11) 40.22   -PAC Applied Cognition Inpatient   Following a Speech/Presentation 4   Understanding Ordinary Conversation 4   Taking Medications 4   Remembering Where Things Are Placed or Put Away 4   Remembering List of 4-5 Errands 4   Taking Care of Complicated Tasks 4   Applied Cognition Raw Score 24   Applied Cognition Standardized Score 62.21   Additional Treatment Session   Start Time 0925   End Time 0937   Treatment Assessment Pt seen for OT treatment session focusing on ADLs/IADLs, functional mobility, functional standing tolerance, functional transfers, patient education, continued evaluation. Pt alert and cooperative throughout session. Pt with good sitting balance and fair - dynamic standing balance. Pt tolerated treatment well. Pt completed  functional mobility from EOB to bathroom with Ariela and use of RW during treatment today. Pt completed toilet transfer onto [facility] over standard toilet with S and use of grab bars. Pt completed toileting and perineal hygiene in standing with Ariela. Pt completed functional mobility back to chair with Ariela and use of RW. Pt completed stand to sit into chair with use of armrests and S. Pt with fair carryover over posterior hip precautions during session. Pt reports 4/10 pain and no dizziness. Pt's vitals include: stable.     Pt ended session seated OOB in recliner. Call bell and phone within reach. All needs met and pt reports no further questions at this time. Continue to recommend III; Minimum Resource Intensity when medically cleared. OT will continue to follow pt on caseload.   Additional Treatment Day 1   End of Consult   Education Provided Yes   Patient Position at End of Consult Bedside chair;Bed/Chair alarm activated;All needs within reach   Nurse Communication Nurse aware of consult   End of Consult Comments Pt seated OOB in chair with chair alarm activated at end of session. Call bell and phone within reach. All needs met and pt reports no further questions for OT at this time.   Vilma Ruiz, OT

## 2025-05-13 NOTE — PLAN OF CARE
Problem: OCCUPATIONAL THERAPY ADULT  Goal: Performs self-care activities at highest level of function for planned discharge setting.  See evaluation for individualized goals.  Description: Treatment Interventions: ADL retraining, Functional transfer training, Endurance training, Patient/family training, Equipment evaluation/education, Compensatory technique education, Continued evaluation, Energy conservation, Activityengagement  Equipment Recommended: Hip Kit ($)       See flowsheet documentation for full assessment, interventions and recommendations.   5/13/2025 1606 by Vilma Ruiz OT  Outcome: Adequate for Discharge  Note: Limitation: Decreased ADL status, Decreased endurance, Decreased self-care trans, Decreased high-level ADLs  Prognosis: Good  Assessment: Pt seen for OT treatment session focusing on ADLs/IADLs, functional mobility, functional standing tolerance, functional transfers, patient education, continued evaluation. Pt greeted OOB in recliner at start of session. Pt alert and cooperative throughout session. Pt with good sitting balance and fair - dynamic standing balance. Pt tolerated treatment well. Pt completed don of underwear and pants with use of reacher seated in chair, then standing to pull over waist with use of RW and S. Pt completed don of shirt with S seated in chair. Pt then completed doff of socks with use of dressing stick and don of socks with use of sock aid. Pt educated on all tools in hip kit. Pt reported she has reacher at home and will purchase sock aid on [employer]. Denied purchasing hip kit at this time as spouse will also assist pt with ADLs/IADLs. Pt educated on all ADLs/IADLs, transfers, and LE management while also maintaining posterior hip precautions with independence at home. Pt reports 4/10 pain and no dizziness. Pt's vitals include: stable.     Pt ended session seated OOB in recliner. Call bell and phone within reach. All needs met and pt reports no further questions at  this time. Continue to recommend No post-acute rehabilitation needs when medically cleared. OT will continue to follow pt on caseload.     Rehab Resource Intensity Level, OT: No post-acute rehabilitation needs

## 2025-05-13 NOTE — PLAN OF CARE
Problem: PAIN - ADULT  Goal: Verbalizes/displays adequate comfort level or baseline comfort level  Description: Interventions:- Encourage patient to monitor pain and request assistance- Assess pain using appropriate pain scale- Administer analgesics based on type and severity of pain and evaluate response- Implement non-pharmacological measures as appropriate and evaluate response- Consider cultural and social influences on pain and pain management- Notify physician/advanced practitioner if interventions unsuccessful or patient reports new pain  Outcome: Progressing     Problem: SAFETY ADULT  Goal: Patient will remain free of falls  Description: INTERVENTIONS:- Educate patient/family on patient safety including physical limitations- Instruct patient to call for assistance with activity - Consult OT/PT to assist with strengthening/mobility - Keep Call bell within reach- Keep bed low and locked with side rails adjusted as appropriate- Keep care items and personal belongings within reach- Initiate and maintain comfort rounds- Make Fall Risk Sign visible to staff- Offer Toileting every 2 Hours, in advance of need- Initiate/Maintain bed/chair alarm- Obtain necessary fall risk management equipment: rolling walker- Apply yellow socks and bracelet for high fall risk patients- Consider moving patient to room near nurses station  Outcome: Progressing  Goal: Maintain or return to baseline ADL function  Description: INTERVENTIONS:-  Assess patient's ability to carry out ADLs; assess patient's baseline for ADL function and identify physical deficits which impact ability to perform ADLs (bathing, care of mouth/teeth, toileting, grooming, dressing, etc.)- Assess/evaluate cause of self-care deficits - Assess range of motion- Assess patient's mobility; develop plan if impaired- Assess patient's need for assistive devices and provide as appropriate- Encourage maximum independence but intervene and supervise when necessary- Involve  family in performance of ADLs- Assess for home care needs following discharge - Consider OT consult to assist with ADL evaluation and planning for discharge- Provide patient education as appropriate  Outcome: Progressing     Problem: DISCHARGE PLANNING  Goal: Discharge to home or other facility with appropriate resources  Description: INTERVENTIONS:- Identify barriers to discharge w/patient and caregiver- Arrange for needed discharge resources and transportation as appropriate- Identify discharge learning needs (meds, wound care, etc.)- Arrange for interpretive services to assist at discharge as needed- Refer to Case Management Department for coordinating discharge planning if the patient needs post-hospital services based on physician/advanced practitioner order or complex needs related to functional status, cognitive ability, or social support system  Outcome: Progressing     Problem: Knowledge Deficit  Goal: Patient/family/caregiver demonstrates understanding of disease process, treatment plan, medications, and discharge instructions  Description: Complete learning assessment and assess knowledge base.Interventions:- Provide teaching at level of understanding- Provide teaching via preferred learning methods  Outcome: Progressing     Problem: MUSCULOSKELETAL - ADULT  Goal: Maintain or return mobility to safest level of function  Description: INTERVENTIONS:- Assess patient's ability to carry out ADLs; assess patient's baseline for ADL function and identify physical deficits which impact ability to perform ADLs (bathing, care of mouth/teeth, toileting, grooming, dressing, etc.)- Assess/evaluate cause of self-care deficits - Assess range of motion- Assess patient's mobility- Assess patient's need for assistive devices and provide as appropriate- Encourage maximum independence but intervene and supervise when necessary- Involve family in performance of ADLs- Assess for home care needs following discharge - Consider OT  consult to assist with ADL evaluation and planning for discharge- Provide patient education as appropriate  Outcome: Progressing  Goal: Maintain proper alignment of affected body part  Description: INTERVENTIONS:- Support, maintain and protect limb and body alignment- Provide patient/ family with appropriate education  Outcome: Progressing

## 2025-05-13 NOTE — PLAN OF CARE
Problem: PHYSICAL THERAPY ADULT  Goal: Performs mobility at highest level of function for planned discharge setting.  See evaluation for individualized goals.  Description: Treatment/Interventions: Functional transfer training, LE strengthening/ROM, Elevations, Therapeutic exercise, Endurance training, Patient/family training, Bed mobility, Gait training, Spoke to nursing, OT  Equipment Recommended: Walker (pt owns)       See flowsheet documentation for full assessment, interventions and recommendations.  5/13/2025 1446 by Taya Loya PT  Outcome: Adequate for Discharge  Note: Prognosis: Good  Problem List: Decreased strength, Impaired balance, Decreased endurance, Decreased range of motion, Orthopedic restrictions, Pain  Assessment: Patient was seen today per POC. Overall, pt demonstrated improved mobility and inc tolerance to activity. Educated pt and spouse on posterior hip precautions with good understanding. Reviewed throughout session for improved carryover. Pain 4/10 in L hip. Required S for sit<>stand, ambulation, and stair navigation. Pt able to ambulate 90' with RW and S in unit. Antalgic step through gait with no gross LOB noted. Able to perform nonreciprocal stair navigation with unilateral hand rail to simulate home setup. Reviewed HEP and provided handout. Significant education provided to pt regarding post op care and mobility. All questions and concerns addressed at this time. No reports of dizziness t/o session. Will continue to see pt per POC as tolerated. From PT standpoint continued recommendation for level III, (minimum resource intensity) at D/C when medically cleared based current function. The patient's AM-PAC Basic Mobility Inpatient Short Form Raw Score is 23. A Raw score of greater than 16 suggests the patient may benefit from discharge to home. Please also refer to the recommendation of the Physical Therapist for safe discharge planning. [facility] staff to continue to mobilized pt (OOB  in chair for all meals & ambulate in room/unit) as tolerated to prevent further decline in function. Nsg staff notified. From PT stand point, pt cleared functionally for D/C when medically appropriate.  Barriers to Discharge: None  Barriers to Discharge Comments: From PT stand point, pt cleared functionally for D/C when medically appropriate.  Rehab Resource Intensity Level, PT: III (Minimum Resource Intensity) (HHPT)    See flowsheet documentation for full assessment.

## 2025-05-13 NOTE — OCCUPATIONAL THERAPY NOTE
Occupational Therapy Progress Note     Patient Name: Sheryl Nichols  Today's Date: 5/13/2025  Problem List  Principal Problem:    Aftercare following left hip joint replacement surgery  Active Problems:    Primary osteoarthritis of left hip       05/13/25 1428   OT Last Visit   OT Visit Date 05/13/25   Note Type   Note Type BID visit/treatment   Pain Assessment   Pain Assessment Tool 0-10   Pain Score 4   Pain Location/Orientation Orientation: Left;Location: Hip   Hospital Pain Intervention(s) Repositioned;Ambulation/increased activity;Elevated;Emotional support;Rest   Restrictions/Precautions   Weight Bearing Precautions Per Order Yes   LLE Weight Bearing Per Order WBAT   Braces or Orthoses Other (Comment)  (hip abduction pillow)   Other Precautions Chair Alarm;WBS;THR;Fall Risk;Pain  (posterior hip precautions)   Lifestyle   Autonomy Independent with all ADLs/IADLs, no AD use at baseline   Reciprocal Relationships spouse   Service to Others retired   Intrinsic Gratification beach vacation   ADL   Where Assessed Chair   Equipment Provided Reacher;Sock aid;Long-handled shoe horn;Dressing stick   UB Dressing Assistance 5  Supervision/Setup   UB Dressing Deficit Setup;Verbal cueing;Increased time to complete;Supervision/safety   LB Dressing Assistance 5  Supervision/Setup   LB Dressing Deficit Setup;Steadying;Verbal cueing;Supervision/safety;Increased time to complete;Use of adaptive equipment;Requires assistive device for steadying   Functional Standing Tolerance   Time ~1 min   Activity standing for dressing tasks   Comments use of RW   Bed Mobility   Supine to Sit Unable to assess   Sit to Supine Unable to assess   Additional Comments pt greeted OOB in recliner   Transfers   Sit to Stand 5  Supervision   Additional items Armrests;Increased time required;Verbal cues  (RW)   Stand to Sit 5  Supervision   Additional items Armrests;Increased time required;Verbal cues  (RW)   Additional Comments verbal cues for  hand placement and to maintain posterior hip precautions during session   Functional Mobility   Functional Mobility 5  Supervision   Additional Comments Pt completed short functional mobility in room distance with use of RW and S   Additional items Rolling walker   Cognition   Overall Cognitive Status WFL   Arousal/Participation Alert;Cooperative   Attention Within functional limits   Orientation Level Oriented X4   Memory Within functional limits   Following Commands Follows all commands and directions without difficulty   Comments Cooperative and motivated   Activity Tolerance   Activity Tolerance Patient tolerated treatment well   Medical Staff Made Aware RN C   Assessment   Assessment Pt seen for OT treatment session focusing on ADLs/IADLs, functional mobility, functional standing tolerance, functional transfers, patient education, continued evaluation. Pt greeted OOB in recliner at start of session. Pt alert and cooperative throughout session. Pt with good sitting balance and fair - dynamic standing balance. Pt tolerated treatment well. Pt completed don of underwear and pants with use of reacher seated in chair, then standing to pull over waist with use of RW and S. Pt completed don of shirt with S seated in chair. Pt then completed doff of socks with use of dressing stick and don of socks with use of sock aid. Pt educated on all tools in hip kit. Pt reported she has reacher at home and will purchase sock aid on [email or web address]. Denied purchasing hip kit at this time as spouse will also assist pt with ADLs/IADLs. Pt educated on all ADLs/IADLs, transfers, and LE management while also maintaining posterior hip precautions with independence at home. Pt reports 4/10 pain and no dizziness. Pt's vitals include: stable.     Pt ended session seated OOB in recliner. Call bell and phone within reach. All needs met and pt reports no further questions at this time. Continue to recommend No post-acute rehabilitation needs when  medically cleared. OT will continue to follow pt on caseload.   Plan   Treatment Interventions ADL retraining;Functional transfer training;Endurance training;Patient/family training;Equipment evaluation/education;Compensatory technique education;Continued evaluation;Energy conservation;Activityengagement   Goal Expiration Date 05/20/25   OT Treatment Day 1   OT Frequency 3-5x/wk  (BID prn)   Discharge Recommendation   Rehab Resource Intensity Level, OT No post-acute rehabilitation needs   Additional Comments  The patient's raw score on the AM-PAC Daily Activity Inpatient Short Form is 21. A raw score of greater than or equal to 19 suggests the patient may benefit from discharge to home. Please refer to the recommendation of the Occupational Therapist for safe discharge planning.   AM-PAC Daily Activity Inpatient   Lower Body Dressing 3   Bathing 3   Toileting 3   Upper Body Dressing 4   Grooming 4   Eating 4   Daily Activity Raw Score 21   Daily Activity Standardized Score (Calc for Raw Score >=11) 44.27   AM-PAC Applied Cognition Inpatient   Following a Speech/Presentation 4   Understanding Ordinary Conversation 4   Taking Medications 4   Remembering Where Things Are Placed or Put Away 4   Remembering List of 4-5 Errands 4   Taking Care of Complicated Tasks 4   Applied Cognition Raw Score 24   Applied Cognition Standardized Score 62.21   End of Consult   Education Provided Yes;Family or social support of family present for education by provider   Patient Position at End of Consult Bedside chair;All needs within reach   Nurse Communication Nurse aware of consult   Vilma Ruiz OT

## 2025-05-13 NOTE — PHYSICAL THERAPY NOTE
PT PROGRESS NOTE    Name: Sheryl Nichols  AGE: 69 y.o.  MRN: [id number]  LENGTH OF STAY: 0     05/13/25 1429   PT Last Visit   PT Visit Date 05/13/25   Note Type   Note Type BID visit/treatment   Pain Assessment   Pain Assessment Tool 0-10   Pain Score 4   Pain Location/Orientation Orientation: Left;Location: Hip   Hospital Pain Intervention(s) Repositioned;Ambulation/increased activity;Elevated;Emotional support;Rest   Restrictions/Precautions   Weight Bearing Precautions Per Order Yes   LLE Weight Bearing Per Order WBAT   Braces or Orthoses   (hip abdction pillow)   Other Precautions Chair Alarm;THR;Pain;Fall Risk  (posterior hip precautions)   General   Chart Reviewed Yes   Response to Previous Treatment Patient with no complaints from previous session.   Family/Caregiver Present Yes   Cognition   Overall Cognitive Status WFL   Arousal/Participation Alert;Cooperative   Attention Within functional limits   Orientation Level Oriented X4   Following Commands Follows all commands and directions without difficulty   Subjective   Subjective I am doing better.   Bed Mobility   Additional Comments Pt greeted OOB in chair.   Transfers   Sit to Stand 5  Supervision   Additional items Armrests;Increased time required;Verbal cues  (w/ RW)   Stand to Sit 5  Supervision   Additional items Armrests;Increased time required;Verbal cues  (w/ RW)   Additional Comments cues for hand placement and to maintain posterior hip precautions   Ambulation/Elevation   Gait pattern Improper Weight shift;Antalgic;Decreased foot clearance;Decreased L stance;Short stride;Step through pattern   Gait Assistance 5  Supervision   Additional items Verbal cues   Assistive Device Rolling walker   Distance 90'x1   Stair Management Assistance 5  Supervision   Additional items Verbal cues   Stair Management Technique One rail R;Step to pattern;Foreward;Nonreciprocal   Number of Stairs 5  (x2 stair trainers)   Ambulation/Elevation Additional  Comments cues to maintain posterior hip precautions with use of RW   Balance   Static Sitting Normal   Dynamic Sitting Good   Static Standing Fair +  (w/ RW)   Dynamic Standing Fair  (w/ RW)   Ambulatory Fair  (w/ RW)   Activity Tolerance   Activity Tolerance Patient tolerated treatment well   Nurse Made Aware RN Anthony   Exercises   THR   (Reviewed HEP and provided handout. Significant education overall provided to pt.)   Assessment   Prognosis Good   Problem List Decreased strength;Impaired balance;Decreased endurance;Decreased range of motion;Orthopedic restrictions;Pain   Assessment Patient was seen today per POC. Overall, pt demonstrated improved mobility and inc tolerance to activity. Educated pt and spouse on posterior hip precautions with good understanding. Reviewed throughout session for improved carryover. Pain 4/10 in L hip. Required S for sit<>stand, ambulation, and stair navigation. Pt able to ambulate 90' with RW and S in unit. Antalgic step through gait with no gross LOB noted. Able to perform nonreciprocal stair navigation with unilateral hand rail to simulate home setup. Reviewed HEP and provided handout. Significant education provided to pt regarding post op care and mobility. All questions and concerns addressed at this time. No reports of dizziness t/o session. Will continue to see pt per POC as tolerated. From PT standpoint continued recommendation for level III, (minimum resource intensity) at D/C when medically cleared based current function. The patient's AM-PAC Basic Mobility Inpatient Short Form Raw Score is 23. A Raw score of greater than 16 suggests the patient may benefit from discharge to home. Please also refer to the recommendation of the Physical Therapist for safe discharge planning. Nsg staff to continue to mobilized pt (OOB in chair for all meals & ambulate in room/unit) as tolerated to prevent further decline in function. Nsg staff notified. From PT stand point, pt cleared  functionally for D/C when medically appropriate.   Barriers to Discharge None   Barriers to Discharge Comments From PT stand point, pt cleared functionally for D/C when medically appropriate.   Goals   Patient Goals to walk   STG Expiration Date 05/20/25   PT Treatment Day 1   Plan   Treatment/Interventions Functional transfer training;LE strengthening/ROM;Elevations;Therapeutic exercise;Endurance training;Patient/family training;Gait training;Bed mobility;Spoke to nursing;OT   Progress Progressing toward goals   PT Frequency Twice a day  (PRN)   Discharge Recommendation   Rehab Resource Intensity Level, PT III (Minimum Resource Intensity)  (HHPT)   AM-PAC Basic Mobility Inpatient   Turning in Flat Bed Without Bedrails 4   Lying on Back to Sitting on Edge of Flat Bed Without Bedrails 4   Moving Bed to Chair 4   Standing Up From Chair Using Arms 4   Walk in Room 4   Climb 3-5 Stairs With Railing 3   Basic Mobility Inpatient Raw Score 23   Basic Mobility Standardized Score 50.88   [facility] Highest Level Of Mobility   JH-HLM Goal 7: Walk 25 feet or more   JH-HLM Achieved 7: Walk 25 feet or more   Education   Education Provided Mobility training;Home exercise program;Precautions for total hip arthroplasty (RENAY);Assistive device   Patient Demonstrates acceptance/verbal understanding   End of Consult   Patient Position at End of Consult Bedside chair;All needs within reach       Taya Loya, PT

## 2025-05-13 NOTE — CASE MANAGEMENT
Case Management Discharge Planning Note    Patient name Sheryl Nichols  Location 2 N /[facility] 2 N * MRN [id number]  : 1955 Date 2025       Current Admission Date: 2025  Current Admission Diagnosis:Aftercare following left hip joint replacement surgery   Patient Active Problem List    Diagnosis Date Noted Date Diagnosed    Prediabetes 2025     Primary osteoarthritis of left hip 2025     Chronic left hip pain 2025     Aftercare following left hip joint replacement surgery 2025     Radial styloid tenosynovitis (de quervain) 2025     Elevated HDL 2020     Elevated LDL cholesterol level 2020     Other hyperlipidemia 2020     Osteopenia of neck of femur 2018     Pre-operative cardiovascular examination 2018     Mild vitamin D deficiency 2017       LOS (days): 0  Geometric Mean LOS (GMLOS) (days):   Days to GMLOS:     OBJECTIVE:            Current admission status: Outpatient Surgery   Preferred Pharmacy:   [facility]/pharmacy #[id number] - [place], [place] - [address]  [address]  [place] [address]  Phone: [phone] Fax: [phone]    Primary Care Provider: Gómez Peralta MD    Primary Insurance: [employer]  Secondary Insurance: [employer]    DISCHARGE DETAILS:    Discharge planning discussed with:: Pt via phone  Freedom of Choice: Yes  Comments - Freedom of Choice: Pt made aware she choice choice in d/c plan  CM contacted family/caregiver?: No- see comments (CM spoke w/ pt)  Were Treatment Team discharge recommendations reviewed with patient/caregiver?: Yes  Did patient/caregiver verbalize understanding of patient care needs?: Yes  Were patient/caregiver advised of the risks associated with not following Treatment Team discharge recommendations?: Yes         Requested Home Health Care         Is the patient interested in HHC at discharge?: Yes  Home Health Discipline requested:: Physical Therapy  Home  Health Agency Name:: Residential ([facility])  HHA External Referral Reason (only applicable if external HHA name selected): Services not provided in network or near patient location  Home Health Follow-Up Provider:: Referring Provider  Home Health Services Needed::  (Physical Therapy)  Homebound Criteria Met:: Requires the Assistance of Another Person for Safe Ambulation or to Leave the Home, Uses an Assist Device (i.e. cane, walker, etc)  Supporting Clincal Findings:: Limited Endurance    DME Referral Provided  Referral made for DME?: No    Other Referral/Resources/Interventions Provided:  Interventions: HHA  Referral Comments: Pt refferred to [facility] via [facility] for Home PT         Treatment Team Recommendation: Home  Discharge Destination Plan:: Home      CM spoke with patient via phone and reviewed choice list. Pt chose   [facility]    Home Care [address] [place] [id number]  Phone: [phone]  Fax: [phone]    CM reserved provider in [employer], completed face to face and added provider to follow-up providers.

## 2025-05-14 ENCOUNTER — TELEPHONE (OUTPATIENT)
Dept: OBGYN CLINIC | Facility: HOSPITAL | Age: 70
End: 2025-05-14

## 2025-05-15 ENCOUNTER — TELEPHONE (OUTPATIENT)
Age: 70
End: 2025-05-15

## 2025-05-15 ENCOUNTER — PATIENT OUTREACH (OUTPATIENT)
Dept: OBGYN CLINIC | Facility: HOSPITAL | Age: 70
End: 2025-05-15

## 2025-05-15 NOTE — TELEPHONE ENCOUNTER
"Patient contacted for a postoperative follow up assessment. Patient reports doing \"better today than yesterday\"  Patient states current pain level of a  0/10  when sitting and 3/10 when walking with RW.  Patient reports swelling to thigh and calf area and dressing is clean, dry and intact. Patient is icing the site regularly.    We reviewed patients AVS medication list. Patient is taking Tylenol 1000mg every 8 hours, Robaxin 500mg TID PRN,  Oxycodone 5mg PRN, lovenox inj daily. Patient has not yet had a BM but is passing gas.  Recommended Colace 100mg BID or Senokot daily, increasing fluid and fiber intake- adding prune juice to regimen.     Patient denies nausea, vomiting, abdominal pain, chest pain, shortness of breath, fever, dizziness and calf pain. Patient confirmed post-op appointment with surgeon on 05/23. Patient states PT is coming to the house today for first session. Patient does not have any other questions or concerns at this time. Pt was encouraged to call with any questions, concerns or issues.    "

## 2025-05-15 NOTE — PROGRESS NOTES
SWCM contacted pt today to follow up postoperatively. Pt reports she is doing well and getting better everyday. Pt also shares that she started in home PT today. No other needs right now. SWCM will close referral and remain available as needed.

## 2025-05-23 ENCOUNTER — TELEPHONE (OUTPATIENT)
Age: 70
End: 2025-05-23

## 2025-05-23 ENCOUNTER — OFFICE VISIT (OUTPATIENT)
Dept: OBGYN CLINIC | Facility: MEDICAL CENTER | Age: 70
End: 2025-05-23

## 2025-05-23 ENCOUNTER — APPOINTMENT (OUTPATIENT)
Dept: RADIOLOGY | Facility: MEDICAL CENTER | Age: 70
End: 2025-05-23
Attending: ORTHOPAEDIC SURGERY
Payer: COMMERCIAL

## 2025-05-23 DIAGNOSIS — Z96.642 AFTERCARE FOLLOWING LEFT HIP JOINT REPLACEMENT SURGERY: Primary | ICD-10-CM

## 2025-05-23 DIAGNOSIS — Z96.642 S/P TOTAL LEFT HIP ARTHROPLASTY: ICD-10-CM

## 2025-05-23 DIAGNOSIS — Z47.1 AFTERCARE FOLLOWING LEFT HIP JOINT REPLACEMENT SURGERY: Primary | ICD-10-CM

## 2025-05-23 PROCEDURE — 73502 X-RAY EXAM HIP UNI 2-3 VIEWS: CPT

## 2025-05-23 RX ORDER — OXYCODONE HYDROCHLORIDE 5 MG/1
5 TABLET ORAL EVERY 6 HOURS PRN
Qty: 30 TABLET | Refills: 0 | Status: SHIPPED | OUTPATIENT
Start: 2025-05-23

## 2025-05-23 NOTE — TELEPHONE ENCOUNTER
MARIE Wiggins from residential home care  stated that she did not have a visit with patient today since patient had other appointments for herself and .

## 2025-05-23 NOTE — ASSESSMENT & PLAN NOTE
Continue physical therapy   Continue weight bearing activities as tolerated   Continue pain medications as needed   Continue lovenox as prescribed    Able to shower, letting warm soapy water run over incision and only pat dry   Follow up in 1 week for 2 week post-op appointment and removal of staples    Orders:  •  oxyCODONE (Roxicodone) 5 immediate release tablet; Take 1 tablet (5 mg total) by mouth every 6 (six) hours as needed for moderate pain Max Daily Amount: 20 mg

## 2025-05-23 NOTE — PROGRESS NOTES
Name: Sheryl Nichols      : 1955       MRN: [id number]   Encounter Provider: Chester Barron MD   Encounter Date: 25  Encounter department: [facility]     ASSESSMENT & PLAN:  Assessment & Plan  Aftercare following left hip joint replacement surgery  Continue physical therapy   Continue weight bearing activities as tolerated   Continue pain medications as needed   Continue lovenox as prescribed    Able to shower, letting warm soapy water run over incision and only pat dry   Follow up in 1 week for 2 week post-op appointment and removal of staples    Orders:  •  oxyCODONE (Roxicodone) 5 immediate release tablet; Take 1 tablet (5 mg total) by mouth every 6 (six) hours as needed for moderate pain Max Daily Amount: 20 mg    S/P total left hip arthroplasty    Orders:  •  XR hip/pelv 2-3 vws left if performed; Future         To do next visit:  Return in about 4 weeks (around 2025) for 6-week postop appointment.    _____________________________________________________  CHIEF COMPLAINT:  Chief Complaint   Patient presents with   • Left Hip - Post-op         SUBJECTIVE:  Sheryl Nichols is a 69 y.o. female who presents 11 days status post left total hip arthroplasty.  She is doing well.  She admits to continued pain of the left hip and has been managing it well with Robaxin and oxycodone.  Refill of oxycodone was sent to her pharmacy today.  Patient admits to working with home physical therapy 3 times a week and admits to making good progress.  Patient is ambulating well with a walker today.  She continues to take Lovenox daily.      PAST MEDICAL HISTORY:  Past Medical History[1]    PAST SURGICAL HISTORY:  Past Surgical History[2]    FAMILY HISTORY:  Family History[3]    SOCIAL HISTORY:  Social History[4]    MEDICATIONS:  Current Medications[5]    ALLERGIES:  Allergies[6]    LABS:  HgA1c:   Lab Results   Component Value Date    HGBA1C 5.8 (H)  "2025     BMP:   Lab Results   Component Value Date    CALCIUM 8.4 2025    K 4.4 2025    CO2 27 2025     2025    BUN 12 2025    CREATININE 0.61 2025     CBC: No components found for: \"CBC\"    _____________________________________________________  PHYSICAL EXAMINATION:  Vital signs: LMP  (LMP Unknown)   General: No acute distress, awake and alert  Psychiatric: Mood and affect appear appropriate  HEENT: Trachea Midline, No torticollis, no apparent facial trauma  Cardiovascular: No audible murmurs; Extremities appear perfused  Pulmonary: No audible wheezing or stridor  Skin: No open lesions; see further details (if any) below    MUSCULOSKELETAL EXAMINATION:  Ortho Exam  Left hip:  Posterior incision clean dry and intact  Staples well approximated; removed in office today  Incision cleaned, steri-strips applied  moderate ecchymosis present  No erythema present   Appropriate warmth and swelling  Good arc of motion with no pain  Patient sits comfortably in chair with hip flexed at 90 degrees  Patient stands from seated position without assistance  Calf compartments soft and supple  Sensation intact  Toes are warm sensate and mobile     ___________________________________________________  STUDIES REVIEWED:  I personally reviewed the images obtained in office today and my independent interpretation is as follows:    left hip xray:   Well aligned prosthesis without evidence of acute fractures, dislocations, acute changes, or hardware failure   Prosthesis appears properly sized and properly bonded to surrounding bone         PROCEDURES PERFORMED:    None preformed       Tara Lorenzo PA-C         [1]  Past Medical History:  Diagnosis Date   • Arthritis    • Primary osteoarthritis of left hip 2025   • Vocal cord nodules     removed   [2]  Past Surgical History:  Procedure Laterality Date   •  SECTION     • COLONOSCOPY     • LARYNGOSCOPY      with stripping of vocal " cords   • AL ARTHRP ACETBLR/PROX FEM PROSTC AGRFT/ALGRFT Left 2025    Procedure: Left total hip arthroplasty;  Surgeon: Chester Barron MD;  Location: [facility];  Service: Orthopedics   [3]  Family History  Problem Relation Name Age of Onset   • Lung cancer Mother Lianne Harris    • Breast cancer Mother Lianne Harris 45   • Diabetes type I Mother Lianne Harris    • Lung cancer Father Mother    • Cancer Father Mother    • Diabetes type I Father Mother    • Diabetes Daughter sherrie         mellitus   • Diabetes Son Holger Nichols         mellitus   • Mental illness Son Holger Nichols         Recently had a change in medication   • No Known Problems Sister marshall    • No Known Problems Maternal Grandmother     • No Known Problems Paternal Grandmother     • No Known Problems Daughter     • Stroke Son Ramon Nichols         Recently experienced another stroke   • Diabetes Son Ramon Nichols    [4]  Social History  Tobacco Use   • Smoking status: Former     Current packs/day: 0.00     Average packs/day: 0.5 packs/day for 30.0 years (15.0 ttl pk-yrs)     Types: Cigarettes     Start date: 1971     Quit date: 2001     Years since quittin.4   • Smokeless tobacco: Never   Vaping Use   • Vaping status: Never Used   Substance Use Topics   • Alcohol use: Yes     Alcohol/week: 2.0 standard drinks of alcohol     Types: 2 Glasses of wine per week     Comment: weekly   • Drug use: No   [5]    Current Outpatient Medications:   •  oxyCODONE (Roxicodone) 5 immediate release tablet, Take 1 tablet (5 mg total) by mouth every 6 (six) hours as needed for moderate pain Max Daily Amount: 20 mg, Disp: 30 tablet, Rfl: 0  •  acetaminophen (TYLENOL) 500 mg tablet, Take 2 tablets (1,000 mg total) by mouth every 6 (six) hours as needed for mild pain, Disp: 120 tablet, Rfl: 0  •  enoxaparin (LOVENOX) 40 mg/0.4 mL, Inject 0.4 mL (40 mg total) under the skin daily for 28 days To start Post-Op, Disp: 11.2 mL, Rfl:  0  •  methocarbamol (ROBAXIN) 500 mg tablet, Take 1 tablet (500 mg total) by mouth 3 (three) times a day as needed for muscle spasms, Disp: 60 tablet, Rfl: 0  •  Multiple Vitamin (Daily-Nicanor Multivitamin) TABS, Take 1 tablet by mouth in the morning, Disp: , Rfl: [6]  Allergies  Allergen Reactions   • Morphine Other (See Comments)     Stopped breathing on Morphine drip

## 2025-05-27 ENCOUNTER — TELEPHONE (OUTPATIENT)
Age: 70
End: 2025-05-27

## 2025-05-27 NOTE — TELEPHONE ENCOUNTER
Caller: Rosalie - [facility]     Doctor: Dr. Barron    Reason for call: Was wondering if you had any new orders for the patient - concerning her incision site and is the patient allowed to shower    Call back#: [phone]

## 2025-05-27 NOTE — TELEPHONE ENCOUNTER
Called and spoke with Rosalie at home health. I informed that the steri strips will remain in place, unless they fall off on their own. I did inform patient she is able to shower, trying to avoid getting the area saturated if possible and to ensure the area is clean and dry.  No further questions at this time.

## 2025-05-28 ENCOUNTER — TELEPHONE (OUTPATIENT)
Age: 70
End: 2025-05-28

## 2025-05-28 NOTE — TELEPHONE ENCOUNTER
Caller: Patient    Doctor: Dr. Barron    Reason for call: Patient calling to make a 6 week f/u for Left RENAY.  Putting appt around 6/20/25 but they were going to be out of town.  She is hoping to come in 6/13/25 to get confirmation it would be ok to travel in the car.       Call back#: [phone]

## 2025-05-29 NOTE — TELEPHONE ENCOUNTER
Hello,    Please advise if a forced appointment can be accommodated for the patient:    Call back #: [phone]    Insurance: [employer]     Reason for appointment: PO #2, left RENAY, sx 5/12/25    Requested doctor and/or location: Dr Barron/[facility] on 6/13      Thank you.   Skip tonight's dose of metoprolol and start carvedilol tomorrow morning

## 2025-06-13 ENCOUNTER — OFFICE VISIT (OUTPATIENT)
Dept: OBGYN CLINIC | Facility: MEDICAL CENTER | Age: 70
End: 2025-06-13

## 2025-06-13 VITALS — HEIGHT: 63 IN | BODY MASS INDEX: 27.29 KG/M2 | WEIGHT: 154 LBS

## 2025-06-13 DIAGNOSIS — Z47.1 AFTERCARE FOLLOWING LEFT HIP JOINT REPLACEMENT SURGERY: Primary | ICD-10-CM

## 2025-06-13 DIAGNOSIS — Z96.642 AFTERCARE FOLLOWING LEFT HIP JOINT REPLACEMENT SURGERY: Primary | ICD-10-CM

## 2025-06-13 PROCEDURE — 99024 POSTOP FOLLOW-UP VISIT: CPT | Performed by: ORTHOPAEDIC SURGERY

## 2025-06-13 NOTE — PROGRESS NOTES
"Name: Sheryl Nichols      : 1955       MRN: [id number]   Encounter Provider: Chester Barron MD   Encounter Date: 25  Encounter department: [facility] [place]     ASSESSMENT & PLAN:  Assessment & Plan  Aftercare following left hip joint replacement surgery  4 weeks following left total hip replacement, this patient has a well clinical exam.  Should continue to focus in therapy on restoring strength of the musculature, she can Luisito hip precautions at 6 weeks, and office follow-up in 8 weeks time with x-rays 2 views left hip on arrival would be appropriate for 3-month checkup            To do next visit:  No follow-ups on file.    _____________________________________________________  CHIEF COMPLAINT:  Chief Complaint   Patient presents with    Left Hip - Post-op         SUBJECTIVE:  Sheryl Nichols is a 69 y.o. female who presents 4 weeks following left total hip replacement.  She describes less pain and improved performance        PAST MEDICAL HISTORY:  Past Medical History[1]    PAST SURGICAL HISTORY:  Past Surgical History[2]    FAMILY HISTORY:  Family History[3]    SOCIAL HISTORY:  Social History[4]    MEDICATIONS:  Current Medications[5]    ALLERGIES:  Allergies[6]    LABS:  HgA1c:   Lab Results   Component Value Date    HGBA1C 5.8 (H) 2025     BMP:   Lab Results   Component Value Date    CALCIUM 8.4 2025    K 4.4 2025    CO2 27 2025     2025    BUN 12 2025    CREATININE 0.61 2025     CBC: No components found for: \"CBC\"    _____________________________________________________  PHYSICAL EXAMINATION:  Vital signs: Ht 5' 2.5\" (1.588 m)   Wt 69.9 kg (154 lb)   LMP  (LMP Unknown)   BMI 27.72 kg/m²   General: No acute distress, awake and alert  Psychiatric: Mood and affect appear appropriate  HEENT: Trachea Midline, No torticollis, no apparent facial trauma  Cardiovascular: No audible murmurs; " Extremities appear perfused  Pulmonary: No audible wheezing or stridor  Skin: No open lesions; see further details (if any) below    MUSCULOSKELETAL EXAMINATION:  Ortho Exam  Left hip is a healed posterolateral scar.  There is good left hip motion without groin or thigh pain.  There is no tenderness in left calf    ___________________________________________________  STUDIES REVIEWED:  I personally reviewed the images obtained in office today and my independent interpretation is as follows:    None performed      PROCEDURES PERFORMED:    Procedures    None preformed       Chseter Barron MD       [1]   Past Medical History:  Diagnosis Date    Arthritis     Primary osteoarthritis of left hip 2025    Vocal cord nodules     removed   [2]   Past Surgical History:  Procedure Laterality Date     SECTION      COLONOSCOPY      LARYNGOSCOPY      with stripping of vocal cords    NM ARTHRP ACETBLR/PROX FEM PROSTC AGRFT/ALGRFT Left 2025    Procedure: Left total hip arthroplasty;  Surgeon: Chester Barron MD;  Location: [facility];  Service: Orthopedics   [3]   Family History  Problem Relation Name Age of Onset    Lung cancer Mother Lianne Harris     Breast cancer Mother Lianne Ovalleso 45    Diabetes type I Mother Lianne Harris     Lung cancer Father Mother     Cancer Father Mother     Diabetes type I Father Mother     Diabetes Daughter sherrie         mellitus    Diabetes Son Holger Nichols         mellitus    Mental illness Son Holger Nichols         Recently had a change in medication    No Known Problems Sister marshall     No Known Problems Maternal Grandmother      No Known Problems Paternal Grandmother      No Known Problems Daughter      Stroke Son Ramon Nichols         Recently experienced another stroke    Diabetes Son Ramon Nichols    [4]   Social History  Tobacco Use    Smoking status: Former     Current packs/day: 0.00     Average packs/day: 0.5 packs/day for 30.0 years (15.0  ttl pk-yrs)     Types: Cigarettes     Start date: 1971     Quit date: 2001     Years since quittin.4    Smokeless tobacco: Never   Vaping Use    Vaping status: Never Used   Substance Use Topics    Alcohol use: Yes     Alcohol/week: 2.0 standard drinks of alcohol     Types: 2 Glasses of wine per week     Comment: weekly    Drug use: No   [5]   Current Outpatient Medications:     acetaminophen (TYLENOL) 500 mg tablet, Take 2 tablets (1,000 mg total) by mouth every 6 (six) hours as needed for mild pain, Disp: 120 tablet, Rfl: 0    methocarbamol (ROBAXIN) 500 mg tablet, Take 1 tablet (500 mg total) by mouth 3 (three) times a day as needed for muscle spasms, Disp: 60 tablet, Rfl: 0    Multiple Vitamin (Daily-Nicanor Multivitamin) TABS, Take 1 tablet by mouth in the morning, Disp: , Rfl:     oxyCODONE (Roxicodone) 5 immediate release tablet, Take 1 tablet (5 mg total) by mouth every 6 (six) hours as needed for moderate pain Max Daily Amount: 20 mg, Disp: 30 tablet, Rfl: 0    enoxaparin (LOVENOX) 40 mg/0.4 mL, Inject 0.4 mL (40 mg total) under the skin daily for 28 days To start Post-Op, Disp: 11.2 mL, Rfl: 0  [6]   Allergies  Allergen Reactions    Morphine Other (See Comments)     Stopped breathing on Morphine drip

## 2025-06-13 NOTE — ASSESSMENT & PLAN NOTE
4 weeks following left total hip replacement, this patient has a well clinical exam.  Should continue to focus in therapy on restoring strength of the musculature, she can Luisito hip precautions at 6 weeks, and office follow-up in 8 weeks time with x-rays 2 views left hip on arrival would be appropriate for 3-month checkup

## 2025-06-16 ENCOUNTER — TELEPHONE (OUTPATIENT)
Age: 70
End: 2025-06-16

## 2025-06-16 NOTE — TELEPHONE ENCOUNTER
Caller: home health     Doctor: Dr. Barron    Reason for call: Patient was told she is suppose to start out patient PT   Patient has three weeks left for once a week for PT at the home     Call back#: [phone]

## 2025-06-17 NOTE — TELEPHONE ENCOUNTER
Caller: Rosalie from [facility]     Doctor: Dr. Barron    Reason for call: Rosalie is requesting a PT Script for out patient  PT // Please advise and thank you     Call back : [phone]    Fax: [phone]

## 2025-06-18 DIAGNOSIS — Z47.1 AFTERCARE FOLLOWING LEFT HIP JOINT REPLACEMENT SURGERY: Primary | ICD-10-CM

## 2025-06-18 DIAGNOSIS — Z96.642 AFTERCARE FOLLOWING LEFT HIP JOINT REPLACEMENT SURGERY: Primary | ICD-10-CM

## 2025-06-23 NOTE — TELEPHONE ENCOUNTER
Antonia called to inform both ortho and pcp patient is scheduled to be discharged from current PT orders as of July 7th, after which patient is to start out patient PT and would like to go back to the [facility] PT near her home in [place]. Their fax number is [phone], orders should reflect start date of 7/21/25 per the request of patient as they will be returning from vacation prior to the 21st. Antonia is requesting a follow up call after orders have been placed, there are active orders in chart, please review and confirm these orders are for out patient PT with start date of 07/21, and for [place] location/services. Thank you

## 2025-07-08 NOTE — PROGRESS NOTES
PT Evaluation     Today's date: 2025  Patient name: Sheryl Nichols  : 1955  MRN: [id number]  Referring provider: Tara Lorenzo PA-C  Dx:   Encounter Diagnosis     ICD-10-CM    1. Aftercare following left hip joint replacement surgery  Z47.1 Ambulatory Referral to Physical Therapy    Z96.642             Start Time: 1100  Stop Time: 1145  Total time in clinic (min): 45 minutes    Assessment  Impairments: abnormal gait, abnormal or restricted ROM, activity intolerance, impaired physical strength, lacks appropriate home exercise program and pain with function  Functional limitations: stair negotiation, ambulation, ADLs  Symptom irritability: moderate    Assessment details: Pt is a 70 y/o female presenting to outpatient PT s/p left RENAY on 25. Upon examination, pt presents with slight limitations in left hip mobility, decreased LE strength, and mild tenderness throughout lateral hip. She is currently using cane in community to assist with ambulation. Functionally, these impairments have led to difficulty with stair negotiation, ambulation, and her ability to perform her usual ADLs and household tasks. Pt has good rehab potential to achieve stated goals and will benefit from skilled PT services to address above limitations in order to improve functional activity tolerance for ADLs and household tasks.    Understanding of Dx/Px/POC: good     Prognosis: good    Goals  STG - 4 weeks  1. Pt able to negotiate stairs reciprocally with minimal UE support.   2. Pt able to ambulate community distances with LRD and no gait deviations.    LTG - 8 weeks  1. L hip strength improved to at least 4+/5 to improve functional activity tolerance.  2. FOTO score will improve by at least 10 points to demonstrate improved functional abilities.     Plan  Patient would benefit from: PT eval and skilled physical therapy  Planned modality interventions: cryotherapy and unattended electrical stimulation    Planned therapy  "interventions: IASTM, joint mobilization, kinesiology taping, manual therapy, neuromuscular re-education, patient/caregiver education, strengthening, stretching, therapeutic activities, therapeutic exercise, home exercise program, graded motor, graded exercise and functional ROM exercises    Frequency: 2x week  Duration in weeks: 8  Plan of Care beginning date: 2025  Plan of Care expiration date: 9/3/2025  Treatment plan discussed with: patient        Subjective Evaluation    History of Present Illness  Date of surgery: 2025  Mechanism of injury: Pt arrives s/p left RENAY on  and just finished home health care. She arrives with quad cane and has been using it for stability. She states she did well with HH and has been able to navigate her home better. She states she has some difficulty with reciprocal stair negotiation. She states leg feels \"heavy\" and weak. She would like to be able to lift and move boxes as she is planning to move in the future.   Patient Goals  Patient goals for therapy: decreased pain, increased strength and independence with ADLs/IADLs    Pain  Current pain ratin  At best pain ratin  At worst pain ratin          Objective     Observations     Additional Observation Details  Ambulates with quad cane    Palpation     Additional Palpation Details  Mild tenderness along lateral hip near incision    Active Range of Motion   Left Hip   Flexion: 95 degrees     Right Hip   Flexion: 110 degrees     Strength/Myotome Testing     Left Hip   Planes of Motion   Flexion: 4+  Extension: 4  Abduction: 4    Right Hip   Planes of Motion   Flexion: 4+  Extension: 4+  Abduction: 4+    Left Knee   Normal strength    Right Knee   Normal strength             Precautions: L posterior RENAY 25    POC expires Unit limit Auth Expiration date PT/OT + Visit Limit?   9/3 N/A N/A No auth req BOMN                 Visit/Unit Tracking  AUTH Status:  Date               N/A Used 1               " "Remaining                    FOTO      Access Code:  [email or web address]     Manuals 7/9                                                                Neuro Re-Ed             Pt education on HEP, POC, expectations after surgery 10'            Glute set w/ bridge 2x10 3\"            Hooklying BKFO Grn 2x10 ea                                                                Ther Ex             SLR L 2x10                                                                                                       Ther Activity                                       Gait Training                                       Modalities                                            "

## 2025-07-09 ENCOUNTER — EVALUATION (OUTPATIENT)
Dept: PHYSICAL THERAPY | Facility: CLINIC | Age: 70
End: 2025-07-09
Payer: COMMERCIAL

## 2025-07-09 DIAGNOSIS — Z47.1 AFTERCARE FOLLOWING LEFT HIP JOINT REPLACEMENT SURGERY: Primary | ICD-10-CM

## 2025-07-09 DIAGNOSIS — Z96.642 AFTERCARE FOLLOWING LEFT HIP JOINT REPLACEMENT SURGERY: Primary | ICD-10-CM

## 2025-07-09 PROCEDURE — 97161 PT EVAL LOW COMPLEX 20 MIN: CPT

## 2025-07-09 PROCEDURE — 97110 THERAPEUTIC EXERCISES: CPT

## 2025-07-09 PROCEDURE — 97112 NEUROMUSCULAR REEDUCATION: CPT

## 2025-07-23 ENCOUNTER — OFFICE VISIT (OUTPATIENT)
Dept: PHYSICAL THERAPY | Facility: CLINIC | Age: 70
End: 2025-07-23
Payer: COMMERCIAL

## 2025-07-23 DIAGNOSIS — Z96.642 AFTERCARE FOLLOWING LEFT HIP JOINT REPLACEMENT SURGERY: Primary | ICD-10-CM

## 2025-07-23 DIAGNOSIS — Z47.1 AFTERCARE FOLLOWING LEFT HIP JOINT REPLACEMENT SURGERY: Primary | ICD-10-CM

## 2025-07-23 PROCEDURE — 97110 THERAPEUTIC EXERCISES: CPT

## 2025-07-23 PROCEDURE — 97530 THERAPEUTIC ACTIVITIES: CPT

## 2025-07-23 PROCEDURE — 97112 NEUROMUSCULAR REEDUCATION: CPT

## 2025-07-23 NOTE — PROGRESS NOTES
"Daily Note     Today's date: 2025  Patient name: Sheryl Nichols  : 1955  MRN: [id number]  Referring provider: Tara Lorenzo PA-C  Dx:   Encounter Diagnosis     ICD-10-CM    1. Aftercare following left hip joint replacement surgery  Z47.1     Z96.642           Start Time: 1100  Stop Time: 1142  Total time in clinic (min): 42 minutes    Subjective: Pt has returned from vacation and states she did a lot of walking while she was away.      Objective: See treatment diary below      Assessment: The patient was provided with cuing and demonstration with initiation of program to ensure proper form and technique with exercises. She was challenged by program but noted only muscle fatigue at end of session. Patient demonstrated fatigue post treatment, exhibited good technique with therapeutic exercises, and would benefit from continued PT      Plan: Progress treatment as tolerated.       Precautions: L posterior RENAY 25    POC expires Unit limit Auth Expiration date PT/OT + Visit Limit?   9/3 N/A N/A No auth req BOMN                 Visit/Unit Tracking  AUTH Status:  Date              N/A Used 1 2              Remaining                    FOTO      Access Code:  [email or web address]     Manuals                                                                Neuro Re-Ed             Pt education  10'            Glute set w/ bridge 2x10 3\" 2x10 3\"           Hooklying BKFO Grn 2x10 ea Grn 2x10 ea           NBOS EO/EC airex  1' ea           sidesteps  nv                                     Ther Ex             Rec bike for mobility  5'           SLR L 2x10 2x10           S/l hip abd L  2x10           SL leg press  35# 2x10                                                                            Ther Activity             STS  2x10           Step up  6\" 2x10           Lat step up  6\" 2x10           Gait Training                                       Modalities                                            "

## 2025-07-25 ENCOUNTER — OFFICE VISIT (OUTPATIENT)
Dept: PHYSICAL THERAPY | Facility: CLINIC | Age: 70
End: 2025-07-25
Payer: COMMERCIAL

## 2025-07-25 DIAGNOSIS — Z47.1 AFTERCARE FOLLOWING LEFT HIP JOINT REPLACEMENT SURGERY: Primary | ICD-10-CM

## 2025-07-25 DIAGNOSIS — Z96.642 AFTERCARE FOLLOWING LEFT HIP JOINT REPLACEMENT SURGERY: Primary | ICD-10-CM

## 2025-07-25 PROCEDURE — 97110 THERAPEUTIC EXERCISES: CPT

## 2025-07-25 PROCEDURE — 97112 NEUROMUSCULAR REEDUCATION: CPT

## 2025-07-25 PROCEDURE — 97530 THERAPEUTIC ACTIVITIES: CPT

## 2025-07-25 NOTE — PROGRESS NOTES
"Daily Note     Today's date: 2025  Patient name: Sheryl Nichols  : 1955  MRN: [id number]  Referring provider: Tara Lorenzo PA-C  Dx:   Encounter Diagnosis     ICD-10-CM    1. Aftercare following left hip joint replacement surgery  Z47.1     Z96.642           Start Time: 1100  Stop Time: 1145  Total time in clinic (min): 45 minutes    Subjective: Pt reports her hip felt good after last session, and she was going up the stairs easier.       Objective: See treatment diary below      Assessment: Added sidesteps to continue improving lateral hip strength and stability. TRX squats added to address difficulty with bending forward for ADLs and dressing with good form noted following verbal cues. Patient demonstrated fatigue post treatment, exhibited good technique with therapeutic exercises, and would benefit from continued PT      Plan: Progress treatment as tolerated.       Precautions: L posterior RENAY 25    POC expires Unit limit Auth Expiration date PT/OT + Visit Limit?   9/3 N/A N/A No auth req BOMN                 Visit/Unit Tracking  AUTH Status:  Date             N/A Used 1 2 3             Remaining                    FOTO      Access Code:  [email or web address]     Manuals                                                               Neuro Re-Ed             Pt education  10'            Glute set w/ bridge 2x10 3\" 2x10 3\" 2x10 3\"          Hooklying BKFO Grn 2x10 ea Grn 2x10 ea Blue 2x10 ea          NBOS EO/EC airex  1' ea 1' ea          Sidesteps @ bar  nv Grn 3 laps                                     Ther Ex             Rec bike for mobility  5' 5'          SLR L 2x10 2x10 2# 2x10          S/l hip abd L  2x10 2# 2x10          SL leg press  35# 2x10 35# 2x10          TRX squats   2x10                                                              Ther Activity             STS  2x10 2x10          Step up  6\" 2x10 6\" 2x10          Lat step up  6\" 2x10 6\" 2x10          Gait " Training                                       Modalities

## 2025-07-28 ENCOUNTER — OFFICE VISIT (OUTPATIENT)
Dept: PHYSICAL THERAPY | Facility: CLINIC | Age: 70
End: 2025-07-28
Payer: COMMERCIAL

## 2025-07-28 DIAGNOSIS — Z47.1 AFTERCARE FOLLOWING LEFT HIP JOINT REPLACEMENT SURGERY: Primary | ICD-10-CM

## 2025-07-28 DIAGNOSIS — Z96.642 AFTERCARE FOLLOWING LEFT HIP JOINT REPLACEMENT SURGERY: Primary | ICD-10-CM

## 2025-07-28 PROCEDURE — 97110 THERAPEUTIC EXERCISES: CPT

## 2025-07-28 PROCEDURE — 97112 NEUROMUSCULAR REEDUCATION: CPT

## 2025-07-30 ENCOUNTER — OFFICE VISIT (OUTPATIENT)
Dept: PHYSICAL THERAPY | Facility: CLINIC | Age: 70
End: 2025-07-30
Payer: COMMERCIAL

## 2025-07-30 DIAGNOSIS — Z96.642 AFTERCARE FOLLOWING LEFT HIP JOINT REPLACEMENT SURGERY: Primary | ICD-10-CM

## 2025-07-30 DIAGNOSIS — Z47.1 AFTERCARE FOLLOWING LEFT HIP JOINT REPLACEMENT SURGERY: Primary | ICD-10-CM

## 2025-07-30 PROCEDURE — 97110 THERAPEUTIC EXERCISES: CPT

## 2025-07-30 PROCEDURE — 97112 NEUROMUSCULAR REEDUCATION: CPT

## 2025-07-30 PROCEDURE — 97530 THERAPEUTIC ACTIVITIES: CPT

## 2025-08-08 ENCOUNTER — APPOINTMENT (OUTPATIENT)
Dept: RADIOLOGY | Facility: MEDICAL CENTER | Age: 70
End: 2025-08-08
Attending: ORTHOPAEDIC SURGERY
Payer: COMMERCIAL

## 2025-08-08 ENCOUNTER — OFFICE VISIT (OUTPATIENT)
Dept: OBGYN CLINIC | Facility: MEDICAL CENTER | Age: 70
End: 2025-08-08

## 2025-08-08 VITALS — HEIGHT: 62 IN | WEIGHT: 160 LBS | BODY MASS INDEX: 29.44 KG/M2

## 2025-08-08 DIAGNOSIS — Z96.642 S/P TOTAL LEFT HIP ARTHROPLASTY: Primary | ICD-10-CM

## 2025-08-08 PROCEDURE — 99024 POSTOP FOLLOW-UP VISIT: CPT | Performed by: ORTHOPAEDIC SURGERY

## 2025-08-13 ENCOUNTER — OFFICE VISIT (OUTPATIENT)
Dept: PHYSICAL THERAPY | Facility: CLINIC | Age: 70
End: 2025-08-13
Payer: COMMERCIAL

## 2025-08-15 ENCOUNTER — OFFICE VISIT (OUTPATIENT)
Dept: PHYSICAL THERAPY | Facility: CLINIC | Age: 70
End: 2025-08-15
Payer: COMMERCIAL

## 2025-08-18 ENCOUNTER — OFFICE VISIT (OUTPATIENT)
Dept: PHYSICAL THERAPY | Facility: CLINIC | Age: 70
End: 2025-08-18
Payer: COMMERCIAL

## 2025-08-18 DIAGNOSIS — Z47.1 AFTERCARE FOLLOWING LEFT HIP JOINT REPLACEMENT SURGERY: Primary | ICD-10-CM

## 2025-08-18 DIAGNOSIS — Z96.642 AFTERCARE FOLLOWING LEFT HIP JOINT REPLACEMENT SURGERY: Primary | ICD-10-CM

## 2025-08-18 PROCEDURE — 97112 NEUROMUSCULAR REEDUCATION: CPT

## 2025-08-18 PROCEDURE — 97110 THERAPEUTIC EXERCISES: CPT

## 2025-08-18 PROCEDURE — 97530 THERAPEUTIC ACTIVITIES: CPT

## 2025-08-21 ENCOUNTER — OFFICE VISIT (OUTPATIENT)
Dept: INTERNAL MEDICINE CLINIC | Facility: CLINIC | Age: 70
End: 2025-08-21
Payer: COMMERCIAL

## 2025-08-21 VITALS
WEIGHT: 160 LBS | HEIGHT: 62 IN | OXYGEN SATURATION: 97 % | BODY MASS INDEX: 29.44 KG/M2 | DIASTOLIC BLOOD PRESSURE: 82 MMHG | SYSTOLIC BLOOD PRESSURE: 136 MMHG | HEART RATE: 78 BPM

## 2025-08-21 DIAGNOSIS — Z12.31 ENCOUNTER FOR SCREENING MAMMOGRAM FOR BREAST CANCER: Primary | ICD-10-CM

## 2025-08-21 DIAGNOSIS — R73.03 PREDIABETES: ICD-10-CM

## 2025-08-21 DIAGNOSIS — E78.49 OTHER HYPERLIPIDEMIA: ICD-10-CM

## 2025-08-21 PROBLEM — Z01.810 PRE-OPERATIVE CARDIOVASCULAR EXAMINATION: Status: RESOLVED | Noted: 2018-06-04 | Resolved: 2025-08-21

## 2025-08-21 PROCEDURE — G2211 COMPLEX E/M VISIT ADD ON: HCPCS | Performed by: INTERNAL MEDICINE

## 2025-08-21 PROCEDURE — 99214 OFFICE O/P EST MOD 30 MIN: CPT | Performed by: INTERNAL MEDICINE

## 2025-08-22 ENCOUNTER — OFFICE VISIT (OUTPATIENT)
Dept: PHYSICAL THERAPY | Facility: CLINIC | Age: 70
End: 2025-08-22
Payer: COMMERCIAL

## 2025-08-22 DIAGNOSIS — Z96.642 AFTERCARE FOLLOWING LEFT HIP JOINT REPLACEMENT SURGERY: Primary | ICD-10-CM

## 2025-08-22 DIAGNOSIS — Z47.1 AFTERCARE FOLLOWING LEFT HIP JOINT REPLACEMENT SURGERY: Primary | ICD-10-CM

## 2025-08-22 PROCEDURE — 97110 THERAPEUTIC EXERCISES: CPT

## 2025-08-22 PROCEDURE — 97112 NEUROMUSCULAR REEDUCATION: CPT

## 2025-08-22 PROCEDURE — 97530 THERAPEUTIC ACTIVITIES: CPT

## (undated) DEVICE — MEDI-VAC TUBING CONNECTOR 6-IN-1 STRAIGHT: Brand: CARDINAL HEALTH

## (undated) DEVICE — SYRINGE 10ML LL

## (undated) DEVICE — GLOVE INDICATOR PI UNDERGLOVE SZ 7 BLUE

## (undated) DEVICE — DRAPE EQUIPMENT RF WAND

## (undated) DEVICE — GLOVE SRG BIOGEL 8

## (undated) DEVICE — GLOVE INDICATOR PI UNDERGLOVE SZ 8.5 BLUE

## (undated) DEVICE — GLOVE INDICATOR PI UNDERGLOVE SZ 7.5 BLUE

## (undated) DEVICE — GROUNDING PAD RFL

## (undated) DEVICE — SUT VICRYL PLUS 2-0 CTB-1 27 IN VCPB259H

## (undated) DEVICE — CAPIT HIP MOP -POLY CEMEMTED

## (undated) DEVICE — DRESSING MEPILEX AG BORDER POST-OP 4 X 12 IN

## (undated) DEVICE — SUT VICRYL PLUS 1 CTB-1 36 IN VCPB947H

## (undated) DEVICE — PROXIMATE PLUS MD MULTI-DIRECTIONAL RELEASE SKIN STAPLERS CONTAINS 35 STAINLESS STEEL STAPLES APPROXIMATE CLOSED DIMENSIONS: 6.9MM X 3.9MM WIDE: Brand: PROXIMATE

## (undated) DEVICE — STOCKINETTE: Brand: DEROYAL

## (undated) DEVICE — PENCIL ELECTROSURG E-Z CLEAN -0035H

## (undated) DEVICE — BETHLEHEM TOTAL HIP, KIT: Brand: CARDINAL HEALTH

## (undated) DEVICE — STIRRUP STRAP ADULT DISP

## (undated) DEVICE — BLADE INTREX LRG BONE OSCILLATING

## (undated) DEVICE — HOOD: Brand: T7PLUS

## (undated) DEVICE — GLOVE SRG BIOGEL 7.5

## (undated) DEVICE — LAPAROTOMY SPONGE - RF AND X-RAY DETECTABLE PRE-WASHED: Brand: SITUATE

## (undated) DEVICE — WET SKIN PREP TRAY: Brand: MEDLINE INDUSTRIES, INC.

## (undated) DEVICE — HANDPIECE SET WITH RETRACTABLE COAXIAL FAN SPRAY TIP AND SUCTION TUBE: Brand: INTERPULSE

## (undated) DEVICE — GLOVE SRG BIOGEL ORTHOPEDIC 8

## (undated) DEVICE — ASTOUND STANDARD SURGICAL GOWN, XL: Brand: CONVERTORS

## (undated) DEVICE — GLOVE INDICATOR PI UNDERGLOVE SZ 8 BLUE

## (undated) DEVICE — ABDUCTION PILLOW FOAM POSITIONER: Brand: CARDINAL HEALTH

## (undated) DEVICE — GLOVE SRG BIOGEL 6.5

## (undated) DEVICE — HOOD WITH PEEL AWAY FACE SHIELD: Brand: T7PLUS

## (undated) DEVICE — TRAY FOLEY 16FR URIMETER SURESTEP

## (undated) DEVICE — TUBING SUCTION 5MM X 12 FT